# Patient Record
Sex: FEMALE | Race: WHITE | NOT HISPANIC OR LATINO | Employment: FULL TIME | URBAN - METROPOLITAN AREA
[De-identification: names, ages, dates, MRNs, and addresses within clinical notes are randomized per-mention and may not be internally consistent; named-entity substitution may affect disease eponyms.]

---

## 2017-01-09 ENCOUNTER — OFFICE VISIT (OUTPATIENT)
Dept: OPHTHALMOLOGY | Facility: CLINIC | Age: 52
End: 2017-01-09
Payer: COMMERCIAL

## 2017-01-09 DIAGNOSIS — Z13.5 GLAUCOMA SCREENING: ICD-10-CM

## 2017-01-09 DIAGNOSIS — Z01.00 VISIT FOR EYE AND VISION EXAM: Primary | ICD-10-CM

## 2017-01-09 DIAGNOSIS — H52.7 REFRACTIVE ERROR: ICD-10-CM

## 2017-01-09 PROCEDURE — 99999 PR PBB SHADOW E&M-NEW PATIENT-LVL II: CPT | Mod: PBBFAC,,, | Performed by: OPTOMETRIST

## 2017-01-09 PROCEDURE — 92015 DETERMINE REFRACTIVE STATE: CPT | Mod: S$GLB,,, | Performed by: OPTOMETRIST

## 2017-01-09 PROCEDURE — 92004 COMPRE OPH EXAM NEW PT 1/>: CPT | Mod: S$GLB,,, | Performed by: OPTOMETRIST

## 2017-01-09 NOTE — MR AVS SNAPSHOT
[facility] - Ophthalmology  [address] [facility] [place] [address]  Phone: [phone]  Fax: [phone]                  Linette Schmidt   2017 1:00 PM   Office Visit    Description:  Female : 1965   Provider:  SHAKILA Tong, OD   Department:  [facility] - Ophthalmology           Reason for Visit     Eye Exam           Diagnoses this Visit        Comments    Visit for eye and vision exam    -  Primary     Glaucoma screening         Refractive error                To Do List           Future Appointments        Provider Department Dept Phone    2018 1:00 PM SHAKILA Tong, OD [facility] Ophthalmology [phone]      Goals (5 Years of Data)     None      Follow-Up and Disposition     Return in about 1 year (around 2018).      [facility]     [facility] -  Assistance  Registered nurses in the [facility] provide clinical advisement, health education, appointment booking, and other advisory services.  Call for this free service at [phone].             Medications           Message regarding Medications     Verify the changes and/or additions to your medication regime listed below are the same as discussed with your clinician today.  If any of these changes or additions are incorrect, please notify your healthcare provider.             Verify that the below list of medications is an accurate representation of the medications you are currently taking.  If none reported, the list may be blank. If incorrect, please contact your healthcare provider. Carry this list with you in case of emergency.                Clinical Reference Information           Allergies as of 2017     No Known Allergies      Immunizations Administered on Date of Encounter - 2017     None      [facility] Sign-Up     Activating your [facility] account is as easy as 1-2-3!     1) Visit [email or web address], select Sign Up Now, enter this activation code and your date of birth, then  select Next.  [email or web address]  Expires: 2/23/2017  2:13 PM      2) Create a username and password to use when you visit [facility] in the future and select a security question in case you lose your password and select Next.    3) Enter your e-mail address and click Sign Up!    Additional Information  If you have questions, please e-mail [email or web address] or call [phone] to talk to our [facility] staff. Remember, [facility] is NOT to be used for urgent needs. For medical emergencies, dial 911.

## 2017-01-09 NOTE — PROGRESS NOTES
HPI     Last [facility] exam 04/21/2008         Last edited by Stoney Weller MA on 1/9/2017  1:17 PM.         Assessment /Plan     For exam results, see Encounter Report.    Visit for eye and vision exam    Glaucoma screening    Refractive error      OH OK OU.  Spec Rx given.  RTC one year.

## 2018-01-16 ENCOUNTER — OFFICE VISIT (OUTPATIENT)
Dept: OPHTHALMOLOGY | Facility: CLINIC | Age: 53
End: 2018-01-16
Payer: COMMERCIAL

## 2018-01-16 DIAGNOSIS — Z01.00 VISIT FOR EYE AND VISION EXAM: Primary | ICD-10-CM

## 2018-01-16 DIAGNOSIS — Z13.5 GLAUCOMA SCREENING: ICD-10-CM

## 2018-01-16 DIAGNOSIS — H52.7 REFRACTIVE ERROR: ICD-10-CM

## 2018-01-16 PROCEDURE — 92015 DETERMINE REFRACTIVE STATE: CPT | Mod: S$GLB,,, | Performed by: OPTOMETRIST

## 2018-01-16 PROCEDURE — 92014 COMPRE OPH EXAM EST PT 1/>: CPT | Mod: S$GLB,,, | Performed by: OPTOMETRIST

## 2018-01-16 PROCEDURE — 99999 PR PBB SHADOW E&M-EST. PATIENT-LVL I: CPT | Mod: PBBFAC,,, | Performed by: OPTOMETRIST

## 2018-01-16 RX ORDER — ALBUTEROL SULFATE 90 UG/1
AEROSOL, METERED RESPIRATORY (INHALATION)
COMMUNITY
Start: 2018-01-08 | End: 2023-10-12 | Stop reason: SDUPTHER

## 2018-01-16 RX ORDER — AZITHROMYCIN 250 MG/1
TABLET, FILM COATED ORAL
COMMUNITY
Start: 2018-01-08 | End: 2018-08-24

## 2018-01-16 NOTE — PROGRESS NOTES
HPI     Last [facility] exam 01/09/2017  Screening for glaucoma  RE      Last edited by Stoney Weller MA on 1/16/2018 12:50 PM. (History)            Assessment /Plan     For exam results, see Encounter Report.    Visit for eye and vision exam    Glaucoma screening    Refractive error      OH OK OU.  Spec Rx given.  RTC one year.

## 2018-07-11 DIAGNOSIS — Z00.00 ROUTINE GENERAL MEDICAL EXAMINATION AT A HEALTH CARE FACILITY: Primary | ICD-10-CM

## 2018-08-24 ENCOUNTER — CLINICAL SUPPORT (OUTPATIENT)
Dept: CARDIOLOGY | Facility: CLINIC | Age: 53
End: 2018-08-24
Payer: COMMERCIAL

## 2018-08-24 ENCOUNTER — OFFICE VISIT (OUTPATIENT)
Dept: INTERNAL MEDICINE | Facility: CLINIC | Age: 53
End: 2018-08-24
Payer: COMMERCIAL

## 2018-08-24 ENCOUNTER — HOSPITAL ENCOUNTER (OUTPATIENT)
Dept: RADIOLOGY | Facility: HOSPITAL | Age: 53
Discharge: HOME | End: 2018-08-24
Attending: FAMILY MEDICINE
Payer: COMMERCIAL

## 2018-08-24 ENCOUNTER — CLINICAL SUPPORT (OUTPATIENT)
Dept: INTERNAL MEDICINE | Facility: CLINIC | Age: 53
End: 2018-08-24
Payer: COMMERCIAL

## 2018-08-24 ENCOUNTER — CLINICAL SUPPORT (OUTPATIENT)
Dept: INTERNAL MEDICINE | Facility: CLINIC | Age: 53
End: 2018-08-24

## 2018-08-24 ENCOUNTER — CLINICAL SUPPORT (OUTPATIENT)
Dept: REHABILITATION | Facility: HOSPITAL | Age: 53
End: 2018-08-24
Attending: FAMILY MEDICINE
Payer: COMMERCIAL

## 2018-08-24 VITALS
WEIGHT: 206 LBS | HEART RATE: 80 BPM | BODY MASS INDEX: 32.33 KG/M2 | HEIGHT: 67 IN | DIASTOLIC BLOOD PRESSURE: 82 MMHG | TEMPERATURE: 96 F | RESPIRATION RATE: 14 BRPM | SYSTOLIC BLOOD PRESSURE: 128 MMHG

## 2018-08-24 VITALS
SYSTOLIC BLOOD PRESSURE: 128 MMHG | HEIGHT: 67 IN | DIASTOLIC BLOOD PRESSURE: 82 MMHG | HEART RATE: 80 BPM | WEIGHT: 206.38 LBS | BODY MASS INDEX: 32.39 KG/M2 | RESPIRATION RATE: 14 BRPM

## 2018-08-24 DIAGNOSIS — Z00.00 ROUTINE GENERAL MEDICAL EXAMINATION AT A HEALTH CARE FACILITY: ICD-10-CM

## 2018-08-24 DIAGNOSIS — J45.20 MILD INTERMITTENT ASTHMA WITHOUT COMPLICATION: ICD-10-CM

## 2018-08-24 DIAGNOSIS — Z23 NEED FOR DIPHTHERIA-TETANUS-PERTUSSIS (TDAP) VACCINE: ICD-10-CM

## 2018-08-24 DIAGNOSIS — Z00.00 ROUTINE GENERAL MEDICAL EXAMINATION AT A HEALTH CARE FACILITY: Primary | ICD-10-CM

## 2018-08-24 DIAGNOSIS — Z12.11 COLON CANCER SCREENING: ICD-10-CM

## 2018-08-24 DIAGNOSIS — Z12.39 BREAST SCREENING: ICD-10-CM

## 2018-08-24 DIAGNOSIS — E66.9 OBESITY (BMI 30.0-34.9): ICD-10-CM

## 2018-08-24 PROBLEM — E66.811 OBESITY (BMI 30.0-34.9): Status: ACTIVE | Noted: 2018-08-24

## 2018-08-24 LAB
ALBUMIN SERPL BCP-MCNC: 4.2 G/DL
ALP SERPL-CCNC: 85 U/L
ALT SERPL W/O P-5'-P-CCNC: 36 U/L
ANION GAP SERPL CALC-SCNC: 12 MMOL/L
AST SERPL-CCNC: 32 U/L
BILIRUB SERPL-MCNC: 1.2 MG/DL
BUN SERPL-MCNC: 22 MG/DL
CALCIUM SERPL-MCNC: 9.3 MG/DL
CHLORIDE SERPL-SCNC: 107 MMOL/L
CHOLEST SERPL-MCNC: 134 MG/DL
CHOLEST/HDLC SERPL: 3.4 {RATIO}
CO2 SERPL-SCNC: 25 MMOL/L
CREAT SERPL-MCNC: 0.8 MG/DL
DIASTOLIC DYSFUNCTION: NO
ERYTHROCYTE [DISTWIDTH] IN BLOOD BY AUTOMATED COUNT: 13.2 %
EST. GFR  (AFRICAN AMERICAN): >60 ML/MIN/1.73 M^2
EST. GFR  (NON AFRICAN AMERICAN): >60 ML/MIN/1.73 M^2
ESTIMATED AVG GLUCOSE: 91 MG/DL
GLUCOSE SERPL-MCNC: 92 MG/DL
HBA1C MFR BLD HPLC: 4.8 %
HCT VFR BLD AUTO: 40.5 %
HDLC SERPL-MCNC: 40 MG/DL
HDLC SERPL: 29.9 %
HGB BLD-MCNC: 13.8 G/DL
HIV 1+2 AB+HIV1 P24 AG SERPL QL IA: NEGATIVE
LDLC SERPL CALC-MCNC: 76.8 MG/DL
MCH RBC QN AUTO: 28.9 PG
MCHC RBC AUTO-ENTMCNC: 34.1 G/DL
MCV RBC AUTO: 85 FL
NONHDLC SERPL-MCNC: 94 MG/DL
PLATELET # BLD AUTO: 187 K/UL
PMV BLD AUTO: 12.8 FL
POTASSIUM SERPL-SCNC: 3.9 MMOL/L
PROT SERPL-MCNC: 6.7 G/DL
RBC # BLD AUTO: 4.77 M/UL
SODIUM SERPL-SCNC: 144 MMOL/L
TRIGL SERPL-MCNC: 86 MG/DL
TSH SERPL DL<=0.005 MIU/L-ACNC: 1.93 UIU/ML
WBC # BLD AUTO: 4.78 K/UL

## 2018-08-24 PROCEDURE — 97802 MEDICAL NUTRITION INDIV IN: CPT | Mod: S$GLB,,, | Performed by: INTERNAL MEDICINE

## 2018-08-24 PROCEDURE — 99386 PREV VISIT NEW AGE 40-64: CPT | Mod: 25,S$GLB,, | Performed by: FAMILY MEDICINE

## 2018-08-24 PROCEDURE — 80053 COMPREHEN METABOLIC PANEL: CPT | Mod: PO

## 2018-08-24 PROCEDURE — 83036 HEMOGLOBIN GLYCOSYLATED A1C: CPT

## 2018-08-24 PROCEDURE — 84443 ASSAY THYROID STIM HORMONE: CPT

## 2018-08-24 PROCEDURE — 71046 X-RAY EXAM CHEST 2 VIEWS: CPT | Mod: TC,FY,PO

## 2018-08-24 PROCEDURE — 85027 COMPLETE CBC AUTOMATED: CPT | Mod: PO

## 2018-08-24 PROCEDURE — 71046 X-RAY EXAM CHEST 2 VIEWS: CPT | Mod: 26,,, | Performed by: RADIOLOGY

## 2018-08-24 PROCEDURE — 90471 IMMUNIZATION ADMIN: CPT | Mod: S$GLB,,, | Performed by: FAMILY MEDICINE

## 2018-08-24 PROCEDURE — 93015 CV STRESS TEST SUPVJ I&R: CPT | Mod: S$GLB,,, | Performed by: INTERNAL MEDICINE

## 2018-08-24 PROCEDURE — 97750 PHYSICAL PERFORMANCE TEST: CPT | Mod: PO | Performed by: PHYSICAL THERAPIST

## 2018-08-24 PROCEDURE — 90715 TDAP VACCINE 7 YRS/> IM: CPT | Mod: S$GLB,,, | Performed by: FAMILY MEDICINE

## 2018-08-24 PROCEDURE — 99999 PR PBB SHADOW E&M-EST. PATIENT-LVL III: CPT | Mod: PBBFAC,,, | Performed by: FAMILY MEDICINE

## 2018-08-24 PROCEDURE — 93000 ELECTROCARDIOGRAM COMPLETE: CPT | Mod: 59,S$GLB,, | Performed by: INTERNAL MEDICINE

## 2018-08-24 PROCEDURE — 80061 LIPID PANEL: CPT | Mod: PO

## 2018-08-24 PROCEDURE — 86703 HIV-1/HIV-2 1 RESULT ANTBDY: CPT

## 2018-08-24 RX ORDER — SODIUM, POTASSIUM,MAG SULFATES 17.5-3.13G
SOLUTION, RECONSTITUTED, ORAL ORAL
Qty: 1 BOTTLE | Refills: 0 | Status: SHIPPED | OUTPATIENT
Start: 2018-08-24 | End: 2021-07-22

## 2018-08-24 RX ORDER — METHOCARBAMOL 750 MG/1
TABLET, FILM COATED ORAL
Refills: 0 | COMMUNITY
Start: 2018-07-17 | End: 2018-08-24

## 2018-08-24 RX ORDER — DICLOFENAC SODIUM 50 MG/1
50 TABLET, DELAYED RELEASE ORAL EVERY 8 HOURS PRN
Refills: 0 | COMMUNITY
Start: 2018-07-17 | End: 2018-08-24

## 2018-08-24 NOTE — PROGRESS NOTES
"Subjective:       Patient ID: Linette Schmidt is a 53 y.o. female.    Chief Complaint: Executive Health    53-year-old female patient of Dr. King Cottrell here for EZBOB physical for [place] for the 1st time.  Patient with Patient Active Problem List:     Obesity (BMI 30.0-34.9)  Reports that she has been feeling better in denies any significant complaints today.  Takes albuterol inhaler as needed for history of asthma.  Patient reported that she has joined weight loss clinic 5 weeks ago and has lost 20 lb.  Stays physically active with walking at work but does not get any routine exercise  Denies any changes in bowel movements, appetite has been stable.       Review of Systems   Constitutional: Negative for fatigue.   Eyes: Negative for visual disturbance.   Respiratory: Negative for shortness of breath and wheezing.    Cardiovascular: Negative for chest pain and leg swelling.   Gastrointestinal: Negative for abdominal pain, nausea and vomiting.   Musculoskeletal: Negative for myalgias.   Skin: Negative for rash.   Neurological: Negative for light-headedness and headaches.   Psychiatric/Behavioral: Negative for sleep disturbance.         /82   Pulse 80   Temp 96.1 °F (35.6 °C) (Tympanic)   Resp 14   Ht 5' 7" (1.702 m)   Wt 93.4 kg (206 lb)   BMI 32.26 kg/m²   Objective:      Physical Exam   Constitutional: She is oriented to person, place, and time. She appears well-developed and well-nourished.   HENT:   Head: Normocephalic and atraumatic.   Mouth/Throat: Oropharynx is clear and moist.   Cardiovascular: Normal rate, regular rhythm and normal heart sounds.   No murmur heard.  Pulmonary/Chest: Effort normal and breath sounds normal. She has no wheezes.   Abdominal: Soft. Bowel sounds are normal. There is no tenderness.   Musculoskeletal: She exhibits no edema.   Neurological: She is alert and oriented to person, place, and time.   Skin: Skin is warm and dry. No rash noted.   Psychiatric: " She has a normal mood and affect.       Clinical Support on 08/24/2018   Component Date Value Ref Range Status    Sodium 08/24/2018 144  136 - 145 mmol/L Final    Potassium 08/24/2018 3.9  3.5 - 5.1 mmol/L Final    Chloride 08/24/2018 107  95 - 110 mmol/L Final    CO2 08/24/2018 25  23 - 29 mmol/L Final    Glucose 08/24/2018 92  70 - 110 mg/dL Final    BUN, Bld 08/24/2018 22* 6 - 20 mg/dL Final    Creatinine 08/24/2018 0.8  0.5 - 1.4 mg/dL Final    Calcium 08/24/2018 9.3  8.7 - 10.5 mg/dL Final    Total Protein 08/24/2018 6.7  6.0 - 8.4 g/dL Final    Albumin 08/24/2018 4.2  3.5 - 5.2 g/dL Final    Total Bilirubin 08/24/2018 1.2* 0.1 - 1.0 mg/dL Final    Comment: For infants and newborns, interpretation of results should be based  on gestational age, weight and in agreement with clinical  observations.  Premature Infant recommended reference ranges:  Up to 24 hours.............<8.0 mg/dL  Up to 48 hours............<12.0 mg/dL  3-5 days..................<15.0 mg/dL  6-29 days.................<15.0 mg/dL      Alkaline Phosphatase 08/24/2018 85  55 - 135 U/L Final    AST 08/24/2018 32  10 - 40 U/L Final    ALT 08/24/2018 36  10 - 44 U/L Final    Anion Gap 08/24/2018 12  8 - 16 mmol/L Final    eGFR if  08/24/2018 >60  >60 mL/min/1.73 m^2 Final    eGFR if non African American 08/24/2018 >60  >60 mL/min/1.73 m^2 Final    Comment: Calculation used to obtain the estimated glomerular filtration  rate (eGFR) is the CKD-EPI equation.       WBC 08/24/2018 4.78  3.90 - 12.70 K/uL Final    RBC 08/24/2018 4.77  4.00 - 5.40 M/uL Final    Hemoglobin 08/24/2018 13.8  12.0 - 16.0 g/dL Final    Hematocrit 08/24/2018 40.5  37.0 - 48.5 % Final    MCV 08/24/2018 85  82 - 98 fL Final    MCH 08/24/2018 28.9  27.0 - 31.0 pg Final    MCHC 08/24/2018 34.1  32.0 - 36.0 g/dL Final    RDW 08/24/2018 13.2  11.5 - 14.5 % Final    Platelets 08/24/2018 187  150 - 350 K/uL Final    MPV 08/24/2018 12.8  9.2 -  12.9 fL Final    Cholesterol 08/24/2018 134  120 - 199 mg/dL Final    Comment: The National Cholesterol Education Program (NCEP) has set the  following guidelines (reference ranges) for Cholesterol:  Optimal.....................<200 mg/dL  Borderline High.............200-239 mg/dL  High........................> or = 240 mg/dL      Triglycerides 08/24/2018 86  30 - 150 mg/dL Final    Comment: The National Cholesterol Education Program (NCEP) has set the  following guidelines (reference values) for triglycerides:  Normal......................<150 mg/dL  Borderline High.............150-199 mg/dL  High........................200-499 mg/dL      HDL 08/24/2018 40  40 - 75 mg/dL Final    Comment: The National Cholesterol Education Program (NCEP) has set the  following guidelines (reference values) for HDL Cholesterol:  Low...............<40 mg/dL  Optimal...........>60 mg/dL      LDL Cholesterol 08/24/2018 76.8  63.0 - 159.0 mg/dL Final    Comment: The National Cholesterol Education Program (NCEP) has set the  following guidelines (reference values) for LDL Cholesterol:  Optimal.......................<130 mg/dL  Borderline High...............130-159 mg/dL  High..........................160-189 mg/dL  Very High.....................>190 mg/dL      HDL/Chol Ratio 08/24/2018 29.9  20.0 - 50.0 % Final    Total Cholesterol/HDL Ratio 08/24/2018 3.4  2.0 - 5.0 Final    Non-HDL Cholesterol 08/24/2018 94  mg/dL Final    Comment: Risk category and Non-HDL cholesterol goals:  Coronary heart disease (CHD)or equivalent (10-year risk of CHD >20%):  Non-HDL cholesterol goal     <130 mg/dL  Two or more CHD risk factors and 10-year risk of CHD <= 20%:  Non-HDL cholesterol goal     <160 mg/dL  0 to 1 CHD risk factor:  Non-HDL cholesterol goal     <190 mg/dL         Assessment:       1. Routine general medical examination at a health care facility    2. Obesity (BMI 30.0-34.9)    3. Colon cancer screening    4. Breast screening    5.  Need for diphtheria-tetanus-pertussis (Tdap) vaccine    6. Mild intermittent asthma without complication        Plan:   Routine general medical examination at a health care facility  Obesity (BMI 30.0-34.9)  Vital signs stable today.  Clinical exam stable.  Encouraged to continue lifestyle modifications with diet and exercise to work on losing weight  Patient doing really well with weight loss clinic  Labs look stable including EKG showing normal sinus rhythm    Colon cancer screening  -     Case request GI: COLONOSCOPY  -     sodium,potassium,mag sulfates (SUPREP BOWEL PREP KIT) 17.5-3.13-1.6 gram SolR; Take it as directed  Dispense: 1 Bottle; Refill: 0    Patient due for colonoscopy and would like to get scheduled  Breast screening  -     Mammo Digital Screening Bilat with CAD; Future; Expected date: 08/24/2018  Patient due for mammogram  Patient status post partial hysterectomy    Need for diphtheria-tetanus-pertussis (Tdap) vaccine  -     (In Office Administered) Tdap Vaccine  Tetanus booster given today    Mild intermittent asthma without complication-stable on albuterol inhaler as needed

## 2018-08-24 NOTE — PROGRESS NOTES
Nutrition Assessment  This is a general nutrition consultation as per the contractual agreement of he client employer's insurance provider.    Client name:  Linette Schmidt  :  1965  Age:  53 y.o.  Gender:  female    FMH: Mother, Father and twin sister - HTN    Client states: she was informed today during her treadmill test that she has HTN and mentions has a strong family history of the same. One of her co-workers was on Medifast (Optavia), and since she (client) was unhappy, not feeling well and feet had been hurting, spontaneously decided that she would try the program. Currently she is in stage 1, consuming 800 - 1000 calories daily, 2000 mg sodium and all meals/snacks are OPTAVIA foods except dinner meal is prepared. 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 Medifast has improved her behaviors as she no longer drinks diet coke and previously had 3 cans daily, has since replaced with water. Now feels more well hydrated, and skin has improved. Her indulgence prior to Medifast was carbohydrates particularly large quantities of pasta as she is not a meat eater. Dinner meals prior to Medifast were frozen chicken patties or similar products pre cooked and frozen. Due to the need of homemade meal preparation she is watching less TV. Exercise is not permissible at this time, but recently purchased and wearing an Apple watch and looks forward to using it. Her long term wt. Loss goal is 160# and her plan is to transition to Wt. Watcher's program to learn how to choose and cook healthy foods. Today she is interested in healthy frozen foods that are reasonable in sodium so that less time is taken in meal prep and can enjoy more social events.    Past Medical History:   Diagnosis Date    Asthma        Social History    Marital status:    Employment:   works for [employer] , she works REG  "-[employer]  Social History     Tobacco Use    Smoking status: Never Smoker    Smokeless tobacco: Never Used   Substance Use Topics    Alcohol use: Yes     Comment: socially        Current medications:  has a current medication list which includes the following prescription(s): proair hfa and sodium,potassium,mag sulfates.  Vitamins, minerals, and/or supplements:  Calcium/Vitamin D, Vitamin C, B-complex   Food allergies or intolerances:  none     Food History  Breakfast:  Optiva mashed potatoes  Mid-morning Snack:  bar  Lunch:  Chicken noodle soup  Mid-afternoon Snack:  Optiva cookie or brownie  Snack: cereal or shake  Dinner:  5 oz. Chicken 3 1/2 c. non starchy veggies    Exercise History:  None, Restricted while in this phase of [place]     Lab Reports (unavailable at time of visit)  Total Cholesterol:  134    Triglycerides:  86  HDL:  40  LDL:  76.8   Glucose:  92  HbA1c:  pending  BP:  128/82     Weight History  Height:  5'7"     Weight:  206  BMI:  32.26  % Body Fat:  Declined fitness evaluation    Diagnosis  RMR (Method:  [address] [employer]):  1728   Activity Factor:  1.3  NICOLAS:  1915 - 500 = 1415    Obesity related to improper food choices and imbalanced meals as evidenced by BMI: 32.26.    Intervention    Goals:  1.  Continue with [place] til wt goal of 160#  2.  Transition to Wt. Watcher membership    3.  Aerobic exercise 3x/wk - 2.5+ hrs.week  4. 2000 - 2500 mg sodium daily goal    Laboratory values unavailable at time of visit. Complimented client on success of wt loss. Discussed guidelines for low and high sodium foods per serving and how to read label.Explained meal prep best done with fresh foods as nature provides, or frozen vegetables without salt or frozen meals such as Healthy choice simply bowls. Provided Healthy shopping list and which items suggested for fast meals. Explained the advantages of bean pasta and gave brand name and client very interested in a trial. Reviewed the resources of Fast " Food and Lite Restaurant dining guides and Eat Fit mobile dining [place].Client very appreciated of this info and stated it would be very useful. Understanding appears good and appears in action phase of change.     Handouts provided:  Meal Planning Guide  Restaurant Guide  Eat Fit Shopping List  Eat Fit Genesis  Fast Food Guide  Vitamin/Mineral Guide    Monitoring/Evaluation    Monitor the following:  Weight  BMI  % Body Fat  Caloric intake  Labs:  CMP/Lipids    Follow Up Plan:  Follow up with client in 1-2 years

## 2018-08-24 NOTE — PROGRESS NOTES
:  [birth year]    DX: v70.0  Orders:  Fitness Evaluation    An Executive Health Fitness Component evaluation was completed.  Results are as follows:    Height (in):    67                            Weight (lbs):    206                                    BMI:   32.3    Resting Energy Expenditure:         1590       kcal/24 hrs.              Estimated:    1587     REE measured post treadmill:         No   REE measured fasting:       Yes           Body Composition:          36.45  % body fat             Rating: Fair    Waist to Hip Ratio:     0.84                    Risk:  Moderate   Hip taken over clothing:      Yes          Muscular Strength and Endurance Assessment:               Strength (lbs):     Right: 61             Rating:  Average      Left:  59           Rating: Average   Push-ups:   14                 Rating:  Very good   Curl-ups :   48                Rating:  Well above average    Flexibility Testing:   Sit and Reach (cm):    25                       Rating:  Fair    The patient tolerated the testing procedures well.

## 2018-08-31 ENCOUNTER — DOCUMENTATION ONLY (OUTPATIENT)
Dept: ENDOSCOPY | Facility: HOSPITAL | Age: 53
End: 2018-08-31

## 2018-09-04 ENCOUNTER — HOSPITAL ENCOUNTER (OUTPATIENT)
Dept: RADIOLOGY | Facility: HOSPITAL | Age: 53
Discharge: HOME | End: 2018-09-04
Attending: FAMILY MEDICINE
Payer: COMMERCIAL

## 2018-09-04 VITALS — BODY MASS INDEX: 32.33 KG/M2 | WEIGHT: 206 LBS | HEIGHT: 67 IN

## 2018-09-04 DIAGNOSIS — Z12.39 BREAST SCREENING: ICD-10-CM

## 2018-09-04 PROCEDURE — 77067 SCR MAMMO BI INCL CAD: CPT | Mod: 26,,, | Performed by: RADIOLOGY

## 2018-09-04 PROCEDURE — 77067 SCR MAMMO BI INCL CAD: CPT | Mod: TC,PO

## 2018-09-04 PROCEDURE — 77063 BREAST TOMOSYNTHESIS BI: CPT | Mod: 26,,, | Performed by: RADIOLOGY

## 2019-05-31 DIAGNOSIS — Z00.00 ROUTINE GENERAL MEDICAL EXAMINATION AT A HEALTH CARE FACILITY: Primary | ICD-10-CM

## 2019-05-31 DIAGNOSIS — Z91.89 AT RISK FOR CORONARY ARTERY DISEASE: ICD-10-CM

## 2019-06-17 ENCOUNTER — APPOINTMENT (OUTPATIENT)
Dept: RADIOLOGY | Facility: HOSPITAL | Age: 54
End: 2019-06-17
Payer: COMMERCIAL

## 2019-06-17 ENCOUNTER — HOSPITAL ENCOUNTER (OUTPATIENT)
Dept: RADIOLOGY | Facility: HOSPITAL | Age: 54
Discharge: HOME | End: 2019-06-17
Attending: FAMILY MEDICINE
Payer: COMMERCIAL

## 2019-06-17 ENCOUNTER — CLINICAL SUPPORT (OUTPATIENT)
Dept: INTERNAL MEDICINE | Facility: CLINIC | Age: 54
End: 2019-06-17
Payer: COMMERCIAL

## 2019-06-17 ENCOUNTER — OFFICE VISIT (OUTPATIENT)
Dept: INTERNAL MEDICINE | Facility: CLINIC | Age: 54
End: 2019-06-17
Payer: COMMERCIAL

## 2019-06-17 ENCOUNTER — CLINICAL SUPPORT (OUTPATIENT)
Dept: REHABILITATION | Facility: HOSPITAL | Age: 54
End: 2019-06-17
Payer: COMMERCIAL

## 2019-06-17 VITALS
HEIGHT: 66 IN | BODY MASS INDEX: 23.74 KG/M2 | RESPIRATION RATE: 16 BRPM | DIASTOLIC BLOOD PRESSURE: 82 MMHG | HEART RATE: 67 BPM | WEIGHT: 147.69 LBS | SYSTOLIC BLOOD PRESSURE: 150 MMHG

## 2019-06-17 VITALS
HEIGHT: 66 IN | BODY MASS INDEX: 23.74 KG/M2 | HEART RATE: 67 BPM | OXYGEN SATURATION: 99 % | DIASTOLIC BLOOD PRESSURE: 82 MMHG | TEMPERATURE: 97 F | SYSTOLIC BLOOD PRESSURE: 150 MMHG | RESPIRATION RATE: 16 BRPM | WEIGHT: 147.69 LBS

## 2019-06-17 DIAGNOSIS — E78.2 MIXED HYPERLIPIDEMIA: ICD-10-CM

## 2019-06-17 DIAGNOSIS — Z00.00 ROUTINE GENERAL MEDICAL EXAMINATION AT A HEALTH CARE FACILITY: Primary | ICD-10-CM

## 2019-06-17 DIAGNOSIS — J45.20 MILD INTERMITTENT ASTHMA WITHOUT COMPLICATION: ICD-10-CM

## 2019-06-17 DIAGNOSIS — Z00.00 ROUTINE GENERAL MEDICAL EXAMINATION AT A HEALTH CARE FACILITY: ICD-10-CM

## 2019-06-17 DIAGNOSIS — Z71.3 DIETARY COUNSELING: ICD-10-CM

## 2019-06-17 DIAGNOSIS — Z91.89 AT RISK FOR CORONARY ARTERY DISEASE: ICD-10-CM

## 2019-06-17 DIAGNOSIS — M81.0 OSTEOPOROSIS, UNSPECIFIED OSTEOPOROSIS TYPE, UNSPECIFIED PATHOLOGICAL FRACTURE PRESENCE: ICD-10-CM

## 2019-06-17 DIAGNOSIS — I10 ESSENTIAL HYPERTENSION: ICD-10-CM

## 2019-06-17 PROBLEM — E66.9 OBESITY (BMI 30.0-34.9): Status: RESOLVED | Noted: 2018-08-24 | Resolved: 2019-06-17

## 2019-06-17 PROBLEM — E66.811 OBESITY (BMI 30.0-34.9): Status: RESOLVED | Noted: 2018-08-24 | Resolved: 2019-06-17

## 2019-06-17 LAB
ALBUMIN SERPL BCP-MCNC: 4 G/DL (ref 3.5–5.2)
ALP SERPL-CCNC: 83 U/L (ref 55–135)
ALT SERPL W/O P-5'-P-CCNC: 24 U/L (ref 10–44)
ANION GAP SERPL CALC-SCNC: 11 MMOL/L (ref 8–16)
AST SERPL-CCNC: 24 U/L (ref 10–40)
BILIRUB SERPL-MCNC: 1 MG/DL (ref 0.1–1)
BUN SERPL-MCNC: 28 MG/DL (ref 6–20)
CALCIUM SERPL-MCNC: 9.5 MG/DL (ref 8.7–10.5)
CHLORIDE SERPL-SCNC: 106 MMOL/L (ref 95–110)
CHOLEST SERPL-MCNC: 133 MG/DL (ref 120–199)
CHOLEST/HDLC SERPL: 2.3 {RATIO} (ref 2–5)
CO2 SERPL-SCNC: 27 MMOL/L (ref 23–29)
CREAT SERPL-MCNC: 0.8 MG/DL (ref 0.5–1.4)
ERYTHROCYTE [DISTWIDTH] IN BLOOD BY AUTOMATED COUNT: 12.1 % (ref 11.5–14.5)
EST. GFR  (AFRICAN AMERICAN): >60 ML/MIN/1.73 M^2
EST. GFR  (NON AFRICAN AMERICAN): >60 ML/MIN/1.73 M^2
ESTIMATED AVG GLUCOSE: 91 MG/DL (ref 68–131)
GLUCOSE SERPL-MCNC: 87 MG/DL (ref 70–110)
HBA1C MFR BLD HPLC: 4.8 % (ref 4–5.6)
HCT VFR BLD AUTO: 38.2 % (ref 37–48.5)
HDLC SERPL-MCNC: 58 MG/DL (ref 40–75)
HDLC SERPL: 43.6 % (ref 20–50)
HGB BLD-MCNC: 13.4 G/DL (ref 12–16)
LDLC SERPL CALC-MCNC: 61.6 MG/DL (ref 63–159)
MCH RBC QN AUTO: 30.7 PG (ref 27–31)
MCHC RBC AUTO-ENTMCNC: 35.1 G/DL (ref 32–36)
MCV RBC AUTO: 88 FL (ref 82–98)
NONHDLC SERPL-MCNC: 75 MG/DL
PLATELET # BLD AUTO: 171 K/UL (ref 150–350)
PMV BLD AUTO: 12 FL (ref 9.2–12.9)
POTASSIUM SERPL-SCNC: 4.6 MMOL/L (ref 3.5–5.1)
PROT SERPL-MCNC: 6.8 G/DL (ref 6–8.4)
RBC # BLD AUTO: 4.36 M/UL (ref 4–5.4)
SODIUM SERPL-SCNC: 144 MMOL/L (ref 136–145)
TRIGL SERPL-MCNC: 67 MG/DL (ref 30–150)
TSH SERPL DL<=0.005 MIU/L-ACNC: 2.25 UIU/ML (ref 0.4–4)
WBC # BLD AUTO: 5.69 K/UL (ref 3.9–12.7)

## 2019-06-17 PROCEDURE — 75571 CT HRT W/O DYE W/CA TEST: CPT | Mod: 26,,, | Performed by: RADIOLOGY

## 2019-06-17 PROCEDURE — 3077F SYST BP >= 140 MM HG: CPT | Mod: CPTII,S$GLB,, | Performed by: FAMILY MEDICINE

## 2019-06-17 PROCEDURE — 75571 CT HRT W/O DYE W/CA TEST: CPT | Mod: TC

## 2019-06-17 PROCEDURE — 99999 PR PBB SHADOW E&M-EST. PATIENT-LVL III: CPT | Mod: PBBFAC,,, | Performed by: FAMILY MEDICINE

## 2019-06-17 PROCEDURE — 97750 PHYSICAL PERFORMANCE TEST: CPT | Performed by: PHYSICAL THERAPIST

## 2019-06-17 PROCEDURE — 97802 MEDICAL NUTRITION INDIV IN: CPT | Mod: S$GLB,,, | Performed by: INTERNAL MEDICINE

## 2019-06-17 PROCEDURE — 3079F PR MOST RECENT DIASTOLIC BLOOD PRESSURE 80-89 MM HG: ICD-10-PCS | Mod: CPTII,S$GLB,, | Performed by: FAMILY MEDICINE

## 2019-06-17 PROCEDURE — 83036 HEMOGLOBIN GLYCOSYLATED A1C: CPT

## 2019-06-17 PROCEDURE — 77080 DEXA BONE DENSITY SPINE HIP: ICD-10-PCS | Mod: 26,,, | Performed by: RADIOLOGY

## 2019-06-17 PROCEDURE — 99999 PR PBB SHADOW E&M-EST. PATIENT-LVL III: ICD-10-PCS | Mod: PBBFAC,,, | Performed by: FAMILY MEDICINE

## 2019-06-17 PROCEDURE — 3079F DIAST BP 80-89 MM HG: CPT | Mod: CPTII,S$GLB,, | Performed by: FAMILY MEDICINE

## 2019-06-17 PROCEDURE — 84443 ASSAY THYROID STIM HORMONE: CPT

## 2019-06-17 PROCEDURE — 3077F PR MOST RECENT SYSTOLIC BLOOD PRESSURE >= 140 MM HG: ICD-10-PCS | Mod: CPTII,S$GLB,, | Performed by: FAMILY MEDICINE

## 2019-06-17 PROCEDURE — 75571 CT CALCIUM SCORING CARDIAC: ICD-10-PCS | Mod: 26,,, | Performed by: RADIOLOGY

## 2019-06-17 PROCEDURE — 97802 PR MED NUTR THER, 1ST, INDIV, EA 15 MIN: ICD-10-PCS | Mod: S$GLB,,, | Performed by: INTERNAL MEDICINE

## 2019-06-17 PROCEDURE — 77080 DXA BONE DENSITY AXIAL: CPT | Mod: TC

## 2019-06-17 PROCEDURE — 77080 DXA BONE DENSITY AXIAL: CPT | Mod: 26,,, | Performed by: RADIOLOGY

## 2019-06-17 PROCEDURE — 80061 LIPID PANEL: CPT

## 2019-06-17 PROCEDURE — 80053 COMPREHEN METABOLIC PANEL: CPT

## 2019-06-17 PROCEDURE — 85027 COMPLETE CBC AUTOMATED: CPT

## 2019-06-17 PROCEDURE — 99386 PR PREVENTIVE VISIT,NEW,40-64: ICD-10-PCS | Mod: S$GLB,,, | Performed by: FAMILY MEDICINE

## 2019-06-17 PROCEDURE — 99386 PREV VISIT NEW AGE 40-64: CPT | Mod: S$GLB,,, | Performed by: FAMILY MEDICINE

## 2019-06-17 RX ORDER — PRAVASTATIN SODIUM 40 MG/1
TABLET ORAL
Refills: 0 | COMMUNITY
Start: 2019-05-23 | End: 2020-06-22

## 2019-06-17 RX ORDER — RAMIPRIL 10 MG/1
CAPSULE ORAL
Refills: 0 | COMMUNITY
Start: 2019-05-23

## 2019-06-17 RX ORDER — ASPIRIN 81 MG/1
81 TABLET ORAL DAILY
COMMUNITY

## 2019-06-17 NOTE — PROGRESS NOTES
"Nutrition Assessment  Client name:  Linette Schmidt  :  1965  Age:  54 y.o.  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 Patient-reported history of hypertension and dyslipidemia, both for which she takes medications. She reports a 85 lb weight loss in 7 months on the Optavia program. She has been maintaining the loss for approximately 4 months and is interested in transitioning away from the Optavia products for less expensive and whole food options. Current exercise includes walking and taking the stairs when possible.     Anthropometrics  Height:  5'6"     Weight:  147 lb  BMI:  23.8  % Body Fat:  27.13%    Clinical Signs/Symptoms  N/V/D:  None  Appetite (Good, Fair, or Poor):  Good      Past Medical History:   Diagnosis Date    Asthma        Past Surgical History:   Procedure Laterality Date    FINGER FRACTURE SURGERY Left     5th finger    PARTIAL HYSTERECTOMY         Medications    has a current medication list which includes the following prescription(s): aspirin, pravastatin, proair hfa, ramipril, and sodium,potassium,mag sulfates.    Vitamins, Minerals, and/or Supplements:  MVI, Vit C, B-complex, calcium plus vit D     Food/Medication Interactions:  Reviewed     Food Allergies or Intolerances:  NKFA    Social History    Marital status:    Employment:      Social History     Tobacco Use    Smoking status: Never Smoker    Smokeless tobacco: Never Used   Substance Use Topics    Alcohol use: Yes     Comment: socially        Lab Reports   Total Cholesterol:  133  Triglycerides:  67  HDL:  58  LDL:  61.6   Glucose:  87  HbA1c:  pending  BP:  150/82     Food History  Breakfast:  Egg white and Optavia bar  Mid-morning Snack:  Fruit or [place] granola bar  Lunch:  Caulifower, low calorie dressing, Optavia soup  Mid-afternoon Snack:  Fruit or granola bar  Dinner:  Chicken with vegetables or salad  H.S. Snack:  " Optavia bar  *Fluid intake:  water    Exercise History:  Walking and taking the stairs when possible    Cultural/Spiritual/Personal Preferences:  None Identified    Support System:  Spouse    State of Change:  Preparation    Barriers to Change:  Time    Diagnosis    Food and nutrition-related knowledge defecit related to limited nutrition education as evidenced by patient's inquiries regarding energy balance, meal/snack planning and sodium.    Intervention    RMR (Method:  [employer]):  1298 kcal  Activity Factor:  1.3  NICOLAS:  1687    Goals:  1.  Maintain weight loss  2.  Transition from Optavia snacks to less expensive protein bars and/or whole food options  3.  Limit sodium by reading food labels and limiting dining out.    Nutrition Education  Lab results were not available at time of consult, therefore were not discussed. Complimented patient on her weight loss and her ability to maintain the loss thus far. Discussed energy balance in weight management and strategies to transition away from optavia products to less expensive, whole-food options to add variety and quality to the diet. Discussed portion control and meal planning using The Plate Method template as well as building a balance snack to improve satiety. Reviewed and provided Fast Food and Restaurant guides to assist with making healthier options when dining out. Discussed reading food labels and assessing sodium content in packaged foods as well as general diet guidelines on limiting sodium intake. Reviewed benefits of exercise and Physical Activity guidelines encouraging a slow increase to 150 minutes of exercise weekly. Encouraged small, incremental habit change to improve adherence and sustainability over time.      Patient verbalized understanding of nutrition education and recommendations received.    Handouts Provided  Meal Planning Guide  Restaurant Guide  Eat Fit Shopping List  Eat Fit Genesis  Fast Food Guide  The Plate Method  Satisfying  Salads    Monitoring/Evaluation    Monitor the following:  Weight  BMI  % Body Fat  Caloric intake  Labs:  CMP, Lipid panel, Hgb A1c    Follow Up Plan:  Communication with referring healthcare provider is unnecessary at this time as patient presented as part of annual wellness exam.  However, will follow up with patient in 1-2 years.

## 2019-06-17 NOTE — PROGRESS NOTES
"Subjective:       Patient ID: Linette Schmidt is a 54 y.o. female.    Chief Complaint: Executive Health    54-year-old female patient with Patient Active Problem List:     Mild intermittent asthma without complication     Mixed hyperlipidemia     Osteoporosis     Essential hypertension  Here for executive health physical for [place].  Patient reports that she has been taking her medications regularly, and has been placed on ramipril 10 mg for her heart.   Patient denies any headache or vision disturbances, chest pain shortness of breath abdominal discomfort nausea vomiting.  Never had DEXA scan in the past.  Asthma has been stable.  Has lost 85 lb in the past 1 year and reports that she has been having hair loss.   Will start exercise soon, has lost weight with weight loss program    Review of Systems   Constitutional: Negative for fatigue.   Eyes: Negative for visual disturbance.   Respiratory: Negative for shortness of breath.    Cardiovascular: Negative for chest pain and leg swelling.   Gastrointestinal: Negative for abdominal pain, nausea and vomiting.   Musculoskeletal: Negative for myalgias.   Skin: Negative for rash.   Neurological: Negative for light-headedness and headaches.   Psychiatric/Behavioral: Negative for sleep disturbance.         BP (!) 150/82 (BP Location: Left arm, Patient Position: Sitting, BP Method: Medium (Manual))   Pulse 67   Temp 97 °F (36.1 °C) (Tympanic)   Resp 16   Ht 5' 6" (1.676 m)   Wt 67 kg (147 lb 11.3 oz)   SpO2 99%   BMI 23.84 kg/m²   Objective:      Physical Exam   Constitutional: She is oriented to person, place, and time. She appears well-developed and well-nourished.   HENT:   Head: Normocephalic and atraumatic.   Mouth/Throat: Oropharynx is clear and moist.   Cardiovascular: Normal rate, regular rhythm and normal heart sounds.   No murmur heard.  Pulmonary/Chest: Effort normal and breath sounds normal. She has no wheezes.   Abdominal: Soft. Bowel sounds are " normal. There is no tenderness.   Musculoskeletal: She exhibits no edema.   Neurological: She is alert and oriented to person, place, and time.   Skin: Skin is warm and dry. No rash noted.   Psychiatric: She has a normal mood and affect.         Assessment/Plan:   1. Routine general medical examination at a health care facility  Vital signs stable today.  Clinical exam stable.  Up-to-date with screenings and vaccinations  Continue lifestyle changes recommended with diet and exercise    2. Mild intermittent asthma without complication  Stable on albuterol inhaler as needed    3. Mixed hyperlipidemia  Stable on pravastatin 40 mg daily    4. Osteoporosis, unspecified osteoporosis type, unspecified pathological fracture presence  Patient was advised to start taking calcium with vitamin-D supplements over-the-counter and establish care with Rheumatology to discuss further treatment options are discuss further with PCP for DEXA scan showing osteoporosis    5. Essential hypertension  Blood pressure mildly elevated currently taking ramipril 10 mg daily, advised to discuss further with PCP  Encouraged to monitor blood pressure trends  Patient not interested in hypertension digital program at this time

## 2019-06-17 NOTE — PROGRESS NOTES
:  69    DX: Z00.0  Orders:  Fitness Evaluation    Patient's [facility] Fitness Component evaluation was completed.  Results are as follows:    Height (in):    66                            Weight (lbs):    147                                    BMI:   23.8    Resting Energy Expenditure:         1440       kcal/24 hrs.              Estimated:    1298     REE measured post treadmill:         No   REE measured fasting:       Yes          Body Composition:          27.13  % body fat             Rating: very good    Waist to Hip Ratio:     0.80                    Risk:  low   Hip taken over clothing:      Yes          Muscular Strength and Endurance Assessment:               Strength (lbs):     Right: 64             Rating:  average      Left:  55.7           Rating: average   Push-ups:   13                 Rating:  Very good   Curl-ups :   48                Rating:  Well above average    Flexibility Testing:   Sit and Reach (cm):    30                       Rating:  Very good    Patient has lost ~ 60# since last visit with primarily dietary changes. Feeling much better.  Has also started exercising.    The patient completed the testing procedures without complications.

## 2020-05-21 DIAGNOSIS — Z00.00 ROUTINE GENERAL MEDICAL EXAMINATION AT A HEALTH CARE FACILITY: Primary | ICD-10-CM

## 2020-06-22 ENCOUNTER — CLINICAL SUPPORT (OUTPATIENT)
Dept: INTERNAL MEDICINE | Facility: CLINIC | Age: 55
End: 2020-06-22
Payer: COMMERCIAL

## 2020-06-22 ENCOUNTER — CLINICAL SUPPORT (OUTPATIENT)
Dept: REHABILITATION | Facility: HOSPITAL | Age: 55
End: 2020-06-22
Attending: FAMILY MEDICINE
Payer: COMMERCIAL

## 2020-06-22 ENCOUNTER — OFFICE VISIT (OUTPATIENT)
Dept: INTERNAL MEDICINE | Facility: CLINIC | Age: 55
End: 2020-06-22
Payer: COMMERCIAL

## 2020-06-22 ENCOUNTER — HOSPITAL ENCOUNTER (OUTPATIENT)
Dept: RADIOLOGY | Facility: HOSPITAL | Age: 55
Discharge: HOME | End: 2020-06-22
Attending: FAMILY MEDICINE
Payer: COMMERCIAL

## 2020-06-22 ENCOUNTER — HOSPITAL ENCOUNTER (OUTPATIENT)
Dept: CARDIOLOGY | Facility: HOSPITAL | Age: 55
Discharge: HOME | End: 2020-06-22
Payer: COMMERCIAL

## 2020-06-22 VITALS
DIASTOLIC BLOOD PRESSURE: 74 MMHG | RESPIRATION RATE: 16 BRPM | BODY MASS INDEX: 23.38 KG/M2 | HEART RATE: 70 BPM | SYSTOLIC BLOOD PRESSURE: 104 MMHG | HEIGHT: 66 IN | WEIGHT: 145.5 LBS | TEMPERATURE: 98 F

## 2020-06-22 DIAGNOSIS — Z00.00 ROUTINE GENERAL MEDICAL EXAMINATION AT A HEALTH CARE FACILITY: Primary | ICD-10-CM

## 2020-06-22 DIAGNOSIS — I10 ESSENTIAL HYPERTENSION: ICD-10-CM

## 2020-06-22 DIAGNOSIS — J45.20 MILD INTERMITTENT ASTHMA WITHOUT COMPLICATION: ICD-10-CM

## 2020-06-22 DIAGNOSIS — Z00.00 ROUTINE GENERAL MEDICAL EXAMINATION AT A HEALTH CARE FACILITY: ICD-10-CM

## 2020-06-22 DIAGNOSIS — Z23 NEED FOR PROPHYLACTIC VACCINATION WITH STREPTOCOCCUS PNEUMONIAE (PNEUMOCOCCUS) AND INFLUENZA VACCINES: ICD-10-CM

## 2020-06-22 DIAGNOSIS — M81.0 AGE-RELATED OSTEOPOROSIS WITHOUT CURRENT PATHOLOGICAL FRACTURE: ICD-10-CM

## 2020-06-22 DIAGNOSIS — E78.2 MIXED HYPERLIPIDEMIA: ICD-10-CM

## 2020-06-22 DIAGNOSIS — R91.8 LUNG NODULE, MULTIPLE: ICD-10-CM

## 2020-06-22 LAB
ALBUMIN SERPL BCP-MCNC: 4.3 G/DL (ref 3.5–5.2)
ALP SERPL-CCNC: 51 U/L (ref 55–135)
ALT SERPL W/O P-5'-P-CCNC: 17 U/L (ref 10–44)
ANION GAP SERPL CALC-SCNC: 12 MMOL/L (ref 8–16)
AST SERPL-CCNC: 21 U/L (ref 10–40)
BILIRUB SERPL-MCNC: 0.9 MG/DL (ref 0.1–1)
BUN SERPL-MCNC: 20 MG/DL (ref 6–20)
CALCIUM SERPL-MCNC: 9.2 MG/DL (ref 8.7–10.5)
CHLORIDE SERPL-SCNC: 108 MMOL/L (ref 95–110)
CHOLEST SERPL-MCNC: 120 MG/DL (ref 120–199)
CHOLEST/HDLC SERPL: 2.4 {RATIO} (ref 2–5)
CO2 SERPL-SCNC: 26 MMOL/L (ref 23–29)
CREAT SERPL-MCNC: 0.7 MG/DL (ref 0.5–1.4)
ERYTHROCYTE [DISTWIDTH] IN BLOOD BY AUTOMATED COUNT: 12.7 % (ref 11.5–14.5)
EST. GFR  (AFRICAN AMERICAN): >60 ML/MIN/1.73 M^2
EST. GFR  (NON AFRICAN AMERICAN): >60 ML/MIN/1.73 M^2
GLUCOSE SERPL-MCNC: 80 MG/DL (ref 70–110)
HCT VFR BLD AUTO: 38.7 % (ref 37–48.5)
HDLC SERPL-MCNC: 50 MG/DL (ref 40–75)
HDLC SERPL: 41.7 % (ref 20–50)
HGB BLD-MCNC: 13.4 G/DL (ref 12–16)
LDLC SERPL CALC-MCNC: 58 MG/DL (ref 63–159)
MCH RBC QN AUTO: 29.9 PG (ref 27–31)
MCHC RBC AUTO-ENTMCNC: 34.6 G/DL (ref 32–36)
MCV RBC AUTO: 86 FL (ref 82–98)
NONHDLC SERPL-MCNC: 70 MG/DL
PLATELET # BLD AUTO: 152 K/UL (ref 150–350)
PMV BLD AUTO: 12.3 FL (ref 9.2–12.9)
POTASSIUM SERPL-SCNC: 4.8 MMOL/L (ref 3.5–5.1)
PROT SERPL-MCNC: 6.7 G/DL (ref 6–8.4)
RBC # BLD AUTO: 4.48 M/UL (ref 4–5.4)
SODIUM SERPL-SCNC: 146 MMOL/L (ref 136–145)
TRIGL SERPL-MCNC: 60 MG/DL (ref 30–150)
TSH SERPL DL<=0.005 MIU/L-ACNC: 1.66 UIU/ML (ref 0.4–4)
WBC # BLD AUTO: 4.24 K/UL (ref 3.9–12.7)

## 2020-06-22 PROCEDURE — 77067 SCR MAMMO BI INCL CAD: CPT | Mod: 26,,, | Performed by: RADIOLOGY

## 2020-06-22 PROCEDURE — 77067 MAMMO DIGITAL SCREENING BILAT WITH TOMOSYNTHESIS_CAD: ICD-10-PCS | Mod: 26,,, | Performed by: RADIOLOGY

## 2020-06-22 PROCEDURE — 3074F SYST BP LT 130 MM HG: CPT | Mod: CPTII,S$GLB,, | Performed by: FAMILY MEDICINE

## 2020-06-22 PROCEDURE — 93010 ELECTROCARDIOGRAM REPORT: CPT | Mod: ,,, | Performed by: INTERNAL MEDICINE

## 2020-06-22 PROCEDURE — 90732 PNEUMOCOCCAL POLYSACCHARIDE VACCINE 23-VALENT =>2YO SQ IM: ICD-10-PCS | Mod: S$GLB,,, | Performed by: FAMILY MEDICINE

## 2020-06-22 PROCEDURE — 93010 EKG 12-LEAD: ICD-10-PCS | Mod: ,,, | Performed by: INTERNAL MEDICINE

## 2020-06-22 PROCEDURE — 97802 PR MED NUTR THER, 1ST, INDIV, EA 15 MIN: ICD-10-PCS | Mod: S$GLB,,, | Performed by: INTERNAL MEDICINE

## 2020-06-22 PROCEDURE — 84443 ASSAY THYROID STIM HORMONE: CPT

## 2020-06-22 PROCEDURE — 97802 MEDICAL NUTRITION INDIV IN: CPT | Mod: S$GLB,,, | Performed by: INTERNAL MEDICINE

## 2020-06-22 PROCEDURE — 3078F DIAST BP <80 MM HG: CPT | Mod: CPTII,S$GLB,, | Performed by: FAMILY MEDICINE

## 2020-06-22 PROCEDURE — 99999 PR PBB SHADOW E&M-EST. PATIENT-LVL IV: ICD-10-PCS | Mod: PBBFAC,,, | Performed by: FAMILY MEDICINE

## 2020-06-22 PROCEDURE — 80061 LIPID PANEL: CPT

## 2020-06-22 PROCEDURE — 80053 COMPREHEN METABOLIC PANEL: CPT

## 2020-06-22 PROCEDURE — 77067 SCR MAMMO BI INCL CAD: CPT | Mod: TC

## 2020-06-22 PROCEDURE — 85027 COMPLETE CBC AUTOMATED: CPT

## 2020-06-22 PROCEDURE — 90471 IMMUNIZATION ADMIN: CPT | Mod: S$GLB,,, | Performed by: FAMILY MEDICINE

## 2020-06-22 PROCEDURE — 77063 BREAST TOMOSYNTHESIS BI: CPT | Mod: 26,,, | Performed by: RADIOLOGY

## 2020-06-22 PROCEDURE — 93005 ELECTROCARDIOGRAM TRACING: CPT

## 2020-06-22 PROCEDURE — 3078F PR MOST RECENT DIASTOLIC BLOOD PRESSURE < 80 MM HG: ICD-10-PCS | Mod: CPTII,S$GLB,, | Performed by: FAMILY MEDICINE

## 2020-06-22 PROCEDURE — 99386 PREV VISIT NEW AGE 40-64: CPT | Mod: 25,S$GLB,, | Performed by: FAMILY MEDICINE

## 2020-06-22 PROCEDURE — 77063 MAMMO DIGITAL SCREENING BILAT WITH TOMOSYNTHESIS_CAD: ICD-10-PCS | Mod: 26,,, | Performed by: RADIOLOGY

## 2020-06-22 PROCEDURE — 99999 PR PBB SHADOW E&M-EST. PATIENT-LVL IV: CPT | Mod: PBBFAC,,, | Performed by: FAMILY MEDICINE

## 2020-06-22 PROCEDURE — 83036 HEMOGLOBIN GLYCOSYLATED A1C: CPT

## 2020-06-22 PROCEDURE — 90732 PPSV23 VACC 2 YRS+ SUBQ/IM: CPT | Mod: S$GLB,,, | Performed by: FAMILY MEDICINE

## 2020-06-22 PROCEDURE — 99386 PR PREVENTIVE VISIT,NEW,40-64: ICD-10-PCS | Mod: 25,S$GLB,, | Performed by: FAMILY MEDICINE

## 2020-06-22 PROCEDURE — 90471 PNEUMOCOCCAL POLYSACCHARIDE VACCINE 23-VALENT =>2YO SQ IM: ICD-10-PCS | Mod: S$GLB,,, | Performed by: FAMILY MEDICINE

## 2020-06-22 PROCEDURE — 97750 PHYSICAL PERFORMANCE TEST: CPT | Performed by: PHYSICAL THERAPIST

## 2020-06-22 PROCEDURE — 3074F PR MOST RECENT SYSTOLIC BLOOD PRESSURE < 130 MM HG: ICD-10-PCS | Mod: CPTII,S$GLB,, | Performed by: FAMILY MEDICINE

## 2020-06-22 RX ORDER — PRAVASTATIN SODIUM 80 MG/1
TABLET ORAL
COMMUNITY
Start: 2020-06-20

## 2020-06-22 RX ORDER — ALENDRONATE SODIUM 70 MG/1
TABLET ORAL
COMMUNITY
Start: 2020-05-31 | End: 2022-04-20

## 2020-06-22 NOTE — PROGRESS NOTES
"Nutrition Assessment  Client name:  Linette Schmidt  :  1965  Age:  55 y.o.  Gender:  female    Client states:  Very pleasant spouse of a [employer] employee here for her annual physical with [employer] Food and exercise history provided below. Has maintained weight loss prior to last year's physical. Satisfied with progress. Would like to lose a few more pounds, and has taken action by deciding to workout with a  in the near future. States she is avoiding starches/carbohydrates as a means to not gain weight.    Anthropometrics  Height:  5' 6"     Weight:  145 lbs  BMI:  23.5  % Body Fat:  28.07%    Clinical Signs/Symptoms  N/V/D:  none  Appetite (Good, Fair, or Poor):  good      Past Medical History:   Diagnosis Date    Asthma        Past Surgical History:   Procedure Laterality Date    FINGER FRACTURE SURGERY Left     5th finger    HYSTERECTOMY      PARTIAL HYSTERECTOMY         Medications    has a current medication list which includes the following prescription(s): aspirin, pravastatin, proair hfa, ramipril, and sodium,potassium,mag sulfates.    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 Snack:  Sugar-free " wesly  *Fluid intake:  Water 64oz minimum (sometimes with added crystal light), diet coke (not often)    Exercise History:  Walking, inconsistently    Cultural/Spiritual/Personal Preferences:  None noted    Support System:  spouse    State of Change:  contemplation    Barriers to Change:  none    Diagnosis    Food and nutrition-related knowledge deficit related to avoidance of specific food groups as evidence by patient stating she avoids starches/carbohydrates to prevent weight gain.    Intervention    RMR (Method:  Per fitness exam, calculated and estimated using previous year's results):  1284 kcal  Activity Factor:  1.3  NICOLAS:  1669 calories    Goals:  1.  Lose 5 lbs over the next 3 months.  2.  Consistently exercise 3 times for 30 minutes each week.  3.  Include a starch/carbohydrate source at meals.      Nutrition Education  Lipid panel and glucose were available at time of nutrition consultation; reviewed with patient. Complimented patient on seeking out a  to begin including exercise into her weekly routine and promote further weight loss/maintenance. Briefly reviewed all handouts provided in nutrition folder. Discussed importance of each food group in a well-balanced diet. Provided examples of foods contained in each food group and how they can assist with weight loss/maintenance. Encouraged patient to call/email with any questions once leaving today's appointment.    Patient verbalized understanding of nutrition education and recommendations received.    Handouts Provided  Meal Planning Guide  Restaurant Guide  Eat Fit Shopping List  Eat Fit Genesis  Fast Food Guide  Heart-Healthy Nutrition Therapy  Satisfying Salads  My Plate Method  Healthy Snack List    Monitoring/Evaluation    Monitor the following:  Weight  BMI  % Body Fat  Caloric intake  Labs:  Lipid Panel, Glucose, HgbA1c    Follow Up Plan:  Communication with referring healthcare provider is unnecessary at this time as patient  presented as part of annual wellness exam.  However, will follow up with patient in 1-2 years.

## 2020-06-22 NOTE — PROGRESS NOTES
:  69    DX: Z00.0  Orders:  Fitness Evaluation    Patient's 3rd [facility] Fitness Component evaluation was completed.  Results are as follows:    Height (in):    66                            Weight (lbs):    145                                    BMI:   23.5    Resting Energy Expenditure:         1362       kcal/24 hrs.              Estimated:    1284     REE calculated using last year's result and estimated.  Not testing with calorimeter at this time.     Body Composition:          28.07  % body fat             Rating: Very Good    Waist to Hip Ratio:     0.78                    Risk:  Low   Hip taken over clothing:      Yes          Muscular Strength and Endurance Assessment:               Strength (lbs):     Right: 62.3             Rating:  average      Left:  57           Rating: average   Push-ups:   12                 Rating:  Very good   Curl-ups :   48                Rating:  Well above average    Flexibility Testing:   Sit and Reach (cm):    28                       Rating:  Good    Pt has done well keeping weight off.  Plans to start with  to regain strength and flexibility.     The patient completed the testing procedures without complications.

## 2020-06-22 NOTE — PROGRESS NOTES
"Subjective:       Patient ID: Linette Schmidt is a 55 y.o. female.    Chief Complaint: Establish Care    55-year-old female patient with Patient Active Problem List:     Mild intermittent asthma without complication     Mixed hyperlipidemia     Osteoporosis     Essential hypertension  Here for [facility] physical for Reny, has been taking her medications regularly  Patient denies any cough or night sweats, chest pain difficulty breathing, abdominal discomfort nausea vomiting.   Asthma has been stable    Review of Systems   Constitutional: Negative for fatigue.   Eyes: Negative for visual disturbance.   Respiratory: Negative for shortness of breath and wheezing.    Cardiovascular: Negative for chest pain and leg swelling.   Gastrointestinal: Negative for abdominal pain, nausea and vomiting.   Musculoskeletal: Negative for myalgias.   Skin: Negative for rash.   Neurological: Negative for light-headedness and headaches.   Psychiatric/Behavioral: Negative for sleep disturbance.         /74 (BP Location: Left arm, Patient Position: Sitting, BP Method: Medium (Automatic))   Pulse 70   Temp 97.5 °F (36.4 °C) (Tympanic)   Resp 16   Ht 5' 6" (1.676 m)   Wt 66 kg (145 lb 8.1 oz)   BMI 23.48 kg/m²   Objective:      Physical Exam  Constitutional:       Appearance: She is well-developed.   HENT:      Head: Normocephalic and atraumatic.   Cardiovascular:      Rate and Rhythm: Normal rate and regular rhythm.      Heart sounds: Normal heart sounds. No murmur.   Pulmonary:      Effort: Pulmonary effort is normal.      Breath sounds: Normal breath sounds. No wheezing.   Abdominal:      General: Bowel sounds are normal.      Palpations: Abdomen is soft.      Tenderness: There is no abdominal tenderness.   Skin:     General: Skin is warm and dry.      Findings: No rash.   Neurological:      Mental Status: She is alert and oriented to person, place, and time.         Clinical Support on 06/22/2020   Component " Date Value Ref Range Status    Sodium 06/22/2020 146* 136 - 145 mmol/L Final    Potassium 06/22/2020 4.8  3.5 - 5.1 mmol/L Final    Chloride 06/22/2020 108  95 - 110 mmol/L Final    CO2 06/22/2020 26  23 - 29 mmol/L Final    Glucose 06/22/2020 80  70 - 110 mg/dL Final    BUN, Bld 06/22/2020 20  6 - 20 mg/dL Final    Creatinine 06/22/2020 0.7  0.5 - 1.4 mg/dL Final    Calcium 06/22/2020 9.2  8.7 - 10.5 mg/dL Final    Total Protein 06/22/2020 6.7  6.0 - 8.4 g/dL Final    Albumin 06/22/2020 4.3  3.5 - 5.2 g/dL Final    Total Bilirubin 06/22/2020 0.9  0.1 - 1.0 mg/dL Final    Comment: For infants and newborns, interpretation of results should be based  on gestational age, weight and in agreement with clinical  observations.  Premature Infant recommended reference ranges:  Up to 24 hours.............<8.0 mg/dL  Up to 48 hours............<12.0 mg/dL  3-5 days..................<15.0 mg/dL  6-29 days.................<15.0 mg/dL      Alkaline Phosphatase 06/22/2020 51* 55 - 135 U/L Final    AST 06/22/2020 21  10 - 40 U/L Final    ALT 06/22/2020 17  10 - 44 U/L Final    Anion Gap 06/22/2020 12  8 - 16 mmol/L Final    eGFR if African American 06/22/2020 >60  >60 mL/min/1.73 m^2 Final    eGFR if non African American 06/22/2020 >60  >60 mL/min/1.73 m^2 Final    Comment: Calculation used to obtain the estimated glomerular filtration  rate (eGFR) is the CKD-EPI equation.       WBC 06/22/2020 4.24  3.90 - 12.70 K/uL Final    RBC 06/22/2020 4.48  4.00 - 5.40 M/uL Final    Hemoglobin 06/22/2020 13.4  12.0 - 16.0 g/dL Final    Hematocrit 06/22/2020 38.7  37.0 - 48.5 % Final    Mean Corpuscular Volume 06/22/2020 86  82 - 98 fL Final    Mean Corpuscular Hemoglobin 06/22/2020 29.9  27.0 - 31.0 pg Final    Mean Corpuscular Hemoglobin Conc 06/22/2020 34.6  32.0 - 36.0 g/dL Final    RDW 06/22/2020 12.7  11.5 - 14.5 % Final    Platelets 06/22/2020 152  150 - 350 K/uL Final    MPV 06/22/2020 12.3  9.2 - 12.9 fL  Final    Cholesterol 06/22/2020 120  120 - 199 mg/dL Final    Comment: The National Cholesterol Education Program (NCEP) has set the  following guidelines (reference ranges) for Cholesterol:  Optimal.....................<200 mg/dL  Borderline High.............200-239 mg/dL  High........................> or = 240 mg/dL      Triglycerides 06/22/2020 60  30 - 150 mg/dL Final    Comment: The National Cholesterol Education Program (NCEP) has set the  following guidelines (reference values) for triglycerides:  Normal......................<150 mg/dL  Borderline High.............150-199 mg/dL  High........................200-499 mg/dL      HDL 06/22/2020 50  40 - 75 mg/dL Final    Comment: The National Cholesterol Education Program (NCEP) has set the  following guidelines (reference values) for HDL Cholesterol:  Low...............<40 mg/dL  Optimal...........>60 mg/dL      LDL Cholesterol 06/22/2020 58.0* 63.0 - 159.0 mg/dL Final    Comment: The National Cholesterol Education Program (NCEP) has set the  following guidelines (reference values) for LDL Cholesterol:  Optimal.......................<130 mg/dL  Borderline High...............130-159 mg/dL  High..........................160-189 mg/dL  Very High.....................>190 mg/dL      Hdl/Cholesterol Ratio 06/22/2020 41.7  20.0 - 50.0 % Final    Total Cholesterol/HDL Ratio 06/22/2020 2.4  2.0 - 5.0 Final    Non-HDL Cholesterol 06/22/2020 70  mg/dL Final    Comment: Risk category and Non-HDL cholesterol goals:  Coronary heart disease (CHD)or equivalent (10-year risk of CHD >20%):  Non-HDL cholesterol goal     <130 mg/dL  Two or more CHD risk factors and 10-year risk of CHD <= 20%:  Non-HDL cholesterol goal     <160 mg/dL  0 to 1 CHD risk factor:  Non-HDL cholesterol goal     <190 mg/dL            Assessment/Plan:   1. Routine general medical examination at a health care facility  Vital signs stable today.  Clinical exam  Stable  Continue lifestyle modifications  with low-fat and low-cholesterol diet and exercise 30 min daily    2. Essential hypertension  Blood pressure is stable today currently taking ramipril 10 mg daily    3. Mixed hyperlipidemia  Currently on pravastatin 80 mg daily noted improvement in cholesterol levels    4. Age-related osteoporosis without current pathological fracture  Currently taking Fosamax weekly    5. Mild intermittent asthma without complication  Stable on albuterol inhaler as needed    6. Lung nodule, multiple  - CT Chest Without Contrast; Future  Will get CT scan of the chest follow-up on tiny lung nodules, if stable no further follow-up needed  Patient clinically asymptomatic    7. Need for prophylactic vaccination with Streptococcus pneumoniae (Pneumococcus) and Influenza vaccines  - (In Office Administered) Pneumococcal Polysaccharide Vaccine (23 Valent) (SQ/IM)   Pneumovax given today

## 2020-06-23 LAB
ESTIMATED AVG GLUCOSE: 88 MG/DL (ref 68–131)
HBA1C MFR BLD HPLC: 4.7 % (ref 4–5.6)

## 2020-06-25 ENCOUNTER — HOSPITAL ENCOUNTER (OUTPATIENT)
Dept: RADIOLOGY | Facility: HOSPITAL | Age: 55
Discharge: HOME | End: 2020-06-25
Attending: FAMILY MEDICINE
Payer: COMMERCIAL

## 2020-06-25 DIAGNOSIS — R91.8 LUNG NODULE, MULTIPLE: ICD-10-CM

## 2020-06-25 PROCEDURE — 71250 CT THORAX DX C-: CPT | Mod: TC

## 2020-06-25 PROCEDURE — 71250 CT CHEST WITHOUT CONTRAST: ICD-10-PCS | Mod: 26,,, | Performed by: RADIOLOGY

## 2020-06-25 PROCEDURE — 71250 CT THORAX DX C-: CPT | Mod: 26,,, | Performed by: RADIOLOGY

## 2020-10-13 ENCOUNTER — OFFICE VISIT (OUTPATIENT)
Dept: OPHTHALMOLOGY | Facility: CLINIC | Age: 55
End: 2020-10-13
Payer: COMMERCIAL

## 2020-10-13 DIAGNOSIS — H53.8 BLURRED VISION, BILATERAL: Primary | ICD-10-CM

## 2020-10-13 DIAGNOSIS — H52.13 MYOPIA WITH ASTIGMATISM AND PRESBYOPIA, BILATERAL: ICD-10-CM

## 2020-10-13 DIAGNOSIS — H52.203 MYOPIA WITH ASTIGMATISM AND PRESBYOPIA, BILATERAL: ICD-10-CM

## 2020-10-13 DIAGNOSIS — H52.4 MYOPIA WITH ASTIGMATISM AND PRESBYOPIA, BILATERAL: ICD-10-CM

## 2020-10-13 PROCEDURE — 99999 PR PBB SHADOW E&M-EST. PATIENT-LVL II: CPT | Mod: PBBFAC,,, | Performed by: OPTOMETRIST

## 2020-10-13 PROCEDURE — 92014 COMPRE OPH EXAM EST PT 1/>: CPT | Mod: S$GLB,,, | Performed by: OPTOMETRIST

## 2020-10-13 PROCEDURE — 99999 PR PBB SHADOW E&M-EST. PATIENT-LVL II: ICD-10-PCS | Mod: PBBFAC,,, | Performed by: OPTOMETRIST

## 2020-10-13 PROCEDURE — 92015 PR REFRACTION: ICD-10-PCS | Mod: S$GLB,,, | Performed by: OPTOMETRIST

## 2020-10-13 PROCEDURE — 92015 DETERMINE REFRACTIVE STATE: CPT | Mod: S$GLB,,, | Performed by: OPTOMETRIST

## 2020-10-13 PROCEDURE — 92014 PR EYE EXAM, EST PATIENT,COMPREHESV: ICD-10-PCS | Mod: S$GLB,,, | Performed by: OPTOMETRIST

## 2020-10-13 NOTE — PROGRESS NOTES
HPI     Eye Exam     Comments: Yearly              Comments     NP to DNL  Last seen by [facility] on 1/16/18 foryearly eye exam  Patient here today for yearly eye exam  HPI    Any vision changes since last exam: Cant read near   Eye pain: none  Other ocular symptoms: none    Do you wear currently wear glasses or contacts? glasses    Interested in contacts today? no    Do you plan on getting new glasses today? If needed              Last edited by Batool Castaneda, PCT on 10/13/2020  1:06 PM.   (History)            Assessment /Plan     For exam results, see Encounter Report.    Blurred vision, bilateral    Myopia with astigmatism and presbyopia, bilateral      Eyeglass Final Rx     Eyeglass Final Rx       Sphere Cylinder Axis Add    Right -3.00 +3.25 090 +2.25    Left -2.00 +2.25 105 +2.25    Expiration Date: 10/14/2021                RTC 1 yr for undilated eye exam or PRN if any problems.   Discussed above and answered questions.

## 2021-03-22 LAB — CRC RECOMMENDATION EXT: NORMAL

## 2021-04-28 ENCOUNTER — PATIENT MESSAGE (OUTPATIENT)
Dept: RESEARCH | Facility: HOSPITAL | Age: 56
End: 2021-04-28

## 2021-06-11 ENCOUNTER — TELEPHONE (OUTPATIENT)
Dept: OBSTETRICS AND GYNECOLOGY | Facility: CLINIC | Age: 56
End: 2021-06-11

## 2021-07-22 ENCOUNTER — CLINICAL SUPPORT (OUTPATIENT)
Dept: INTERNAL MEDICINE | Facility: CLINIC | Age: 56
End: 2021-07-22
Payer: COMMERCIAL

## 2021-07-22 ENCOUNTER — OFFICE VISIT (OUTPATIENT)
Dept: INTERNAL MEDICINE | Facility: CLINIC | Age: 56
End: 2021-07-22
Payer: COMMERCIAL

## 2021-07-22 ENCOUNTER — CLINICAL SUPPORT (OUTPATIENT)
Dept: REHABILITATION | Facility: HOSPITAL | Age: 56
End: 2021-07-22
Payer: COMMERCIAL

## 2021-07-22 VITALS
HEART RATE: 73 BPM | WEIGHT: 149.94 LBS | BODY MASS INDEX: 24.1 KG/M2 | HEIGHT: 66 IN | SYSTOLIC BLOOD PRESSURE: 101 MMHG | RESPIRATION RATE: 16 BRPM | TEMPERATURE: 98 F | DIASTOLIC BLOOD PRESSURE: 68 MMHG

## 2021-07-22 DIAGNOSIS — I10 ESSENTIAL HYPERTENSION: ICD-10-CM

## 2021-07-22 DIAGNOSIS — Z00.00 ROUTINE GENERAL MEDICAL EXAMINATION AT A HEALTH CARE FACILITY: ICD-10-CM

## 2021-07-22 DIAGNOSIS — Z00.00 ROUTINE GENERAL MEDICAL EXAMINATION AT A HEALTH CARE FACILITY: Primary | ICD-10-CM

## 2021-07-22 DIAGNOSIS — J45.20 MILD INTERMITTENT ASTHMA WITHOUT COMPLICATION: ICD-10-CM

## 2021-07-22 DIAGNOSIS — E78.2 MIXED HYPERLIPIDEMIA: ICD-10-CM

## 2021-07-22 DIAGNOSIS — M81.0 AGE-RELATED OSTEOPOROSIS WITHOUT CURRENT PATHOLOGICAL FRACTURE: ICD-10-CM

## 2021-07-22 LAB
ALBUMIN SERPL BCP-MCNC: 4.6 G/DL (ref 3.5–5.2)
ALP SERPL-CCNC: 49 U/L (ref 55–135)
ALT SERPL W/O P-5'-P-CCNC: 19 U/L (ref 10–44)
ANION GAP SERPL CALC-SCNC: 11 MMOL/L (ref 8–16)
AST SERPL-CCNC: 22 U/L (ref 10–40)
BILIRUB SERPL-MCNC: 1.4 MG/DL (ref 0.1–1)
BUN SERPL-MCNC: 24 MG/DL (ref 6–20)
CALCIUM SERPL-MCNC: 9.8 MG/DL (ref 8.7–10.5)
CHLORIDE SERPL-SCNC: 106 MMOL/L (ref 95–110)
CHOLEST SERPL-MCNC: 141 MG/DL (ref 120–199)
CHOLEST/HDLC SERPL: 2.4 {RATIO} (ref 2–5)
CO2 SERPL-SCNC: 28 MMOL/L (ref 23–29)
CREAT SERPL-MCNC: 0.8 MG/DL (ref 0.5–1.4)
ERYTHROCYTE [DISTWIDTH] IN BLOOD BY AUTOMATED COUNT: 12.3 % (ref 11.5–14.5)
EST. GFR  (AFRICAN AMERICAN): >60 ML/MIN/1.73 M^2
EST. GFR  (NON AFRICAN AMERICAN): >60 ML/MIN/1.73 M^2
ESTIMATED AVG GLUCOSE: 85 MG/DL (ref 68–131)
GLUCOSE SERPL-MCNC: 80 MG/DL (ref 70–110)
HBA1C MFR BLD: 4.6 % (ref 4–5.6)
HCT VFR BLD AUTO: 39.9 % (ref 37–48.5)
HDLC SERPL-MCNC: 58 MG/DL (ref 40–75)
HDLC SERPL: 41.1 % (ref 20–50)
HGB BLD-MCNC: 14 G/DL (ref 12–16)
LDLC SERPL CALC-MCNC: 68.6 MG/DL (ref 63–159)
MCH RBC QN AUTO: 29.7 PG (ref 27–31)
MCHC RBC AUTO-ENTMCNC: 35.1 G/DL (ref 32–36)
MCV RBC AUTO: 85 FL (ref 82–98)
NONHDLC SERPL-MCNC: 83 MG/DL
PLATELET # BLD AUTO: 174 K/UL (ref 150–450)
PMV BLD AUTO: 12.1 FL (ref 9.2–12.9)
POTASSIUM SERPL-SCNC: 4.3 MMOL/L (ref 3.5–5.1)
PROT SERPL-MCNC: 7.3 G/DL (ref 6–8.4)
RBC # BLD AUTO: 4.72 M/UL (ref 4–5.4)
SODIUM SERPL-SCNC: 145 MMOL/L (ref 136–145)
TRIGL SERPL-MCNC: 72 MG/DL (ref 30–150)
TSH SERPL DL<=0.005 MIU/L-ACNC: 1.73 UIU/ML (ref 0.4–4)
WBC # BLD AUTO: 4.68 K/UL (ref 3.9–12.7)

## 2021-07-22 PROCEDURE — 80061 LIPID PANEL: CPT | Performed by: FAMILY MEDICINE

## 2021-07-22 PROCEDURE — 99396 PREV VISIT EST AGE 40-64: CPT | Mod: S$GLB,,, | Performed by: FAMILY MEDICINE

## 2021-07-22 PROCEDURE — 3074F SYST BP LT 130 MM HG: CPT | Mod: CPTII,S$GLB,, | Performed by: FAMILY MEDICINE

## 2021-07-22 PROCEDURE — 3078F DIAST BP <80 MM HG: CPT | Mod: CPTII,S$GLB,, | Performed by: FAMILY MEDICINE

## 2021-07-22 PROCEDURE — 83036 HEMOGLOBIN GLYCOSYLATED A1C: CPT | Performed by: FAMILY MEDICINE

## 2021-07-22 PROCEDURE — 99999 PR PBB SHADOW E&M-EST. PATIENT-LVL III: ICD-10-PCS | Mod: PBBFAC,,, | Performed by: FAMILY MEDICINE

## 2021-07-22 PROCEDURE — 1126F PR PAIN SEVERITY QUANTIFIED, NO PAIN PRESENT: ICD-10-PCS | Mod: CPTII,S$GLB,, | Performed by: FAMILY MEDICINE

## 2021-07-22 PROCEDURE — 80053 COMPREHEN METABOLIC PANEL: CPT | Performed by: FAMILY MEDICINE

## 2021-07-22 PROCEDURE — 97802 MEDICAL NUTRITION INDIV IN: CPT | Mod: S$GLB,,, | Performed by: INTERNAL MEDICINE

## 2021-07-22 PROCEDURE — 3074F PR MOST RECENT SYSTOLIC BLOOD PRESSURE < 130 MM HG: ICD-10-PCS | Mod: CPTII,S$GLB,, | Performed by: FAMILY MEDICINE

## 2021-07-22 PROCEDURE — 3008F BODY MASS INDEX DOCD: CPT | Mod: CPTII,S$GLB,, | Performed by: FAMILY MEDICINE

## 2021-07-22 PROCEDURE — 3078F PR MOST RECENT DIASTOLIC BLOOD PRESSURE < 80 MM HG: ICD-10-PCS | Mod: CPTII,S$GLB,, | Performed by: FAMILY MEDICINE

## 2021-07-22 PROCEDURE — 99999 PR PBB SHADOW E&M-EST. PATIENT-LVL III: CPT | Mod: PBBFAC,,, | Performed by: FAMILY MEDICINE

## 2021-07-22 PROCEDURE — 3008F PR BODY MASS INDEX (BMI) DOCUMENTED: ICD-10-PCS | Mod: CPTII,S$GLB,, | Performed by: FAMILY MEDICINE

## 2021-07-22 PROCEDURE — 84443 ASSAY THYROID STIM HORMONE: CPT | Performed by: FAMILY MEDICINE

## 2021-07-22 PROCEDURE — 99396 PR PREVENTIVE VISIT,EST,40-64: ICD-10-PCS | Mod: S$GLB,,, | Performed by: FAMILY MEDICINE

## 2021-07-22 PROCEDURE — 85027 COMPLETE CBC AUTOMATED: CPT | Performed by: FAMILY MEDICINE

## 2021-07-22 PROCEDURE — 1126F AMNT PAIN NOTED NONE PRSNT: CPT | Mod: CPTII,S$GLB,, | Performed by: FAMILY MEDICINE

## 2021-07-22 PROCEDURE — 97802 PR MED NUTR THER, 1ST, INDIV, EA 15 MIN: ICD-10-PCS | Mod: S$GLB,,, | Performed by: INTERNAL MEDICINE

## 2021-07-22 PROCEDURE — 97750 PHYSICAL PERFORMANCE TEST: CPT | Performed by: PHYSICAL THERAPIST

## 2022-04-05 ENCOUNTER — OFFICE VISIT (OUTPATIENT)
Dept: OBSTETRICS AND GYNECOLOGY | Facility: CLINIC | Age: 57
End: 2022-04-05
Payer: COMMERCIAL

## 2022-04-05 VITALS
HEIGHT: 66 IN | WEIGHT: 168.88 LBS | BODY MASS INDEX: 27.14 KG/M2 | DIASTOLIC BLOOD PRESSURE: 80 MMHG | SYSTOLIC BLOOD PRESSURE: 112 MMHG

## 2022-04-05 DIAGNOSIS — M81.0 AGE-RELATED OSTEOPOROSIS WITHOUT CURRENT PATHOLOGICAL FRACTURE: ICD-10-CM

## 2022-04-05 DIAGNOSIS — Z01.419 WELL WOMAN EXAM WITH ROUTINE GYNECOLOGICAL EXAM: Primary | ICD-10-CM

## 2022-04-05 DIAGNOSIS — L90.0 LICHEN SCLEROSUS: ICD-10-CM

## 2022-04-05 DIAGNOSIS — N90.7 VULVAR CYST: ICD-10-CM

## 2022-04-05 DIAGNOSIS — Z12.31 ENCOUNTER FOR SCREENING MAMMOGRAM FOR MALIGNANT NEOPLASM OF BREAST: ICD-10-CM

## 2022-04-05 PROCEDURE — 99386 PREV VISIT NEW AGE 40-64: CPT | Mod: S$GLB,,, | Performed by: OBSTETRICS & GYNECOLOGY

## 2022-04-05 PROCEDURE — 99386 PR PREVENTIVE VISIT,NEW,40-64: ICD-10-PCS | Mod: S$GLB,,, | Performed by: OBSTETRICS & GYNECOLOGY

## 2022-04-05 PROCEDURE — 99999 PR PBB SHADOW E&M-EST. PATIENT-LVL III: CPT | Mod: PBBFAC,,, | Performed by: OBSTETRICS & GYNECOLOGY

## 2022-04-05 PROCEDURE — 99999 PR PBB SHADOW E&M-EST. PATIENT-LVL III: ICD-10-PCS | Mod: PBBFAC,,, | Performed by: OBSTETRICS & GYNECOLOGY

## 2022-04-05 RX ORDER — CLOBETASOL PROPIONATE 0.5 MG/G
OINTMENT TOPICAL
Qty: 60 G | Refills: 1 | Status: SHIPPED | OUTPATIENT
Start: 2022-04-05 | End: 2022-08-11

## 2022-04-05 RX ORDER — SULFAMETHOXAZOLE AND TRIMETHOPRIM 800; 160 MG/1; MG/1
1 TABLET ORAL 2 TIMES DAILY
Qty: 14 TABLET | Refills: 0 | Status: SHIPPED | OUTPATIENT
Start: 2022-04-05 | End: 2022-04-12

## 2022-04-05 RX ORDER — MUPIROCIN 20 MG/G
OINTMENT TOPICAL
Qty: 15 G | Refills: 0 | Status: SHIPPED | OUTPATIENT
Start: 2022-04-05 | End: 2022-08-11

## 2022-04-05 NOTE — PROGRESS NOTES
Subjective:       Patient ID: Linette Schmidt is a 56 y.o. female.    Chief Complaint:  Annual Exam      History of Present Illness  HPI  Presents for well-woman exam.  Pt has hx of hysterectomy for benign indications.  Still has both ovaries.  She notes some discoloration and a tender bump on the vulva.  It came and resolved, but has returned.  She is MM with her .  MM2020: normal  Colon cancer screening 6 months ago was normal  Has osteoporosis, on fosamax.  Would like referral to rheumatology    GYN & OB History  No LMP recorded. Patient has had a hysterectomy.   Date of Last Pap: No result found    OB History    Para Term  AB Living   2 2 2         SAB IAB Ectopic Multiple Live Births                  # Outcome Date GA Lbr Dylan/2nd Weight Sex Delivery Anes PTL Lv   2 Term            1 Term                Review of Systems  Review of Systems   Constitutional: Negative for fatigue, fever and unexpected weight change.   Gastrointestinal: Negative for abdominal pain, bloating, blood in stool, constipation, diarrhea, nausea and vomiting.   Endocrine: Negative for hot flashes.   Genitourinary: Positive for genital sores. Negative for decreased libido, dyspareunia, dysuria, flank pain, frequency, hematuria, pelvic pain, urgency, vaginal bleeding, vaginal discharge, vaginal pain, urinary incontinence, postcoital bleeding, postmenopausal bleeding and vaginal odor.   Integumentary:  Negative for rash, hair changes, breast mass, nipple discharge and breast skin changes.   Psychiatric/Behavioral: Negative for depression. The patient is not nervous/anxious.    Breast: Negative for mass, mastodynia, nipple discharge and skin changes          Objective:    Physical Exam:   Constitutional: She is oriented to person, place, and time. She appears well-developed and well-nourished. No distress.      Neck: No thyromegaly present.     Pulmonary/Chest: Right breast exhibits no inverted nipple, no mass,  no nipple discharge, no skin change, no tenderness, no bleeding and no swelling. Left breast exhibits no inverted nipple, no mass, no nipple discharge, no skin change, no tenderness, no bleeding and no swelling. Breasts are symmetrical.        Abdominal: Soft. She exhibits no distension and no mass. There is no abdominal tenderness. There is no rebound and no guarding. Hernia confirmed negative in the right inguinal area and confirmed negative in the left inguinal area.     Genitourinary:    Vagina normal.            Pelvic exam was performed with patient supine.   There is no rash, tenderness, lesion or injury on the right labia. There is no rash, tenderness, lesion or injury on the left labia. Right adnexum displays no mass, no tenderness and no fullness. Left adnexum displays no mass, no tenderness and no fullness. No erythema,  no vaginal discharge, tenderness, bleeding, rectocele, cystocele or unspecified prolapse of vaginal walls in the vagina.    No foreign body in the vagina.      No signs of injury in the vagina.   Cervix is absent.Uterus is absent.               Neurological: She is alert and oriented to person, place, and time.     Psychiatric: She has a normal mood and affect.          Assessment:        1. Well woman exam with routine gynecological exam    2. Encounter for screening mammogram for malignant neoplasm of breast    3. Age-related osteoporosis without current pathological fracture                Plan:      Linette was seen today for annual exam.    Diagnoses and all orders for this visit:    Well woman exam with routine gynecological exam    Encounter for screening mammogram for malignant neoplasm of breast  -     Mammo Digital Screening Bilat w/ Ric; Future    Age-related osteoporosis without current pathological fracture  -     Ambulatory referral/consult to Rheumatology; Future    Vulvar cyst  -     sulfamethoxazole-trimethoprim 800-160mg (BACTRIM DS) 800-160 mg Tab; Take 1 tablet by  mouth 2 (two) times daily. for 7 days  -     mupirocin (BACTROBAN) 2 % ointment; Apply to vulvar bump twice per day for 5 days    Lichen sclerosus  -     clobetasol 0.05% (TEMOVATE) 0.05 % Oint; Apply a thin film to the vulvar skin twice per day    Apply warm moist compresses to the vulvar cyst, as this appears to be an inflammed sebaceous cyst.  Topical bactroban and oral bactrim x 7 days.  Reviewed finding of lichen sclerosus.  Apply topical clobetasol bid until she sees me in 4-6 weeks  RTC 4-6 weeks to f/u vulvar findings and do MMG.

## 2022-04-20 ENCOUNTER — OFFICE VISIT (OUTPATIENT)
Dept: OBSTETRICS AND GYNECOLOGY | Facility: CLINIC | Age: 57
End: 2022-04-20
Payer: COMMERCIAL

## 2022-04-20 VITALS
DIASTOLIC BLOOD PRESSURE: 60 MMHG | WEIGHT: 168.19 LBS | BODY MASS INDEX: 27.03 KG/M2 | HEIGHT: 66 IN | SYSTOLIC BLOOD PRESSURE: 130 MMHG

## 2022-04-20 DIAGNOSIS — N90.7 VULVAR CYST: Primary | ICD-10-CM

## 2022-04-20 PROCEDURE — 10060 INCISION AND DRAINAGE: ICD-10-PCS | Mod: S$GLB,,, | Performed by: NURSE PRACTITIONER

## 2022-04-20 PROCEDURE — 99212 PR OFFICE/OUTPT VISIT, EST, LEVL II, 10-19 MIN: ICD-10-PCS | Mod: 25,S$GLB,, | Performed by: NURSE PRACTITIONER

## 2022-04-20 PROCEDURE — 10060 I&D ABSCESS SIMPLE/SINGLE: CPT | Mod: S$GLB,,, | Performed by: NURSE PRACTITIONER

## 2022-04-20 PROCEDURE — 99212 OFFICE O/P EST SF 10 MIN: CPT | Mod: 25,S$GLB,, | Performed by: NURSE PRACTITIONER

## 2022-04-20 PROCEDURE — 87070 CULTURE OTHR SPECIMN AEROBIC: CPT | Performed by: NURSE PRACTITIONER

## 2022-04-20 PROCEDURE — 99999 PR PBB SHADOW E&M-EST. PATIENT-LVL III: ICD-10-PCS | Mod: PBBFAC,,, | Performed by: NURSE PRACTITIONER

## 2022-04-20 PROCEDURE — 99999 PR PBB SHADOW E&M-EST. PATIENT-LVL III: CPT | Mod: PBBFAC,,, | Performed by: NURSE PRACTITIONER

## 2022-04-20 RX ORDER — ALENDRONATE SODIUM 70 MG/1
1 TABLET ORAL
COMMUNITY
End: 2022-07-06 | Stop reason: SDUPTHER

## 2022-04-20 RX ORDER — HYDROCHLOROTHIAZIDE 12.5 MG/1
12.5 CAPSULE ORAL DAILY
COMMUNITY
End: 2023-03-21

## 2022-04-20 NOTE — PROGRESS NOTES
"    Chief Complaint   Patient presents with    Bartholin's Cyst        Linette Schmidt is a 57 y.o. female    Complains of Bartholin cyst that has gotten bigger since it was seen by Dr. Bar a couple weeks ago, states that she was given antibiotics but instead of the cyst getting smaller, it increased in size and is much more tender.     Past Medical History:   Diagnosis Date    Asthma     Osteoporosis      Past Surgical History:   Procedure Laterality Date    FINGER FRACTURE SURGERY Left     5th finger    HYSTERECTOMY      PARTIAL HYSTERECTOMY       Social History     Tobacco Use    Smoking status: Never Smoker    Smokeless tobacco: Never Used   Substance Use Topics    Alcohol use: Yes     Comment: socially    Drug use: No     Family History   Problem Relation Age of Onset    Hypertension Mother     Hypertension Father     Hypertension Sister     Breast cancer Neg Hx     Colon cancer Neg Hx     Ovarian cancer Neg Hx      OB History    Para Term  AB Living   2 2 2         SAB IAB Ectopic Multiple Live Births                  # Outcome Date GA Lbr Dylan/2nd Weight Sex Delivery Anes PTL Lv   2 Term            1 Term                Medication List with Changes/Refills   Current Medications    ALENDRONATE (FOSAMAX) 70 MG TABLET    Take 1 tablet by mouth every 7 days.    ASPIRIN (ECOTRIN) 81 MG EC TABLET    Take 81 mg by mouth once daily.    CLOBETASOL 0.05% (TEMOVATE) 0.05 % OINT    Apply a thin film to the vulvar skin twice per day    HYDROCHLOROTHIAZIDE (MICROZIDE) 12.5 MG CAPSULE    Take 12.5 mg by mouth once daily.    MUPIROCIN (BACTROBAN) 2 % OINTMENT    Apply to vulvar bump twice per day for 5 days    PRAVASTATIN (PRAVACHOL) 80 MG TABLET        PROAIR HFA 90 MCG/ACTUATION INHALER        RAMIPRIL (ALTACE) 10 MG CAPSULE    TK ONE C PO  ONCE D   Discontinued Medications    ALENDRONATE (FOSAMAX) 70 MG TABLET          /60   Ht 5' 6" (1.676 m)   Wt 76.3 kg (168 lb 3.4 " oz)   BMI 27.15 kg/m²     ROS:  Review of Systems      PHYSICAL EXAM:  Physical Exam  Genitourinary:                   ASSESSMENT and PLAN:    ICD-10-CM ICD-9-CM    1. Vulvar cyst  N90.7 624.8 Aerobic culture       Claribel Ferrer, NP

## 2022-04-23 LAB — BACTERIA SPEC AEROBE CULT: NORMAL

## 2022-05-17 ENCOUNTER — OFFICE VISIT (OUTPATIENT)
Dept: OBSTETRICS AND GYNECOLOGY | Facility: CLINIC | Age: 57
End: 2022-05-17
Payer: COMMERCIAL

## 2022-05-17 VITALS
SYSTOLIC BLOOD PRESSURE: 112 MMHG | BODY MASS INDEX: 26.44 KG/M2 | WEIGHT: 163.81 LBS | DIASTOLIC BLOOD PRESSURE: 80 MMHG

## 2022-05-17 DIAGNOSIS — L90.0 LICHEN SCLEROSUS: Primary | ICD-10-CM

## 2022-05-17 DIAGNOSIS — N90.7 VULVAR CYST: ICD-10-CM

## 2022-05-17 DIAGNOSIS — N94.10 DYSPAREUNIA, FEMALE: ICD-10-CM

## 2022-05-17 PROCEDURE — 99999 PR PBB SHADOW E&M-EST. PATIENT-LVL III: CPT | Mod: PBBFAC,,, | Performed by: OBSTETRICS & GYNECOLOGY

## 2022-05-17 PROCEDURE — 99213 PR OFFICE/OUTPT VISIT, EST, LEVL III, 20-29 MIN: ICD-10-PCS | Mod: S$GLB,,, | Performed by: OBSTETRICS & GYNECOLOGY

## 2022-05-17 PROCEDURE — 99213 OFFICE O/P EST LOW 20 MIN: CPT | Mod: S$GLB,,, | Performed by: OBSTETRICS & GYNECOLOGY

## 2022-05-17 PROCEDURE — 99999 PR PBB SHADOW E&M-EST. PATIENT-LVL III: ICD-10-PCS | Mod: PBBFAC,,, | Performed by: OBSTETRICS & GYNECOLOGY

## 2022-05-17 NOTE — PATIENT INSTRUCTIONS
Use clobetasol ointment every other day x 1 week  Then twice weekly x 1 week  Then as needed for vulvar itching      Gently exfoliate the skin over the vulvar cyst daily with a warm washrag.    Apply Aquaphor ointment to the vulvar skin 1-2 times per day

## 2022-05-17 NOTE — PROGRESS NOTES
Subjective:       Patient ID: Linette Schmidt is a 57 y.o. female.    Chief Complaint:  Follow-up vulvar cyst    History of Present Illness  HPI  Presents to f/u lichen sclerosus and her vulvar cyst.  Pt has been using clobetasol ointment daily.  Notices decreased irritation and vulvar erythema.  Skin is still pale.  [place] is still painful during insertion.  Interested in trying vaginal estrogen.  Also, pt had an inflammed sebaceous cyst on the right labium we tried treating with bactrim, but cyst got larger.  Required I&D in the office on 22.  Culture was negative.  Cyst is much smaller, no longer draining.    GYN & OB History  No LMP recorded. Patient has had a hysterectomy.   Date of Last Pap: No result found    OB History    Para Term  AB Living   2 2 2         SAB IAB Ectopic Multiple Live Births                  # Outcome Date GA Lbr Dylan/2nd Weight Sex Delivery Anes PTL Lv   2 Term            1 Term                Review of Systems  Review of Systems   Constitutional: Negative for fatigue, fever and unexpected weight change.   Gastrointestinal: Negative for abdominal pain, constipation, diarrhea, nausea and vomiting.   Genitourinary: Positive for dyspareunia and vaginal pain. Negative for dysuria, frequency, urgency, vaginal bleeding, vaginal discharge, postcoital bleeding and vaginal odor.           Objective:    Physical Exam:   Constitutional: She is oriented to person, place, and time. She appears well-developed and well-nourished. No distress.             Abdominal: Soft. She exhibits no distension and no mass. There is no abdominal tenderness. There is no rebound and no guarding. Hernia confirmed negative in the right inguinal area and confirmed negative in the left inguinal area.     Genitourinary:    Vagina normal.            Pelvic exam was performed with patient supine.   There is no rash, tenderness, lesion or injury on the right labia. There is no rash, tenderness,  lesion or injury on the left labia. Right adnexum displays no mass, no tenderness and no fullness. Left adnexum displays no mass, no tenderness and no fullness. No erythema,  no vaginal discharge, tenderness, bleeding, rectocele, cystocele or unspecified prolapse of vaginal walls in the vagina.    No foreign body in the vagina.      No signs of injury in the vagina.   Vaginal atrophy noted. Cervix is absent.Uterus is absent.               Neurological: She is alert and oriented to person, place, and time.     Psychiatric: She has a normal mood and affect.          Assessment:        1. Lichen sclerosus    2. Dyspareunia, female    3. Vulvar cyst                Plan:      Diagnoses and all orders for this visit:    Lichen sclerosus    Dyspareunia, female  -     conjugated estrogens (PREMARIN) vaginal cream; Place 1 g vaginally twice a week.    Vulvar cyst    Can wean clobetasol ointment to prn use.  Apply Aquaphor barrier ointment daily to the vulva  Gently exfoliate skin over vulvar cyst daily with warm washrag.  RTC 1 year or sooner prn.

## 2022-05-18 ENCOUNTER — OFFICE VISIT (OUTPATIENT)
Dept: RHEUMATOLOGY | Facility: CLINIC | Age: 57
End: 2022-05-18
Payer: COMMERCIAL

## 2022-05-18 ENCOUNTER — HOSPITAL ENCOUNTER (OUTPATIENT)
Dept: RADIOLOGY | Facility: HOSPITAL | Age: 57
Discharge: HOME | End: 2022-05-18
Attending: OBSTETRICS & GYNECOLOGY
Payer: COMMERCIAL

## 2022-05-18 VITALS
BODY MASS INDEX: 26.43 KG/M2 | DIASTOLIC BLOOD PRESSURE: 80 MMHG | HEIGHT: 66 IN | SYSTOLIC BLOOD PRESSURE: 118 MMHG | WEIGHT: 164.44 LBS | HEART RATE: 72 BPM

## 2022-05-18 DIAGNOSIS — Z71.9 COUNSELING, UNSPECIFIED: ICD-10-CM

## 2022-05-18 DIAGNOSIS — M81.0 AGE-RELATED OSTEOPOROSIS WITHOUT CURRENT PATHOLOGICAL FRACTURE: Primary | ICD-10-CM

## 2022-05-18 DIAGNOSIS — Z51.81 MEDICATION MONITORING ENCOUNTER: ICD-10-CM

## 2022-05-18 DIAGNOSIS — Z12.31 ENCOUNTER FOR SCREENING MAMMOGRAM FOR MALIGNANT NEOPLASM OF BREAST: ICD-10-CM

## 2022-05-18 PROCEDURE — 99999 PR PBB SHADOW E&M-EST. PATIENT-LVL V: ICD-10-PCS | Mod: PBBFAC,,,

## 2022-05-18 PROCEDURE — 99205 PR OFFICE/OUTPT VISIT, NEW, LEVL V, 60-74 MIN: ICD-10-PCS | Mod: S$GLB,,,

## 2022-05-18 PROCEDURE — 77063 BREAST TOMOSYNTHESIS BI: CPT | Mod: 26,,, | Performed by: RADIOLOGY

## 2022-05-18 PROCEDURE — 77063 BREAST TOMOSYNTHESIS BI: CPT | Mod: TC

## 2022-05-18 PROCEDURE — 77063 MAMMO DIGITAL SCREENING BILAT WITH TOMO: ICD-10-PCS | Mod: 26,,, | Performed by: RADIOLOGY

## 2022-05-18 PROCEDURE — 77067 MAMMO DIGITAL SCREENING BILAT WITH TOMO: ICD-10-PCS | Mod: 26,,, | Performed by: RADIOLOGY

## 2022-05-18 PROCEDURE — 99999 PR PBB SHADOW E&M-EST. PATIENT-LVL V: CPT | Mod: PBBFAC,,,

## 2022-05-18 PROCEDURE — 77067 SCR MAMMO BI INCL CAD: CPT | Mod: 26,,, | Performed by: RADIOLOGY

## 2022-05-18 PROCEDURE — 99205 OFFICE O/P NEW HI 60 MIN: CPT | Mod: S$GLB,,,

## 2022-05-18 RX ORDER — ASCORBIC ACID 500 MG
500 TABLET ORAL DAILY
COMMUNITY
End: 2023-08-09

## 2022-05-18 NOTE — Clinical Note
Thank you for the consult!   We will update her bone density to evaluate Fosamax's efficacy and determine future treatment from there.   Zaida Rangel PA-C

## 2022-05-18 NOTE — PROGRESS NOTES
RHEUMATOLOGY OUTPATIENT CLINIC NOTE    05/18/2022    Subjective:       Patient ID: Linette Schmidt is a 57 y.o. female.    Chief Complaint: Osteoporosis    HPI     Linette Schmidt is a 57 y.o. pleasant female here for rheumatology initial evaluation for osteoporosis. Referred by Dr. Bar.     Patient states she attended a bone health assessment for her husbands work in 2019. Dexa results showed osteoporosis. Her PCP repeated the dexa which revealed the same results and she was started on fosamax 70 mg weekly shortly after (June 2019). She has been taking fosamax appropriately once a week first thing in am with full glass of water prior to any other medications or food. Also taking calcium and vitamin D supplements although she is unsure of dosage.     Osteoporosis questionnaire  Current meds for osteopenia/ osteoporosis: Fosamax 6/2019-present  Prior meds for osteopenia/ osteoporosis: None prior to fosamax  Prev dexa scan:  06/2019 osteoporosis at spine with significant decline in BMD at spine and hip  Vit D supplement:  Yes. Unsure current dosage  Menopause:  2/2 hysterectomy  Hystrectomy: Early 40s   HRT: None  Smoker/tobacco: None  Etoh: One drink a week or less  Falls: None.   Activity level: Walk  Kidney problems : No   Prev fracture :  Broken ribs from coughing with pneumonia. Left pinky finger got caught in swing chain. Broken toe from trauma.   Steroids : No  Family history :  Father - on medication for years and had femur fracture while on medication.   PPI/gerd:  No  Other risk factors : None  Dental issues: No  Anticipated dental work :  No  H/o cancer/tx: No      One recent fall falling off of golf cart at work. No resulting fracture. Injured her left hand which bruised but has since improved.   Rheumatologic review of systems negative otherwise.   Past Medical History:   Diagnosis Date    Asthma     Osteoporosis      Past Surgical History:   Procedure Laterality Date    FINGER  "FRACTURE SURGERY Left     5th finger    HYSTERECTOMY      PARTIAL HYSTERECTOMY       Family History   Problem Relation Age of Onset    Hypertension Mother     Hypertension Father     Hypertension Sister     Breast cancer Neg Hx     Colon cancer Neg Hx     Ovarian cancer Neg Hx      Social History     Socioeconomic History    Marital status:     Number of children: 2   Tobacco Use    Smoking status: Never Smoker    Smokeless tobacco: Never Used   Substance and Sexual Activity    Alcohol use: Yes     Comment: socially    Drug use: No    Sexual activity: Yes     Partners: Male     Birth control/protection: See Surgical Hx     Review of patient's allergies indicates:  No Known Allergies        Objective:   /80   Pulse 72   Ht 5' 6" (1.676 m)   Wt 74.6 kg (164 lb 7.4 oz)   BMI 26.55 kg/m²   Immunization History   Administered Date(s) Administered    COVID-19, MRNA, LN-S, PF (MODERNA FULL 0.5 ML DOSE) 11/19/2021    Influenza 10/07/2020    Influenza - Quadrivalent - MDCK - PF 10/07/2019    Pneumococcal Polysaccharide - 23 Valent 06/22/2020    Tdap 01/29/2007, 08/24/2018    Zoster Recombinant 10/07/2019, 01/11/2020              Physical Exam   Constitutional: She appears well-developed. No distress.   HENT:   Head: Normocephalic and atraumatic.   Pulmonary/Chest: Effort normal. No respiratory distress.   Musculoskeletal:         General: No swelling, tenderness or deformity. Normal range of motion.      Comments: Able to rise from a chair independently.  Walks without assistance.  Steady gait.  No kyphosis.     Neurological: She is alert.   Skin: Skin is warm. Capillary refill takes less than 2 seconds. No rash noted. She is not diaphoretic. No erythema.   Psychiatric: Her behavior is normal. Judgment and thought content normal.   Vitals reviewed.          Recent Results (from the past 672 hour(s))   Aerobic culture    Collection Time: 04/20/22  4:01 PM    Specimen: Abscess   Result " Value Ref Range    Aerobic Bacterial Culture Skin giovany,  no predominant organism    Comprehensive Metabolic Panel    Collection Time: 05/18/22  1:34 PM   Result Value Ref Range    Sodium 142 136 - 145 mmol/L    Potassium 4.3 3.5 - 5.1 mmol/L    Chloride 104 95 - 110 mmol/L    CO2 27 23 - 29 mmol/L    Glucose 89 70 - 110 mg/dL    BUN 23 (H) 6 - 20 mg/dL    Creatinine 0.7 0.5 - 1.4 mg/dL    Calcium 9.6 8.7 - 10.5 mg/dL    Total Protein 6.5 6.0 - 8.4 g/dL    Albumin 4.2 3.5 - 5.2 g/dL    Total Bilirubin 0.8 0.1 - 1.0 mg/dL    Alkaline Phosphatase 48 (L) 55 - 135 U/L    AST 18 10 - 40 U/L    ALT 15 10 - 44 U/L    Anion Gap 11 8 - 16 mmol/L    eGFR if African American >60 >60 mL/min/1.73 m^2    eGFR if non African American >60 >60 mL/min/1.73 m^2   Phosphorus    Collection Time: 05/18/22  1:34 PM   Result Value Ref Range    Phosphorus 4.3 2.7 - 4.5 mg/dL        No results found for: TBGOLDPLUS   No results found for: HAV, HEPAIGM, HEPBIGM, HEPBCAB, HBEAG, HEPCAB       DEXA 06/17/2019  L1-L4-2.5, L4-2.9, FN-2.2, TH-2.2,-16.5% significant decline in spine,-16.0% significant decline in hip.  Osteoporosis at spine with significant decline at spine and hip.  Assessment:       1. Counseling, unspecified    2. Age-related osteoporosis without current pathological fracture    3. Medication monitoring encounter          Impression:     Osteoporosis bone density scores 06/2019.  Started on Fosamax 70 mg weekly which she has been taking appropriately.  Also on vitamin-D and calcium supplements.  Unsure current dosage.  Risk factors include prior rib fractures, family history (father).  Currently tolerating Fosamax.  One recent fall with no resulting fractures.  Patient would like to evaluate efficacy of Fosamax.    Tolerating Fosamax well    Other specified counseling  Over 10 minutes spent regarding below topics:  - Nutrition and exercise counseling.  - Medication counseling provided.   Plan:            Discussed osteoporosis/low  bone density disease state in detail with patient.  Reviewed treatment options along with potential side effects of these treatments.  Dexa scan was reviewed with patient.     Continue Fosamax 70 mg once weekly.    Continue vitamin-D and calcium supplementation.  Will evaluate vitamin-D level and determine appropriate supplementation dosage.    Thank you for the consult!    Labs today:  CMP, phosphorus, vitamin-D, PTH  DEXA at earliest availability either [facility] or [facility]  Return to clinic after DEXA with me at [facility]      Zaida Rangel PA-C  [facility]  Rheumatology       50 minutes of total time spent on the encounter, which includes face to face time and non-face to face time preparing to see the patient (eg, review of tests), Obtaining and/or reviewing separately obtained history, Documenting clinical information in the electronic or other health record, Independently interpreting results (not separately reported) and communicating results to the patient/family/caregiver, or Care coordination (not separately reported).

## 2022-07-06 ENCOUNTER — OFFICE VISIT (OUTPATIENT)
Dept: RHEUMATOLOGY | Facility: CLINIC | Age: 57
End: 2022-07-06
Payer: COMMERCIAL

## 2022-07-06 ENCOUNTER — APPOINTMENT (OUTPATIENT)
Dept: RADIOLOGY | Facility: HOSPITAL | Age: 57
End: 2022-07-06
Payer: COMMERCIAL

## 2022-07-06 VITALS
HEART RATE: 74 BPM | WEIGHT: 168.63 LBS | SYSTOLIC BLOOD PRESSURE: 106 MMHG | HEIGHT: 66 IN | DIASTOLIC BLOOD PRESSURE: 73 MMHG | BODY MASS INDEX: 27.1 KG/M2

## 2022-07-06 DIAGNOSIS — M81.0 AGE-RELATED OSTEOPOROSIS WITHOUT CURRENT PATHOLOGICAL FRACTURE: Primary | ICD-10-CM

## 2022-07-06 DIAGNOSIS — Z51.81 MEDICATION MONITORING ENCOUNTER: ICD-10-CM

## 2022-07-06 DIAGNOSIS — M81.0 AGE-RELATED OSTEOPOROSIS WITHOUT CURRENT PATHOLOGICAL FRACTURE: ICD-10-CM

## 2022-07-06 DIAGNOSIS — Z71.9 COUNSELING, UNSPECIFIED: ICD-10-CM

## 2022-07-06 PROCEDURE — 99999 PR PBB SHADOW E&M-EST. PATIENT-LVL IV: ICD-10-PCS | Mod: PBBFAC,,,

## 2022-07-06 PROCEDURE — 77080 DXA BONE DENSITY AXIAL: CPT | Mod: TC

## 2022-07-06 PROCEDURE — 77080 DEXA BONE DENSITY SPINE HIP: ICD-10-PCS | Mod: 26,,, | Performed by: RADIOLOGY

## 2022-07-06 PROCEDURE — 77080 DXA BONE DENSITY AXIAL: CPT | Mod: 26,,, | Performed by: RADIOLOGY

## 2022-07-06 PROCEDURE — 99214 OFFICE O/P EST MOD 30 MIN: CPT | Mod: S$GLB,,,

## 2022-07-06 PROCEDURE — 99214 PR OFFICE/OUTPT VISIT, EST, LEVL IV, 30-39 MIN: ICD-10-PCS | Mod: S$GLB,,,

## 2022-07-06 PROCEDURE — 99999 PR PBB SHADOW E&M-EST. PATIENT-LVL IV: CPT | Mod: PBBFAC,,,

## 2022-07-06 RX ORDER — ALENDRONATE SODIUM 70 MG/1
70 TABLET ORAL
Qty: 12 TABLET | Refills: 3 | Status: SHIPPED | OUTPATIENT
Start: 2022-07-06 | End: 2023-03-21

## 2022-07-06 NOTE — PROGRESS NOTES
RHEUMATOLOGY OUTPATIENT CLINIC NOTE    07/06/2022    Subjective:       Patient ID: Linette Schmidt is a 57 y.o. female.    Chief Complaint: Osteoporosis    HPI     Linette Schmidt is a 57 y.o. pleasant female here for rheumatology follow up for osteoporosis.     One fall since last appointment but no resulting fracture.  She is here to discuss her bone density report and also for Fosamax refill.  She takes a combine calcium and vitamin-D supplement for a total of 45 mcg of vitamin-D daily (1800 IU).  Denies any planned upcoming invasive dental work.  Rheumatologic review of systems negative otherwise      Osteoporosis questionnaire  Current meds for osteopenia/ osteoporosis: Fosamax 6/2019-present  Prior meds for osteopenia/ osteoporosis: None prior to fosamax  Prev dexa scan:  06/2019 osteoporosis at spine with significant decline in BMD at spine and hip  Vit D supplement:  Yes. Unsure current dosage  Menopause:  2/2 hysterectomy  Hystrectomy: Early 40s   HRT: None  Smoker/tobacco: None  Etoh: One drink a week or less  Falls: None.   Activity level: Walk  Kidney problems : No   Prev fracture :  Broken ribs from coughing with pneumonia. Left pinky finger got caught in swing chain. Broken toe from trauma.   Steroids : No  Family history :  Father - on medication for years and had femur fracture while on medication.   PPI/gerd:  No  Other risk factors : None  Dental issues: No  Anticipated dental work :  No  H/o cancer/tx: No      One recent fall falling off of golf cart at work. No resulting fracture. Injured her left hand which bruised but has since improved.   Rheumatologic review of systems negative otherwise.         Past Medical History:   Diagnosis Date    Asthma     Osteoporosis      Past Surgical History:   Procedure Laterality Date    FINGER FRACTURE SURGERY Left     5th finger    HYSTERECTOMY      PARTIAL HYSTERECTOMY       Family History   Problem Relation Age of Onset     "Hypertension Mother     Hypertension Father     Hypertension Sister     Breast cancer Neg Hx     Colon cancer Neg Hx     Ovarian cancer Neg Hx      Social History     Socioeconomic History    Marital status:     Number of children: 2   Tobacco Use    Smoking status: Never Smoker    Smokeless tobacco: Never Used   Substance and Sexual Activity    Alcohol use: Yes     Comment: socially    Drug use: No    Sexual activity: Yes     Partners: Male     Birth control/protection: See Surgical Hx     Review of patient's allergies indicates:  No Known Allergies        Objective:   /73   Pulse 74   Ht 5' 6" (1.676 m)   Wt 76.5 kg (168 lb 10.4 oz)   BMI 27.22 kg/m²   Immunization History   Administered Date(s) Administered    COVID-19, MRNA, LN-S, PF (MODERNA FULL 0.5 ML DOSE) 04/16/2021, 05/17/2021, 11/19/2021    Influenza 10/07/2020    Influenza - Quadrivalent - MDCK - PF 10/07/2019    Pneumococcal Polysaccharide - 23 Valent 06/22/2020    Tdap 01/29/2007, 08/24/2018    Zoster Recombinant 10/07/2019, 01/11/2020              Physical Exam   Constitutional: She appears well-developed. No distress.   HENT:   Head: Normocephalic and atraumatic.   Pulmonary/Chest: Effort normal. No respiratory distress.   Musculoskeletal:         General: No swelling, tenderness or deformity. Normal range of motion.      Comments: Able to rise from a chair independently.  Walks without assistance.  Steady gait.  No kyphosis.     Neurological: She is alert.   Skin: Skin is warm. Capillary refill takes less than 2 seconds. No rash noted. She is not diaphoretic. No erythema.   Psychiatric: Her behavior is normal. Judgment and thought content normal.   Vitals reviewed.          No results found for this or any previous visit (from the past 672 hour(s)).     No results found for: TBGOLDPLUS   No results found for: HAV, HEPAIGM, HEPBIGM, HEPBCAB, HBEAG, HEPCAB       DEXA 06/17/2019  L1-L4-2.5, L4-2.9, FN-2.2, " TH-2.2,-16.5% significant decline in spine,-16.0% significant decline in hip.  Osteoporosis at spine with significant decline at spine and hip.    DEXA 07/06/2022  L1-L4 -1.9, L4 -2.4, FN-1.8, TH -1.9, major FRAX 24.7%, hip FRAX 1.6%    Assessment:       1. Age-related osteoporosis without current pathological fracture    2. Counseling, unspecified    3. Medication monitoring encounter          Impression:     Osteoporosis  Osteoporosis bone density scores 06/2019.  Started on Fosamax 70 mg weekly 2019 which she has been taking appropriately.  Also on vitamin-D and calcium supplements.  1800 IU of vitamin-D daily.  Vitamin-D  63 on most recent labs 05/2022.  Risk factors include prior rib fractures, family history (father).  Currently tolerating Fosamax.  One recent fall with no resulting fractures.  DEXA 07/06/2022 reviewed and discussed with patient.  Improvement in both the spine and the hips to osteopenic scores, high major FRAX.    Tolerating Fosamax well.    Other specified counseling  Over 10 minutes spent regarding below topics:  - Nutrition and exercise counseling.  - Medication counseling provided.   - encouraged importance of staying moving, weight-bearing activity as tolerated  Plan:            Reviewed treatment options along with potential side effects of these treatments.  Dexa scan was reviewed with patient.  Options include 2 more years of Fosamax therapy and then drug holiday versus drug holiday now.  Given improvement in scores, multiple risk factors, high FRAX, however recommend continuing of Fosamax therapy at this time she has no side effects.    Continue Fosamax 70 mg once weekly.  Prescription sent to pharmacy  At this time anticipate Fosamax for a total of 5 years (June 2019 -  June 2024) as long as no complications in the interim.    Continue vitamin-D and calcium supplementation.     Weight-bearing activity as tolerated, caution to avoid falls.  Weight-bearing activity and exercises  printed for patient.      Return to clinic in 1 year with CMP    Zaida Rangel PA-C  [facility] Rheumatology       30 minutes of total time spent on the encounter, which includes face to face time and non-face to face time preparing to see the patient (eg, review of tests), Obtaining and/or reviewing separately obtained history, Documenting clinical information in the electronic or other health record, Independently interpreting results (not separately reported) and communicating results to the patient/family/caregiver, or Care coordination (not separately reported).

## 2022-08-05 ENCOUNTER — OFFICE VISIT (OUTPATIENT)
Dept: OPHTHALMOLOGY | Facility: CLINIC | Age: 57
End: 2022-08-05
Payer: COMMERCIAL

## 2022-08-05 DIAGNOSIS — H52.4 MYOPIA WITH ASTIGMATISM AND PRESBYOPIA, BILATERAL: ICD-10-CM

## 2022-08-05 DIAGNOSIS — H52.13 MYOPIA WITH ASTIGMATISM AND PRESBYOPIA, BILATERAL: ICD-10-CM

## 2022-08-05 DIAGNOSIS — H52.203 MYOPIA WITH ASTIGMATISM AND PRESBYOPIA, BILATERAL: ICD-10-CM

## 2022-08-05 DIAGNOSIS — H53.8 BLURRED VISION, BILATERAL: Primary | ICD-10-CM

## 2022-08-05 PROCEDURE — 92015 PR REFRACTION: ICD-10-PCS | Mod: S$GLB,,, | Performed by: OPTOMETRIST

## 2022-08-05 PROCEDURE — 99999 PR PBB SHADOW E&M-EST. PATIENT-LVL III: ICD-10-PCS | Mod: PBBFAC,,, | Performed by: OPTOMETRIST

## 2022-08-05 PROCEDURE — 92015 DETERMINE REFRACTIVE STATE: CPT | Mod: S$GLB,,, | Performed by: OPTOMETRIST

## 2022-08-05 PROCEDURE — 92014 COMPRE OPH EXAM EST PT 1/>: CPT | Mod: S$GLB,,, | Performed by: OPTOMETRIST

## 2022-08-05 PROCEDURE — 92014 PR EYE EXAM, EST PATIENT,COMPREHESV: ICD-10-PCS | Mod: S$GLB,,, | Performed by: OPTOMETRIST

## 2022-08-05 PROCEDURE — 99999 PR PBB SHADOW E&M-EST. PATIENT-LVL III: CPT | Mod: PBBFAC,,, | Performed by: OPTOMETRIST

## 2022-08-05 NOTE — PROGRESS NOTES
HPI     Annual Exam     Comments: Pt reports for updated glasses rx. Denies any pain or   irritation. Va blurry.           Last edited by Prem Trinidad on 8/5/2022  8:02 AM. (History)            Assessment /Plan     For exam results, see Encounter Report.    Blurred vision, bilateral    Myopia with astigmatism and presbyopia, bilateral      Eyeglass Final Rx     Eyeglass Final Rx       Sphere Cylinder Axis Add    Right -2.75 +3.50 092 +2.50    Left -2.00 +2.50 095 +2.50    Expiration Date: 8/5/2023                RTC 1 yr for dilated eye exam or PRN if any problems.   Discussed above and answered questions.

## 2022-08-11 ENCOUNTER — OFFICE VISIT (OUTPATIENT)
Dept: OBSTETRICS AND GYNECOLOGY | Facility: CLINIC | Age: 57
End: 2022-08-11
Payer: COMMERCIAL

## 2022-08-11 VITALS
BODY MASS INDEX: 26.72 KG/M2 | SYSTOLIC BLOOD PRESSURE: 112 MMHG | HEIGHT: 66 IN | WEIGHT: 166.25 LBS | DIASTOLIC BLOOD PRESSURE: 72 MMHG

## 2022-08-11 DIAGNOSIS — L02.92 BOIL: Primary | ICD-10-CM

## 2022-08-11 PROCEDURE — 99999 PR PBB SHADOW E&M-EST. PATIENT-LVL III: CPT | Mod: PBBFAC,,, | Performed by: NURSE PRACTITIONER

## 2022-08-11 PROCEDURE — 99213 OFFICE O/P EST LOW 20 MIN: CPT | Mod: 25,S$GLB,, | Performed by: NURSE PRACTITIONER

## 2022-08-11 PROCEDURE — 99999 PR PBB SHADOW E&M-EST. PATIENT-LVL III: ICD-10-PCS | Mod: PBBFAC,,, | Performed by: NURSE PRACTITIONER

## 2022-08-11 PROCEDURE — 56405 INCISION AND DRAINAGE: ICD-10-PCS | Mod: S$GLB,,, | Performed by: NURSE PRACTITIONER

## 2022-08-11 PROCEDURE — 56405 I&D VULVA/PERINEAL ABSCESS: CPT | Mod: S$GLB,,, | Performed by: NURSE PRACTITIONER

## 2022-08-11 PROCEDURE — 99213 PR OFFICE/OUTPT VISIT, EST, LEVL III, 20-29 MIN: ICD-10-PCS | Mod: 25,S$GLB,, | Performed by: NURSE PRACTITIONER

## 2022-08-11 PROCEDURE — 87070 CULTURE OTHR SPECIMN AEROBIC: CPT | Performed by: NURSE PRACTITIONER

## 2022-08-11 RX ORDER — SULFAMETHOXAZOLE AND TRIMETHOPRIM 400; 80 MG/1; MG/1
1 TABLET ORAL 2 TIMES DAILY
Qty: 20 TABLET | Refills: 0 | Status: SHIPPED | OUTPATIENT
Start: 2022-08-11 | End: 2022-08-21

## 2022-08-11 RX ORDER — ELECTROLYTES/DEXTROSE
SOLUTION, ORAL ORAL
COMMUNITY
Start: 2020-09-01

## 2022-08-11 NOTE — PROGRESS NOTES
Subjective:       Patient ID: Linette Schmidt is a 57 y.o. female.    Chief Complaint:  vulvar cyst     No LMP recorded. Patient has had a hysterectomy.  History of Present Illness  Presents today for bump on vagina that began to become very tender and painful yesterday.     OB History    Para Term  AB Living   2 2 2         SAB IAB Ectopic Multiple Live Births                  # Outcome Date GA Lbr Dylan/2nd Weight Sex Delivery Anes PTL Lv   2 Term            1 Term                Review of Systems  Review of Systems   Genitourinary: Positive for genital sores.           Objective:    Physical Exam  Genitourinary:     Exam position: Supine.      Labia:         Right: Tenderness present.                Assessment:     1. Boil              Plan:   Linette was seen today for vulvar cyst .    Diagnoses and all orders for this visit:    Boil  -     sulfamethoxazole-trimethoprim 400-80mg (BACTRIM) 400-80 mg per tablet; Take 1 tablet by mouth 2 (two) times daily. for 10 days  -     Aerobic culture      Return to clinic in one week for f/u

## 2022-08-12 NOTE — PROCEDURES
INCISION AND DRAINAGE    Date/Time: 8/11/2022 3:30 PM  Performed by: Claribel Ferrer NP  Authorized by: Claribel Ferrer NP     Consent Done?:  Yes (Verbal)    Type:  Abscess  Body area:  Anogenital  Location details:  Vulva  Local anesthetic: lidocaine 2% with epinephrine  Scalpel size:  11  Incision type:  Single straight  Incision depth: subcutaneous    Complexity:  Simple  Drainage:  Pus and serosanguinous  Drainage amount:  Moderate  Wound treatment:  Wound left open  Packing material:  None  Patient tolerance:  Patient tolerated the procedure well with no immediate complications

## 2022-08-15 DIAGNOSIS — Z00.00 ROUTINE GENERAL MEDICAL EXAMINATION AT A HEALTH CARE FACILITY: Primary | ICD-10-CM

## 2022-08-15 LAB — BACTERIA SPEC AEROBE CULT: NORMAL

## 2022-09-29 ENCOUNTER — CLINICAL SUPPORT (OUTPATIENT)
Dept: REHABILITATION | Facility: HOSPITAL | Age: 57
End: 2022-09-29

## 2022-09-29 ENCOUNTER — CLINICAL SUPPORT (OUTPATIENT)
Dept: INTERNAL MEDICINE | Facility: CLINIC | Age: 57
End: 2022-09-29
Payer: COMMERCIAL

## 2022-09-29 ENCOUNTER — HOSPITAL ENCOUNTER (OUTPATIENT)
Dept: CARDIOLOGY | Facility: HOSPITAL | Age: 57
Discharge: HOME | End: 2022-09-29

## 2022-09-29 ENCOUNTER — OFFICE VISIT (OUTPATIENT)
Dept: INTERNAL MEDICINE | Facility: CLINIC | Age: 57
End: 2022-09-29
Payer: COMMERCIAL

## 2022-09-29 VITALS
TEMPERATURE: 98 F | WEIGHT: 163 LBS | BODY MASS INDEX: 26.2 KG/M2 | SYSTOLIC BLOOD PRESSURE: 108 MMHG | DIASTOLIC BLOOD PRESSURE: 70 MMHG | RESPIRATION RATE: 16 BRPM | HEART RATE: 68 BPM | HEIGHT: 66 IN

## 2022-09-29 DIAGNOSIS — J45.20 MILD INTERMITTENT ASTHMA WITHOUT COMPLICATION: ICD-10-CM

## 2022-09-29 DIAGNOSIS — I10 ESSENTIAL HYPERTENSION: ICD-10-CM

## 2022-09-29 DIAGNOSIS — Z00.00 ROUTINE GENERAL MEDICAL EXAMINATION AT A HEALTH CARE FACILITY: Primary | ICD-10-CM

## 2022-09-29 DIAGNOSIS — Z00.00 ROUTINE GENERAL MEDICAL EXAMINATION AT A HEALTH CARE FACILITY: ICD-10-CM

## 2022-09-29 DIAGNOSIS — M85.80 OSTEOPENIA, UNSPECIFIED LOCATION: ICD-10-CM

## 2022-09-29 DIAGNOSIS — E78.2 MIXED HYPERLIPIDEMIA: ICD-10-CM

## 2022-09-29 LAB
ALBUMIN SERPL BCP-MCNC: 4.2 G/DL (ref 3.5–5.2)
ALP SERPL-CCNC: 53 U/L (ref 55–135)
ALT SERPL W/O P-5'-P-CCNC: 15 U/L (ref 10–44)
ANION GAP SERPL CALC-SCNC: 11 MMOL/L (ref 8–16)
AST SERPL-CCNC: 18 U/L (ref 10–40)
BILIRUB SERPL-MCNC: 1.1 MG/DL (ref 0.1–1)
BUN SERPL-MCNC: 22 MG/DL (ref 6–20)
CALCIUM SERPL-MCNC: 9.6 MG/DL (ref 8.7–10.5)
CHLORIDE SERPL-SCNC: 106 MMOL/L (ref 95–110)
CHOLEST SERPL-MCNC: 176 MG/DL (ref 120–199)
CHOLEST/HDLC SERPL: 2.4 {RATIO} (ref 2–5)
CO2 SERPL-SCNC: 28 MMOL/L (ref 23–29)
CREAT SERPL-MCNC: 0.7 MG/DL (ref 0.5–1.4)
ERYTHROCYTE [DISTWIDTH] IN BLOOD BY AUTOMATED COUNT: 12 % (ref 11.5–14.5)
EST. GFR  (NO RACE VARIABLE): >60 ML/MIN/1.73 M^2
ESTIMATED AVG GLUCOSE: 94 MG/DL (ref 68–131)
GLUCOSE SERPL-MCNC: 88 MG/DL (ref 70–110)
HBA1C MFR BLD: 4.9 % (ref 4–5.6)
HCT VFR BLD AUTO: 39.5 % (ref 37–48.5)
HDLC SERPL-MCNC: 72 MG/DL (ref 40–75)
HDLC SERPL: 40.9 % (ref 20–50)
HGB BLD-MCNC: 13.2 G/DL (ref 12–16)
LDLC SERPL CALC-MCNC: 90 MG/DL (ref 63–159)
MCH RBC QN AUTO: 28.9 PG (ref 27–31)
MCHC RBC AUTO-ENTMCNC: 33.4 G/DL (ref 32–36)
MCV RBC AUTO: 86 FL (ref 82–98)
NONHDLC SERPL-MCNC: 104 MG/DL
PLATELET # BLD AUTO: 201 K/UL (ref 150–450)
PMV BLD AUTO: 12.1 FL (ref 9.2–12.9)
POTASSIUM SERPL-SCNC: 5.3 MMOL/L (ref 3.5–5.1)
PROT SERPL-MCNC: 6.6 G/DL (ref 6–8.4)
RBC # BLD AUTO: 4.57 M/UL (ref 4–5.4)
SODIUM SERPL-SCNC: 145 MMOL/L (ref 136–145)
TRIGL SERPL-MCNC: 70 MG/DL (ref 30–150)
TSH SERPL DL<=0.005 MIU/L-ACNC: 1.84 UIU/ML (ref 0.4–4)
WBC # BLD AUTO: 5.58 K/UL (ref 3.9–12.7)

## 2022-09-29 PROCEDURE — 97750 PHYSICAL PERFORMANCE TEST: CPT | Performed by: PHYSICAL THERAPIST

## 2022-09-29 PROCEDURE — 99396 PR PREVENTIVE VISIT,EST,40-64: ICD-10-PCS | Mod: 25,,, | Performed by: FAMILY MEDICINE

## 2022-09-29 PROCEDURE — 99211 OFF/OP EST MAY X REQ PHY/QHP: CPT | Mod: S$GLB,,, | Performed by: INTERNAL MEDICINE

## 2022-09-29 PROCEDURE — 90471 IMMUNIZATION ADMIN: CPT | Mod: ,,, | Performed by: FAMILY MEDICINE

## 2022-09-29 PROCEDURE — 93005 ELECTROCARDIOGRAM TRACING: CPT

## 2022-09-29 PROCEDURE — 90686 IIV4 VACC NO PRSV 0.5 ML IM: CPT | Mod: ,,, | Performed by: FAMILY MEDICINE

## 2022-09-29 PROCEDURE — 90471 FLU VACCINE (QUAD) GREATER THAN OR EQUAL TO 3YO PRESERVATIVE FREE IM: ICD-10-PCS | Mod: ,,, | Performed by: FAMILY MEDICINE

## 2022-09-29 PROCEDURE — 90686 FLU VACCINE (QUAD) GREATER THAN OR EQUAL TO 3YO PRESERVATIVE FREE IM: ICD-10-PCS | Mod: ,,, | Performed by: FAMILY MEDICINE

## 2022-09-29 PROCEDURE — 93010 EKG 12-LEAD: ICD-10-PCS | Mod: ,,, | Performed by: INTERNAL MEDICINE

## 2022-09-29 PROCEDURE — 83036 HEMOGLOBIN GLYCOSYLATED A1C: CPT | Performed by: FAMILY MEDICINE

## 2022-09-29 PROCEDURE — 99999 PR PBB SHADOW E&M-EST. PATIENT-LVL IV: CPT | Mod: PBBFAC,,, | Performed by: FAMILY MEDICINE

## 2022-09-29 PROCEDURE — 85027 COMPLETE CBC AUTOMATED: CPT | Performed by: FAMILY MEDICINE

## 2022-09-29 PROCEDURE — 97802 PR MED NUTR THER, 1ST, INDIV, EA 15 MIN: ICD-10-PCS | Mod: S$GLB,,, | Performed by: INTERNAL MEDICINE

## 2022-09-29 PROCEDURE — 99211 PR NURSE VISIT, 15 MINS, EXEC HLTH ONLY: ICD-10-PCS | Mod: S$GLB,,, | Performed by: INTERNAL MEDICINE

## 2022-09-29 PROCEDURE — 84443 ASSAY THYROID STIM HORMONE: CPT | Performed by: FAMILY MEDICINE

## 2022-09-29 PROCEDURE — 99999 PR PBB SHADOW E&M-EST. PATIENT-LVL IV: ICD-10-PCS | Mod: PBBFAC,,, | Performed by: FAMILY MEDICINE

## 2022-09-29 PROCEDURE — 80053 COMPREHEN METABOLIC PANEL: CPT | Performed by: FAMILY MEDICINE

## 2022-09-29 PROCEDURE — 99396 PREV VISIT EST AGE 40-64: CPT | Mod: 25,,, | Performed by: FAMILY MEDICINE

## 2022-09-29 PROCEDURE — 93010 ELECTROCARDIOGRAM REPORT: CPT | Mod: ,,, | Performed by: INTERNAL MEDICINE

## 2022-09-29 PROCEDURE — 80061 LIPID PANEL: CPT | Performed by: FAMILY MEDICINE

## 2022-09-29 PROCEDURE — 97802 MEDICAL NUTRITION INDIV IN: CPT | Mod: S$GLB,,, | Performed by: INTERNAL MEDICINE

## 2022-09-29 NOTE — PROGRESS NOTES
"Nutrition Assessment  Session Time:  36 minutes      Client name:  Linette Schmidt  :  1965  Age:  57 y.o.  Gender:  female    Client states:  present for annual [facility] physical. Last physical and nutrition consultation was in 2021.    Continues to seek guidance on weight loss. Was doing well for a while, but gotten off track and gained weight.  Following Optivia diet again which consists of 800 calories daily, but finds self consuming up to 1400 calories on some of the days.  Wants to find a new way to approach weight loss.    Recently retired so should have more time for exercise now. Now is working part time + volunteering.       Eats Optivia 100 calorie products every 3 hours.   Evening time boredom results in a few extra servings of egg whites and cheese being consumed.         2021: Client states:  Shell spouse present for annual physical with [facility]. Last seen 2020.   Seeking weight loss to reach 135 lbs. Was doing meal replacement previously, Optivia (800 calories per day).   Now 9849-8805 calories.   No exercise. Work 6:30am-7pm on week days. Stays active on weekends with hiking and other activities.  Reports being unable to lose weight unless she is very strict with her calories.          Anthropometrics  Height:  66"     Weight:  165.2 lbs   2021: 150.5 lbs (self reported)   2020: 145 lbs  BMI:  26.7   2021: 24.3  % Body Fat:  not available   2021: 29.12%    Clinical Signs/Symptoms  N/V/D:  none reports; reports having a bowel movement once every 3-4 days which has always been her normal routine  Appetite:  good       Past Medical History:   Diagnosis Date    Asthma     Osteoporosis        Past Surgical History:   Procedure Laterality Date    FINGER FRACTURE SURGERY Left     5th finger    HYSTERECTOMY      PARTIAL HYSTERECTOMY         Medications    has a current medication list which includes the following prescription(s): alendronate, " ascorbic acid (vitamin c), aspirin, ca-d3-mag ox-zinc--rodney-bor, estradiol, hydrochlorothiazide, mv-mn/iron/folic acid/herb 190, pravastatin, proair hfa, and ramipril.    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    Social History     Tobacco Use    Smoking status: Never    Smokeless tobacco: Never   Substance Use Topics    Alcohol use: Yes     Comment: socially        Lab Reports   Sodium   Date Value Ref Range Status   05/18/2022 142 136 - 145 mmol/L Final     Potassium   Date Value Ref Range Status   05/18/2022 4.3 3.5 - 5.1 mmol/L Final     Chloride   Date Value Ref Range Status   05/18/2022 104 95 - 110 mmol/L Final     CO2   Date Value Ref Range Status   05/18/2022 27 23 - 29 mmol/L Final     Glucose   Date Value Ref Range Status   05/18/2022 89 70 - 110 mg/dL Final     BUN   Date Value Ref Range Status   05/18/2022 23 (H) 6 - 20 mg/dL Final     Creatinine   Date Value Ref Range Status   05/18/2022 0.7 0.5 - 1.4 mg/dL Final     Calcium   Date Value Ref Range Status   05/18/2022 9.6 8.7 - 10.5 mg/dL Final     Total Protein   Date Value Ref Range Status   05/18/2022 6.5 6.0 - 8.4 g/dL Final     Albumin   Date Value Ref Range Status   05/18/2022 4.2 3.5 - 5.2 g/dL Final     Total Bilirubin   Date Value Ref Range Status   05/18/2022 0.8 0.1 - 1.0 mg/dL Final     Comment:     For infants and newborns, interpretation of results should be based  on gestational age, weight and in agreement with clinical  observations.    Premature Infant recommended reference ranges:  Up to 24 hours.............<8.0 mg/dL  Up to 48 hours............<12.0 mg/dL  3-5 days..................<15.0 mg/dL  6-29 days.................<15.0 mg/dL       Alkaline Phosphatase   Date Value Ref Range Status   05/18/2022 48 (L) 55 - 135 U/L Final      AST   Date Value Ref Range Status   05/18/2022 18 10 - 40 U/L Final     ALT   Date Value Ref Range Status   05/18/2022 15 10 - 44 U/L Final     Anion Gap   Date Value Ref Range Status   05/18/2022 11 8 - 16 mmol/L Final     eGFR if    Date Value Ref Range Status   05/18/2022 >60 >60 mL/min/1.73 m^2 Final     eGFR if non    Date Value Ref Range Status   05/18/2022 >60 >60 mL/min/1.73 m^2 Final     Comment:     Calculation used to obtain the estimated glomerular filtration  rate (eGFR) is the CKD-EPI equation.         Lab Results   Component Value Date    WBC 4.68 07/22/2021    HGB 14.0 07/22/2021    HCT 39.9 07/22/2021    MCV 85 07/22/2021     07/22/2021        Lab Results   Component Value Date    CHOL 141 07/22/2021     Lab Results   Component Value Date    HDL 58 07/22/2021     Lab Results   Component Value Date    LDLCALC 68.6 07/22/2021     Lab Results   Component Value Date    TRIG 72 07/22/2021     Lab Results   Component Value Date    CHOLHDL 41.1 07/22/2021     Lab Results   Component Value Date    HGBA1C 4.6 07/22/2021     BP Readings from Last 1 Encounters:   08/11/22 112/72       Food History  No recall provided    Exercise History:  very little// no consistent routine// enjoys hiking    Cultural/Spiritual/Personal Preferences:  None identified    Support System:  spouse    State of Change:  Contemplation    Barriers to Change:  none    Diagnosis    Food and nutrition related knowledge deficit related to seeking guidance on weight loss as evidence by patient's statement.    Intervention    RMR (Method:  [place] Jeor):  1366 kcal  Activity Factor:  1.3    NICOLAS:  1775 - 500 = 1275 kcal    Goals:  1.  9896-3337 calories daily  2.  Breakfast and Snack: complex carbohydrate + protein  3.  Lunch and Dinner: non-starchy vegetables + protein + complex carbohydrates  4. Aim for 80 grams protein daily    Nutrition Education  The following education was provided to the  patient:  Discussed meal planning/USDA's My Plate design.  Discussed weight management.  Suggested dietary modifications based on current dietary behaviors and individual food preferences.  *Lab results were pending at time of consult and so, not discussed with patient.    Patient verbalized understanding of nutrition education and recommendations received.    Handouts Provided  Meal Planning Guide  Restaurant Guide  Eat Fit Shopping List  Fueling Well On-The-Go  Plate Method  Healthy Snack List  Protein content of foods    Monitoring/Evaluation    Monitor the following:  Weight  BMI  Caloric intake  Labs:  lipid panel, glucose, A1c    Follow Up Plan:  Communication with referring healthcare provider is unnecessary at this time as patient presented as part of annual wellness exam.  However, will follow up with patient in 1-2 years.

## 2022-09-29 NOTE — PROGRESS NOTES
:  69    DX: Z00.0  Orders:  Fitness Evaluation    Patient's 5th [facility] Fitness Component evaluation was completed.  Results are as follows:    Height (in):    66                            Weight (lbs):    163                                    BMI:   26.4    Resting Energy Expenditure:         1356       kcal/24 hrs.                           Body Composition:          33.28  % body fat             Rating: Good    Waist to Hip Ratio:     0.77                    Risk:  Low   Hip taken over clothing:      Yes          Muscular Strength and Endurance Assessment:               Strength (lbs):     Right: 64             Rating:  average      Left:  60.3           Rating: slightly above average   Push-ups:   13                 Rating:  very good   Curl-ups :   48                Rating:  well above average    Flexibility Testing:   Sit and Reach (cm):    32.5                       Rating:  very good    Pt now only working 2 days/week so work is much less stressful! Weight has increased since last year and she is working to take that off.  States received good information from nutrition today.  Does not exercise on a regular basis. Occasional hiking.  Wants to try Yoga and encouraged her to do so.  She plans to look for facility that offers beginners classes, has tried video.      Date 2022   Height (in): 66 66 66 66 67   Weight: 163 149 145 147 206   BMI: 26.36 24.10 23.45 23.78 32.33   Body Fat %: 33.28 29.12 28.07 27.13 36.45   Waist (cm): 82.6 77.9 77 78.4 98.3   Hip (cm): 106.9 101.6 98.9 98.3 116.6   WHR: 0.77 0.77 0.78 0.80 0.84   RBP: 108/70 101/68 122/78 150/82 128/82   RHR: 68 73 60 67 80    Rt (lbs): 64 63 62 64 61    Lt (lbs): 60 61 57 56 59   Push-up  13 11 12 13 14   Curl-up  48 48 48 48 48   Flexibility  33 29 28 30 25   REE  (Kcals) 1356 1330 1362 1440 1590       The patient completed the testing procedures without complications.

## 2022-09-29 NOTE — PROGRESS NOTES
"Subjective:       Patient ID: Linette Schmidt is a 57 y.o. female.    Chief Complaint: [facility]    57-year-old female patient with Patient Active Problem List:     Mild intermittent asthma without complication     Mixed hyperlipidemia     Osteopenia     Essential hypertension     Lichen sclerosus     Dyspareunia, female     Vulvar cyst  Here for [facility] physical.  Patient has been taking her blood pressure medications regularly and denies any chest pain or difficulty breathing with palpitations, reports that she had colonoscopy done last year at [facility] which was normal.  Patient occasionally has been having discomfort with swallowing liquids.       Review of Systems   Constitutional:  Negative for fatigue.   HENT:  Positive for trouble swallowing.    Eyes:  Negative for visual disturbance.   Respiratory:  Negative for shortness of breath.    Cardiovascular:  Negative for chest pain and leg swelling.   Gastrointestinal:  Negative for abdominal pain, nausea and vomiting.   Musculoskeletal:  Negative for myalgias.   Skin:  Negative for rash.   Neurological:  Negative for light-headedness and headaches.   Psychiatric/Behavioral:  Negative for sleep disturbance.        /70 (BP Location: Left arm, Patient Position: Sitting, BP Method: Large (Automatic))   Pulse 68   Temp 97.8 °F (36.6 °C) (Tympanic)   Resp 16   Ht 5' 6" (1.676 m)   Wt 73.9 kg (163 lb)   BMI 26.31 kg/m²   Objective:      Physical Exam  Constitutional:       Appearance: She is well-developed.   HENT:      Head: Normocephalic and atraumatic.   Cardiovascular:      Rate and Rhythm: Normal rate and regular rhythm.      Heart sounds: Normal heart sounds. No murmur heard.  Pulmonary:      Effort: Pulmonary effort is normal.      Breath sounds: Normal breath sounds. No wheezing.   Abdominal:      General: Bowel sounds are normal.      Palpations: Abdomen is soft.      Tenderness: There is no abdominal tenderness.   Skin:   "   General: Skin is warm and dry.      Findings: No rash.   Neurological:      Mental Status: She is alert and oriented to person, place, and time.   Psychiatric:         Mood and Affect: Mood normal.         Assessment/Plan:   1. Routine general medical examination at a health care facility  Vital signs stable today.  Clinical exam stable  Continue lifestyle modifications with low-fat and low-cholesterol diet and exercise 30 minutes daily   Flu shot given today    2. Essential hypertension  Blood pressure is stable currently on hydrochlorothiazide 12.5 mg and ramipril 10 mg daily  Advised to discuss further with her cardiologist Dr. Armendariz to see if she can get off on hydrochlorothiazide    3. Mixed hyperlipidemia  Stable on pravastatin 80 mg  Encouraged to work on lifestyle modifications with diet and exercise    4. Mild intermittent asthma without complication  Clinically doing well    5. Osteopenia, unspecified location   Noted improvement with DEXA scan from osteoporosis to osteopenia, currently taking the Fosamax weekly and calcium with vitamin-D supplements

## 2023-03-21 ENCOUNTER — OFFICE VISIT (OUTPATIENT)
Dept: INTERNAL MEDICINE | Facility: CLINIC | Age: 58
End: 2023-03-21
Payer: COMMERCIAL

## 2023-03-21 VITALS
SYSTOLIC BLOOD PRESSURE: 118 MMHG | OXYGEN SATURATION: 100 % | WEIGHT: 165 LBS | DIASTOLIC BLOOD PRESSURE: 74 MMHG | HEART RATE: 86 BPM | HEIGHT: 66 IN | BODY MASS INDEX: 26.52 KG/M2 | TEMPERATURE: 98 F

## 2023-03-21 DIAGNOSIS — J45.20 MILD INTERMITTENT ASTHMA WITHOUT COMPLICATION: Chronic | ICD-10-CM

## 2023-03-21 DIAGNOSIS — I10 ESSENTIAL HYPERTENSION: Chronic | ICD-10-CM

## 2023-03-21 DIAGNOSIS — M54.50 ACUTE MIDLINE LOW BACK PAIN WITHOUT SCIATICA: Primary | ICD-10-CM

## 2023-03-21 DIAGNOSIS — M81.0 AGE-RELATED OSTEOPOROSIS WITHOUT CURRENT PATHOLOGICAL FRACTURE: ICD-10-CM

## 2023-03-21 DIAGNOSIS — Z12.31 BREAST CANCER SCREENING BY MAMMOGRAM: ICD-10-CM

## 2023-03-21 DIAGNOSIS — E78.2 MIXED HYPERLIPIDEMIA: Chronic | ICD-10-CM

## 2023-03-21 PROBLEM — M85.80 OSTEOPENIA: Chronic | Status: ACTIVE | Noted: 2019-06-17

## 2023-03-21 PROBLEM — M85.80 OSTEOPENIA: Chronic | Status: RESOLVED | Noted: 2019-06-17 | Resolved: 2023-03-21

## 2023-03-21 PROCEDURE — 99999 PR PBB SHADOW E&M-EST. PATIENT-LVL V: CPT | Mod: PBBFAC,,, | Performed by: FAMILY MEDICINE

## 2023-03-21 PROCEDURE — 99214 PR OFFICE/OUTPT VISIT, EST, LEVL IV, 30-39 MIN: ICD-10-PCS | Mod: S$GLB,,, | Performed by: FAMILY MEDICINE

## 2023-03-21 PROCEDURE — 99999 PR PBB SHADOW E&M-EST. PATIENT-LVL V: ICD-10-PCS | Mod: PBBFAC,,, | Performed by: FAMILY MEDICINE

## 2023-03-21 PROCEDURE — 99214 OFFICE O/P EST MOD 30 MIN: CPT | Mod: S$GLB,,, | Performed by: FAMILY MEDICINE

## 2023-03-21 RX ORDER — ALENDRONATE SODIUM 70 MG/1
70 TABLET ORAL
Qty: 12 TABLET | Refills: 0 | Status: SHIPPED | OUTPATIENT
Start: 2023-03-21 | End: 2023-07-03 | Stop reason: SDUPTHER

## 2023-03-21 NOTE — PROGRESS NOTES
OFFICE VISIT 3/21/23  3:30 PM CDT    Subjective   CHIEF COMPLAINT: Low-back Pain and Establish Care    This is my first time treating Linette.  Linette requested that I assume the role of their primary care provider.    She reports that one week ago she developed midline lumbar back pain. She says that the pain waxed and waned. She says that on a day that she worked for prolonged periods on her feet, the back pain worsened. She said one day she rested in her recliner for a while, and the back pain got better. She says the back pain was worst on Sunday, and then she woke up Monday morning without any back pain. However, the back pain recurred, she scheduled this appointment, and she says that two hours later the back pain resolved and has not recurred since. She reports no recent relevant injury. We discussed differential diagnosis and risks and benefits of treatment options. It was agreed to proceed with conservative therapy with self-directed rehab (core-muscle strengthening) at home. She is to let me know if her symptoms recur/worsen.    Back Pain  This is a new problem. The current episode started in the past 7 days. The problem occurs rarely. The problem has been rapidly worsening since onset. The pain is present in the lumbar spine and sacro-iliac. The quality of the pain is described as aching and stabbing. The pain does not radiate. The pain is at a severity of 6/10. The pain is moderate. The pain is The same all the time. The symptoms are aggravated by lying down and standing. Stiffness is present All day. Pertinent negatives include no abdominal pain, bladder incontinence, bowel incontinence, chest pain, dysuria, fever, headaches, leg pain, numbness, paresis, paresthesias, perianal numbness, tingling, weakness or weight loss. Risk factors include history of osteoporosis, lack of exercise and menopause. The treatment provided no relief.   Review of Systems   Constitutional:  Negative for diaphoresis, fever  and weight loss.   Cardiovascular:  Negative for chest pain.   Gastrointestinal:  Negative for abdominal pain, bowel incontinence, change in bowel habit, fecal incontinence and change in bowel habit.   Genitourinary:  Negative for bladder incontinence, difficulty urinating, dysuria and hematuria.   Musculoskeletal:  Positive for back pain. Negative for leg pain.   Neurological:  Negative for tingling, weakness, numbness, headaches and paresthesias.        No saddle anesthesia or loss of lower extremity strength or sensation.      Assessment & Plan   1. Acute midline low back pain without sciatica    2. Breast cancer screening by mammogram  -     Mammo Digital Screening Bilat w/ Ric; Future; Expected date: 04/06/2023    3. Age-related osteoporosis, improved to osteopenia with alendronate  Assessment & Plan:  Linette reports she is doing well on her alendronate. She reports preceiving no adverse side-effects and wants to continue current treatment.     Orders:  -     alendronate (FOSAMAX) 70 MG tablet; Take 1 tablet (70 mg total) by mouth every 7 days.  Dispense: 12 tablet; Refill: 0    4. Mixed hyperlipidemia  Assessment & Plan:  This is a chronic problem, stable.  Lab Results   Component Value Date    CHOL 176 09/29/2022    CHOL 141 07/22/2021    TRIG 70 09/29/2022    TRIG 72 07/22/2021    HDL 72 09/29/2022    HDL 58 07/22/2021    LDLCALC 90.0 09/29/2022    LDLCALC 68.6 07/22/2021    NONHDLCHOL 104 09/29/2022    NONHDLCHOL 83 07/22/2021    AST 18 09/29/2022    ALT 15 09/29/2022   She appears to be tolerating pravastatin for dyslipidemia without apparent symptoms of myositis.       5. Essential hypertension  Assessment & Plan:  This is a chronic problem that appears well-controlled and stable on ramipril.   BP Readings from Last 6 Encounters:   03/21/23 118/74   09/29/22 108/70   08/11/22 112/72   07/06/22 106/73   05/18/22 118/80   05/17/22 112/80     Lab Results   Component Value Date    EGFRNORACEVR >60  "09/29/2022    CREATININE 0.7 09/29/2022    BUN 22 (H) 09/29/2022    K 5.3 (H) 09/29/2022     09/29/2022     09/29/2022     Results for orders placed or performed during the hospital encounter of 09/29/22   EKG 12-lead    Collection Time: 09/29/22 10:16 AM    Narrative    Test Reason : Z00.00,    Vent. Rate : 072 BPM     Atrial Rate : 072 BPM     P-R Int : 114 ms          QRS Dur : 084 ms      QT Int : 414 ms       P-R-T Axes : 070 072 060 degrees     QTc Int : 453 ms    Normal sinus rhythm  Normal ECG  When compared with ECG of 22-JUN-2020 07:52,  No significant change was found  Confirmed by TRACY GOEL MD ([id number]) on 9/30/2022 7:27:07 AM    Referred By: SEFERINO HAMMONDS           Confirmed By:TRACY GOEL MD          6. Mild intermittent asthma without complication  Assessment & Plan:  Well controlled, especially since her intentional weight loss. Rarely needs albuterol.      Unless noted herein, any chronic conditions are represented as and appear stable, and no other significant complaints or concerns were reported.    Follow up if symptoms worsen or fail to improve, for annual wellness exam.   Future Appointments   Date Time Provider Department Center   5/23/2023  8:20 AM [facility]2 [facility]   8/22/2023  8:15 AM Jania Bar MD ON O[facility]  [facility]     Objective   Vitals:    03/21/23 1553   BP: 118/74   BP Location: Right arm   Patient Position: Sitting   BP Method: Small (Manual)   Pulse: 86   Temp: 98 °F (36.7 °C)   TempSrc: Oral   SpO2: 100%   Weight: 74.8 kg (165 lb)   Height: 5' 6" (1.676 m)   Physical Exam  Vitals reviewed.   Constitutional:       General: She is not in acute distress.     Appearance: Normal appearance. She is not ill-appearing.   Cardiovascular:      Rate and Rhythm: Normal rate and regular rhythm.   Pulmonary:      Effort: Pulmonary effort is normal.   Musculoskeletal:      Cervical back: No rigidity.      Thoracic back: Normal.      Lumbar back: Spasms " "and tenderness (mild, diffuse) present. No swelling, edema, deformity or bony tenderness. Normal range of motion. Negative right straight leg raise test and negative left straight leg raise test. No scoliosis.   Neurological:      General: No focal deficit present.      Mental Status: She is alert.      Sensory: No sensory deficit.      Motor: No weakness.      Coordination: Coordination normal.      Gait: Gait normal.      Deep Tendon Reflexes: Reflexes normal.   Psychiatric:         Mood and Affect: Mood normal.         Behavior: Behavior normal.         Thought Content: Thought content normal.         Judgment: Judgment normal.     Documentation entered by me for this encounter may have been done in part using speech-recognition technology. Although I have made an effort to ensure accuracy, "sound like" errors may exist and should be interpreted in context.  "

## 2023-03-21 NOTE — ASSESSMENT & PLAN NOTE
Linette reports she is doing well on her alendronate. She reports preceiving no adverse side-effects and wants to continue current treatment.

## 2023-03-21 NOTE — ASSESSMENT & PLAN NOTE
This is a chronic problem, stable.  Lab Results   Component Value Date    CHOL 176 09/29/2022    CHOL 141 07/22/2021    TRIG 70 09/29/2022    TRIG 72 07/22/2021    HDL 72 09/29/2022    HDL 58 07/22/2021    LDLCALC 90.0 09/29/2022    LDLCALC 68.6 07/22/2021    NONHDLCHOL 104 09/29/2022    NONHDLCHOL 83 07/22/2021    AST 18 09/29/2022    ALT 15 09/29/2022   She appears to be tolerating pravastatin for dyslipidemia without apparent symptoms of myositis.

## 2023-03-21 NOTE — ASSESSMENT & PLAN NOTE
This is a chronic problem that appears well-controlled and stable on ramipril.   BP Readings from Last 6 Encounters:   03/21/23 118/74   09/29/22 108/70   08/11/22 112/72   07/06/22 106/73   05/18/22 118/80   05/17/22 112/80     Lab Results   Component Value Date    EGFRNORACEVR >60 09/29/2022    CREATININE 0.7 09/29/2022    BUN 22 (H) 09/29/2022    K 5.3 (H) 09/29/2022     09/29/2022     09/29/2022     Results for orders placed or performed during the hospital encounter of 09/29/22   EKG 12-lead    Collection Time: 09/29/22 10:16 AM    Narrative    Test Reason : Z00.00,    Vent. Rate : 072 BPM     Atrial Rate : 072 BPM     P-R Int : 114 ms          QRS Dur : 084 ms      QT Int : 414 ms       P-R-T Axes : 070 072 060 degrees     QTc Int : 453 ms    Normal sinus rhythm  Normal ECG  When compared with ECG of 22-JUN-2020 07:52,  No significant change was found  Confirmed by TRACY GOEL MD ([id number]) on 9/30/2022 7:27:07 AM    Referred By: SEFERINO HAMMONDS           Confirmed By:TRACY GOEL MD

## 2023-03-21 NOTE — Clinical Note
Hi, Shoney.  Linette has chosen me as her PCP.  Would you please reach out to her to schedule her [facility] appointment at her convenience?  Also, if not part of [facility], would you please also order Hepatitis C antibody [[id number]] by written order guidelines?  Thank you for your help caring for our patients!  -LM

## 2023-03-21 NOTE — LETTER
ATTN: CARE COORDINATION   PATIENT IDENTIFYING INFORMATION:  LINETTE LAMBERT   [address]  [place] [address] YOB: 1965  OUR MRN: [id number]   Home Phone [phone]   Work Phone Not on file.   Mobile [phone]        RECORDS REQUESTED FROM:   [facility] [place]  [address] [place] [address]     SPECIFIC RECORDS REQUESTED:  colonoscopy reports (including any related pathology reports) and any other form of colorectal cancer screening test results within the last 10 years    DATES OF SERVICE REQUESTED: 5 years prior to date signed through the present date (unless specified differently above)    AUTHORIZATION:  For the purpose of continuity of care, I, Linette Lambert, hereby authorize the hospital, physician, or entity named above to release my medical records for the dates of service specified above to: CORI Epps MD; [facility]; [address]; [place] [address]; PH: [phone]    REQUESTED METHOD OF DELIVERY: Fax ([phone]) or secure Email ([email or web address])    In authorizing the release of the confidential information identified above, I hereby waive all restrictions or privileges imposed by law and release [facility] and Its affiliates and their staff from any restriction or privilege Imposed by law in connection with the disclosure or release of any professional record, observation or communication. I do understand that the information that is being released may be subject to re-disclosure by the recipient and may no longer be protected. I understand that my treatment, payment, enrollment or eligibility for benefits may not be conditioned on signing this authorization.  This authorization may be revoked in writing at any time, except to the extent that [facility] and its affiliates have already taken action in reliance on it. Letters to revoke  this authorization should be addressed to [facility], Release of Information Department, [address] [place] [id number].     If not previously revoked in writing, this authorization will terminate or  36 months after the date signed below.                   Signature of Patient    Date Signed

## 2023-04-02 ENCOUNTER — PATIENT MESSAGE (OUTPATIENT)
Dept: ADMINISTRATIVE | Facility: HOSPITAL | Age: 58
End: 2023-04-02
Payer: COMMERCIAL

## 2023-04-19 ENCOUNTER — PATIENT MESSAGE (OUTPATIENT)
Dept: ADMINISTRATIVE | Facility: HOSPITAL | Age: 58
End: 2023-04-19
Payer: COMMERCIAL

## 2023-04-27 ENCOUNTER — PATIENT OUTREACH (OUTPATIENT)
Dept: ADMINISTRATIVE | Facility: HOSPITAL | Age: 58
End: 2023-04-27
Payer: COMMERCIAL

## 2023-05-23 ENCOUNTER — HOSPITAL ENCOUNTER (OUTPATIENT)
Dept: RADIOLOGY | Facility: HOSPITAL | Age: 58
Discharge: HOME | End: 2023-05-23
Attending: FAMILY MEDICINE
Payer: COMMERCIAL

## 2023-05-23 DIAGNOSIS — Z12.31 BREAST CANCER SCREENING BY MAMMOGRAM: ICD-10-CM

## 2023-05-23 PROCEDURE — 77067 MAMMO DIGITAL SCREENING BILAT WITH TOMO: ICD-10-PCS | Mod: 26,,, | Performed by: RADIOLOGY

## 2023-05-23 PROCEDURE — 77067 SCR MAMMO BI INCL CAD: CPT | Mod: 26,,, | Performed by: RADIOLOGY

## 2023-05-23 PROCEDURE — 77063 BREAST TOMOSYNTHESIS BI: CPT | Mod: 26,,, | Performed by: RADIOLOGY

## 2023-05-23 PROCEDURE — 77063 MAMMO DIGITAL SCREENING BILAT WITH TOMO: ICD-10-PCS | Mod: 26,,, | Performed by: RADIOLOGY

## 2023-05-23 PROCEDURE — 77067 SCR MAMMO BI INCL CAD: CPT | Mod: TC

## 2023-05-29 ENCOUNTER — OFFICE VISIT (OUTPATIENT)
Dept: OTOLARYNGOLOGY | Facility: CLINIC | Age: 58
End: 2023-05-29
Payer: COMMERCIAL

## 2023-05-29 VITALS — BODY MASS INDEX: 27.7 KG/M2 | HEIGHT: 66 IN | WEIGHT: 172.38 LBS | TEMPERATURE: 97 F

## 2023-05-29 DIAGNOSIS — H61.21 IMPACTED CERUMEN OF RIGHT EAR: Primary | ICD-10-CM

## 2023-05-29 PROCEDURE — 99499 UNLISTED E&M SERVICE: CPT | Mod: S$GLB,,, | Performed by: PHYSICIAN ASSISTANT

## 2023-05-29 PROCEDURE — 99499 NO LOS: ICD-10-PCS | Mod: S$GLB,,, | Performed by: PHYSICIAN ASSISTANT

## 2023-05-29 PROCEDURE — 99999 PR PBB SHADOW E&M-EST. PATIENT-LVL III: ICD-10-PCS | Mod: PBBFAC,,, | Performed by: PHYSICIAN ASSISTANT

## 2023-05-29 PROCEDURE — 69210 PR REMOVAL IMPACTED CERUMEN REQUIRING INSTRUMENTATION, UNILATERAL: ICD-10-PCS | Mod: S$GLB,,, | Performed by: PHYSICIAN ASSISTANT

## 2023-05-29 PROCEDURE — 99999 PR PBB SHADOW E&M-EST. PATIENT-LVL III: CPT | Mod: PBBFAC,,, | Performed by: PHYSICIAN ASSISTANT

## 2023-05-29 PROCEDURE — 69210 REMOVE IMPACTED EAR WAX UNI: CPT | Mod: S$GLB,,, | Performed by: PHYSICIAN ASSISTANT

## 2023-05-29 NOTE — PROGRESS NOTES
"Subjective:   Patient ID: Linette Schmidt is a 58 y.o. female.    Chief Complaint: Cerumen Impaction (Ear cleaning. Patient starting to get dizzy and ringing in the R ear.)    Patient is here to see me today for evaluation of a possible wax impaction in right ear.  She has complaints of hearing loss in the affected ears, but denies pain or drainage.  This has been an issue in the past.  The patient has not been using any sort of ear drop to soften the wax.     Review of patient's allergies indicates:  No Known Allergies        Review of Systems   Constitutional:  Negative for chills, fatigue, fever and unexpected weight change.   HENT:  Positive for ear discharge, mouth sores and tinnitus. Negative for congestion, dental problem, ear pain, facial swelling, hearing loss, nosebleeds, postnasal drip, rhinorrhea, sinus pressure, sneezing, sore throat, trouble swallowing and voice change.    Eyes: Negative.  Negative for redness, itching and visual disturbance.   Respiratory: Negative.  Negative for cough, choking, shortness of breath and wheezing.    Cardiovascular:  Negative for chest pain and palpitations.   Gastrointestinal: Negative.  Negative for abdominal pain.        No reflux.   Endocrine: Negative.    Genitourinary: Negative.    Musculoskeletal:  Positive for back pain. Negative for gait problem.   Skin: Negative.  Negative for rash.   Allergic/Immunologic: Negative.    Neurological:  Positive for dizziness. Negative for light-headedness and headaches.   Hematological: Negative.    Psychiatric/Behavioral: Negative.         Objective:   Temp 97.2 °F (36.2 °C) (Temporal)   Ht 5' 6" (1.676 m)   Wt 78.2 kg (172 lb 6.4 oz)   BMI 27.83 kg/m²     Physical Exam  Constitutional:       General: She is not in acute distress.     Appearance: She is well-developed.   HENT:      Head: Normocephalic and atraumatic.      Right Ear: Tympanic membrane, ear canal and external ear normal. There is impacted cerumen.      " Left Ear: Tympanic membrane, ear canal and external ear normal.      Nose: Nose normal. No nasal deformity, septal deviation, mucosal edema or rhinorrhea.      Right Sinus: No maxillary sinus tenderness or frontal sinus tenderness.      Left Sinus: No maxillary sinus tenderness or frontal sinus tenderness.      Mouth/Throat:      Mouth: Mucous membranes are not pale and not dry.      Dentition: No dental caries.      Pharynx: Uvula midline. No oropharyngeal exudate or posterior oropharyngeal erythema.   Eyes:      General: Lids are normal. No scleral icterus.     Extraocular Movements:      Right eye: Normal extraocular motion and no nystagmus.      Left eye: Normal extraocular motion and no nystagmus.      Conjunctiva/sclera: Conjunctivae normal.      Right eye: Right conjunctiva is not injected. No chemosis.     Left eye: Left conjunctiva is not injected. No chemosis.     Pupils: Pupils are equal, round, and reactive to light.   Neck:      Thyroid: No thyroid mass or thyromegaly.      Trachea: Trachea and phonation normal. No tracheal tenderness or tracheal deviation.   Pulmonary:      Effort: Pulmonary effort is normal. No respiratory distress.      Breath sounds: No stridor.   Abdominal:      General: There is no distension.   Lymphadenopathy:      Head:      Right side of head: No submental, submandibular, preauricular, posterior auricular or occipital adenopathy.      Left side of head: No submental, submandibular, preauricular, posterior auricular or occipital adenopathy.      Cervical: No cervical adenopathy.   Skin:     General: Skin is warm and dry.      Findings: No erythema or rash.   Neurological:      Mental Status: She is alert and oriented to person, place, and time.      Cranial Nerves: No cranial nerve deficit.   Psychiatric:         Behavior: Behavior normal.      Procedure Note    CHIEF COMPLAINT:  Cerumen Impaction    Description:  The patient was seated in an exam chair.  An ear speculum was  placed in the right EAC and was examined under the microscope.  Suction and/or loop curettes were used to remove a large cerumen impaction.  The tympanic membrane was visualized and was normal in appearance.  The procedure was repeated on the left side in a similar fashion.  The TM was intact and normal on this side as well.  The patient tolerated the procedure well.     Assessment:     1. Impacted cerumen of right ear        Plan:     Impacted cerumen of right ear       Cerumen impaction:  Removed today without difficulty.  I would recommend the use of a wax softening drop, either over the counter Debrox or mineral oil, on a weekly basis.  I also instructed the patient to avoid Qtips.

## 2023-05-31 NOTE — PROGRESS NOTES
Linette Macias.    I am happy to report that your recent breast imaging did NOT show evidence of cancer.    An annual mammogram is the best test to screen for breast cancer, but it is not perfect, and it can miss some cancers. So, even though your mammogram was normal, if you notice any lump or change in one of your breasts, please schedule an appointment with me for a proper evaluation.    Thanks for letting me care for you, and thanks for trusting [facility] with your healthcare needs.    Sincerely,    CORI Epps MD

## 2023-07-03 ENCOUNTER — OFFICE VISIT (OUTPATIENT)
Dept: RHEUMATOLOGY | Facility: CLINIC | Age: 58
End: 2023-07-03
Payer: COMMERCIAL

## 2023-07-03 VITALS
BODY MASS INDEX: 26.96 KG/M2 | SYSTOLIC BLOOD PRESSURE: 116 MMHG | WEIGHT: 167.75 LBS | HEART RATE: 86 BPM | HEIGHT: 66 IN | DIASTOLIC BLOOD PRESSURE: 77 MMHG | RESPIRATION RATE: 16 BRPM

## 2023-07-03 DIAGNOSIS — Z51.81 MEDICATION MONITORING ENCOUNTER: ICD-10-CM

## 2023-07-03 DIAGNOSIS — Z71.9 COUNSELING, UNSPECIFIED: ICD-10-CM

## 2023-07-03 DIAGNOSIS — M81.0 AGE-RELATED OSTEOPOROSIS WITHOUT CURRENT PATHOLOGICAL FRACTURE: Primary | ICD-10-CM

## 2023-07-03 PROCEDURE — 99214 PR OFFICE/OUTPT VISIT, EST, LEVL IV, 30-39 MIN: ICD-10-PCS | Mod: S$GLB,,,

## 2023-07-03 PROCEDURE — 99999 PR PBB SHADOW E&M-EST. PATIENT-LVL IV: ICD-10-PCS | Mod: PBBFAC,,,

## 2023-07-03 PROCEDURE — 99214 OFFICE O/P EST MOD 30 MIN: CPT | Mod: S$GLB,,,

## 2023-07-03 PROCEDURE — 99999 PR PBB SHADOW E&M-EST. PATIENT-LVL IV: CPT | Mod: PBBFAC,,,

## 2023-07-03 RX ORDER — ALENDRONATE SODIUM 70 MG/1
70 TABLET ORAL
Qty: 12 TABLET | Refills: 3 | Status: SHIPPED | OUTPATIENT
Start: 2023-07-03

## 2023-07-03 NOTE — PROGRESS NOTES
RHEUMATOLOGY OUTPATIENT CLINIC NOTE    07/03/2023    Subjective:       Patient ID: Linette Schmidt is a 58 y.o. female.    Chief Complaint: Osteoporosis    HPI     Linette Schmidt is a 58 y.o. pleasant female here for rheumatology follow up for osteoporosis.     Denies any falls or fractures since last appointment.  She stays active.  She takes a combine calcium and vitamin-D supplement for a total of 45 mcg of vitamin-D daily (1800 IU).  Denies any planned upcoming invasive dental work.  Rheumatologic review of systems negative otherwise      Osteoporosis questionnaire  Current meds for osteopenia/ osteoporosis: Fosamax 6/2019-present  Prior meds for osteopenia/ osteoporosis: None prior to fosamax  Prev dexa scan:  06/2019 osteoporosis at spine with significant decline in BMD at spine and hip  Vit D supplement:  Yes. Unsure current dosage  Menopause:  2/2 hysterectomy  Hystrectomy: Early 40s   HRT: None  Smoker/tobacco: None  Etoh: One drink a week or less  Falls: None.   Activity level: Walk  Kidney problems : No   Prev fracture :  Broken ribs from coughing with pneumonia. Left pinky finger got caught in swing chain. Broken toe from trauma.   Steroids : No  Family history :  Father - on medication for years and had femur fracture while on medication.   PPI/gerd:  No  Other risk factors : None  Dental issues: No  Anticipated dental work :  No  H/o cancer/tx: No    Rheumatologic review of systems negative otherwise.         Past Medical History:   Diagnosis Date    Asthma     Osteoporosis      Past Surgical History:   Procedure Laterality Date    FINGER FRACTURE SURGERY Left     5th finger    HYSTERECTOMY      PARTIAL HYSTERECTOMY       Family History   Problem Relation Age of Onset    Hypertension Mother     Hypertension Father     Hypertension Sister     Breast cancer Neg Hx     Colon cancer Neg Hx     Ovarian cancer Neg Hx      Social History     Socioeconomic History    Marital status:   "   Number of children: 2   Tobacco Use    Smoking status: Never    Smokeless tobacco: Never   Substance and Sexual Activity    Alcohol use: Yes     Comment: socially    Drug use: No    Sexual activity: Yes     Partners: Male     Birth control/protection: See Surgical Hx     Review of patient's allergies indicates:  No Known Allergies        Objective:   /77 (BP Location: Left arm, Patient Position: Sitting, BP Method: Medium (Automatic))   Pulse 86   Resp 16   Ht 5' 6" (1.676 m)   Wt 76.1 kg (167 lb 12.3 oz)   BMI 27.08 kg/m²   Immunization History   Administered Date(s) Administered    COVID-19, MRNA, LN-S, PF (MODERNA FULL 0.5 ML DOSE) 04/16/2021, 05/17/2021, 11/19/2021, 02/28/2022    COVID-19, mRNA, LNP-S, bivalent booster, PF (Moderna Omicron) 03/04/2023    Influenza 10/07/2020    Influenza - Quadrivalent - MDCK - PF 10/07/2019    Influenza - Quadrivalent - PF *Preferred* (6 months and older) 09/22/2021, 09/29/2022    Pneumococcal Polysaccharide - 23 Valent 06/22/2020    Tdap 01/29/2007, 08/24/2018    Zoster Recombinant 10/07/2019, 01/11/2020              Physical Exam   Constitutional: She appears well-developed. No distress.   HENT:   Head: Normocephalic and atraumatic.   Pulmonary/Chest: Effort normal. No respiratory distress.   Musculoskeletal:         General: No swelling, tenderness or deformity. Normal range of motion.      Comments: Able to rise from a chair independently.  Walks without assistance.  Steady gait.  No kyphosis.     Neurological: She is alert.   Skin: Skin is warm. Capillary refill takes less than 2 seconds. No rash noted. She is not diaphoretic. No erythema.   Psychiatric: Her behavior is normal. Judgment and thought content normal.   Vitals reviewed.        No results found for this or any previous visit (from the past 672 hour(s)).     No results found for: TBGOLDPLUS   No results found for: HAV, HEPAIGM, HEPBIGM, HEPBCAB, HBEAG, HEPCAB       DEXA 06/17/2019  L1-L4-2.5, " L4-2.9, FN-2.2, TH-2.2,-16.5% significant decline in spine,-16.0% significant decline in hip.  Osteoporosis at spine with significant decline at spine and hip.    DEXA 07/06/2022  L1-L4 -1.9, L4 -2.4, FN-1.8, TH -1.9, major FRAX 24.7%, hip FRAX 1.6%    Assessment:       1. Age-related osteoporosis without current pathological fracture    2. Counseling, unspecified    3. Medication monitoring encounter            Impression:     Osteoporosis  Osteoporosis bone density scores 06/2019.  Started on Fosamax 70 mg weekly 2019 which she has been taking appropriately.  Also on vitamin-D and calcium supplements.  1800 IU of vitamin-D daily.  Vitamin-D  63 on most recent labs 05/2022.  Risk factors include prior rib fractures, family history (father).  Currently tolerating Fosamax.  One recent fall with no resulting fractures.  DEXA 07/06/2022 reviewed and discussed with patient.  Improvement in both the spine and the hips to osteopenic scores, high major FRAX.    Tolerating Fosamax well.    Other specified counseling  Over 10 minutes spent regarding below topics:  - Nutrition and exercise counseling.  - Medication counseling provided.   - encouraged importance of staying moving, weight-bearing activity as tolerated    Plan:          Osteoporosis:   Continue Fosamax 70 mg once weekly.  At this time anticipate Fosamax for a total of 5 years (June 2019 -  June 2024) as long as no complications in the interim.    Continue vitamin-D and calcium supplementation.     Weight-bearing activity as tolerated, caution to avoid falls.  Weight-bearing activity and exercises printed for patient.    Return to clinic in 1 year with CMP    Zaida Rangel PA-C  Rheumatology Department   [facility]        30 minutes of total time spent on the encounter, which includes face to face time and non-face to face time preparing to see the patient (eg, review of tests), Obtaining and/or reviewing separately obtained  history, Documenting clinical information in the electronic or other health record, Independently interpreting results (not separately reported) and communicating results to the patient/family/caregiver, or Care coordination (not separately reported).    Disclaimer: This note was prepared using voice recognition system and is likely to have sound alike errors and is not proof read.  Please call me with any questions

## 2023-07-06 ENCOUNTER — PATIENT MESSAGE (OUTPATIENT)
Dept: ADMINISTRATIVE | Facility: OTHER | Age: 58
End: 2023-07-06
Payer: COMMERCIAL

## 2023-07-06 ENCOUNTER — CLINICAL SUPPORT (OUTPATIENT)
Dept: INTERNAL MEDICINE | Facility: CLINIC | Age: 58
End: 2023-07-06
Payer: COMMERCIAL

## 2023-07-06 ENCOUNTER — OFFICE VISIT (OUTPATIENT)
Dept: INTERNAL MEDICINE | Facility: CLINIC | Age: 58
End: 2023-07-06

## 2023-07-06 ENCOUNTER — CLINICAL SUPPORT (OUTPATIENT)
Dept: INTERNAL MEDICINE | Facility: CLINIC | Age: 58
End: 2023-07-06

## 2023-07-06 VITALS
BODY MASS INDEX: 26.52 KG/M2 | WEIGHT: 165 LBS | DIASTOLIC BLOOD PRESSURE: 71 MMHG | TEMPERATURE: 97 F | HEIGHT: 66 IN | OXYGEN SATURATION: 98 % | SYSTOLIC BLOOD PRESSURE: 108 MMHG | HEART RATE: 67 BPM

## 2023-07-06 DIAGNOSIS — Z00.00 PREVENTATIVE HEALTH CARE: Primary | ICD-10-CM

## 2023-07-06 DIAGNOSIS — R09.89 PROMINENT ABDOMINAL AORTIC PULSATION: ICD-10-CM

## 2023-07-06 DIAGNOSIS — Z00.00 ROUTINE GENERAL MEDICAL EXAMINATION AT A HEALTH CARE FACILITY: Primary | ICD-10-CM

## 2023-07-06 DIAGNOSIS — E66.3 OVERWEIGHT (BMI 25.0-29.9): ICD-10-CM

## 2023-07-06 DIAGNOSIS — R13.12 OROPHARYNGEAL DYSPHAGIA: ICD-10-CM

## 2023-07-06 DIAGNOSIS — I10 ESSENTIAL HYPERTENSION: Chronic | ICD-10-CM

## 2023-07-06 LAB
ALBUMIN SERPL BCP-MCNC: 4.2 G/DL (ref 3.5–5.2)
ALP SERPL-CCNC: 50 U/L (ref 55–135)
ALT SERPL W/O P-5'-P-CCNC: 15 U/L (ref 10–44)
ANION GAP SERPL CALC-SCNC: 10 MMOL/L (ref 8–16)
AST SERPL-CCNC: 21 U/L (ref 10–40)
BILIRUB SERPL-MCNC: 0.9 MG/DL (ref 0.1–1)
BUN SERPL-MCNC: 19 MG/DL (ref 6–20)
CALCIUM SERPL-MCNC: 9.7 MG/DL (ref 8.7–10.5)
CHLORIDE SERPL-SCNC: 106 MMOL/L (ref 95–110)
CHOLEST SERPL-MCNC: 139 MG/DL (ref 120–199)
CHOLEST/HDLC SERPL: 2.5 {RATIO} (ref 2–5)
CO2 SERPL-SCNC: 29 MMOL/L (ref 23–29)
CREAT SERPL-MCNC: 0.8 MG/DL (ref 0.5–1.4)
ERYTHROCYTE [DISTWIDTH] IN BLOOD BY AUTOMATED COUNT: 12.4 % (ref 11.5–14.5)
EST. GFR  (NO RACE VARIABLE): >60 ML/MIN/1.73 M^2
ESTIMATED AVG GLUCOSE: 94 MG/DL (ref 68–131)
GLUCOSE SERPL-MCNC: 92 MG/DL (ref 70–110)
HBA1C MFR BLD: 4.9 % (ref 4–5.6)
HCT VFR BLD AUTO: 37.6 % (ref 37–48.5)
HDLC SERPL-MCNC: 56 MG/DL (ref 40–75)
HDLC SERPL: 40.3 % (ref 20–50)
HGB BLD-MCNC: 13.1 G/DL (ref 12–16)
LDLC SERPL CALC-MCNC: 67.2 MG/DL (ref 63–159)
MCH RBC QN AUTO: 29.8 PG (ref 27–31)
MCHC RBC AUTO-ENTMCNC: 34.8 G/DL (ref 32–36)
MCV RBC AUTO: 86 FL (ref 82–98)
NONHDLC SERPL-MCNC: 83 MG/DL
PLATELET # BLD AUTO: 205 K/UL (ref 150–450)
PMV BLD AUTO: 12.3 FL (ref 9.2–12.9)
POTASSIUM SERPL-SCNC: 4.2 MMOL/L (ref 3.5–5.1)
PROT SERPL-MCNC: 6.8 G/DL (ref 6–8.4)
RBC # BLD AUTO: 4.4 M/UL (ref 4–5.4)
SODIUM SERPL-SCNC: 145 MMOL/L (ref 136–145)
TRIGL SERPL-MCNC: 79 MG/DL (ref 30–150)
TSH SERPL DL<=0.005 MIU/L-ACNC: 1.32 UIU/ML (ref 0.4–4)
WBC # BLD AUTO: 4.25 K/UL (ref 3.9–12.7)

## 2023-07-06 PROCEDURE — 99396 PREV VISIT EST AGE 40-64: CPT | Mod: S$GLB,,, | Performed by: FAMILY MEDICINE

## 2023-07-06 PROCEDURE — 99999 PR PBB SHADOW E&M-EST. PATIENT-LVL V: ICD-10-PCS | Mod: PBBFAC,,, | Performed by: FAMILY MEDICINE

## 2023-07-06 PROCEDURE — 85027 COMPLETE CBC AUTOMATED: CPT | Performed by: FAMILY MEDICINE

## 2023-07-06 PROCEDURE — 99999 PR PBB SHADOW E&M-EST. PATIENT-LVL V: CPT | Mod: PBBFAC,,, | Performed by: FAMILY MEDICINE

## 2023-07-06 PROCEDURE — 99211 OFF/OP EST MAY X REQ PHY/QHP: CPT | Mod: S$GLB,,, | Performed by: INTERNAL MEDICINE

## 2023-07-06 PROCEDURE — 99396 PR PREVENTIVE VISIT,EST,40-64: ICD-10-PCS | Mod: S$GLB,,, | Performed by: FAMILY MEDICINE

## 2023-07-06 PROCEDURE — 80053 COMPREHEN METABOLIC PANEL: CPT | Performed by: FAMILY MEDICINE

## 2023-07-06 PROCEDURE — 99211 PR NURSE VISIT, 15 MINS, EXEC HLTH ONLY: ICD-10-PCS | Mod: S$GLB,,, | Performed by: INTERNAL MEDICINE

## 2023-07-06 PROCEDURE — 84443 ASSAY THYROID STIM HORMONE: CPT | Performed by: FAMILY MEDICINE

## 2023-07-06 PROCEDURE — 83036 HEMOGLOBIN GLYCOSYLATED A1C: CPT | Performed by: FAMILY MEDICINE

## 2023-07-06 PROCEDURE — 80061 LIPID PANEL: CPT | Performed by: FAMILY MEDICINE

## 2023-07-06 RX ORDER — HYDROCHLOROTHIAZIDE 12.5 MG/1
12.5 TABLET ORAL
COMMUNITY
Start: 2023-04-10

## 2023-07-06 NOTE — PROGRESS NOTES
"EXECUTIVE HEALTH ENCOUNTER  7/6/23      REASON FOR VISIT  EXECUTIVE HEALTH    PCP (Primary Care Provider)  I am Linette's PCP.    HISTORY  She comes in for her annual executive health exam. She reports eating healthily and remaining active, but still has difficulty losing weight. She agreed to enroll in our lifestyle and wellness clinic. Her hypertension is well-controlled, with blood pressure readings below the therapeutic goal. It is currently managed by her cardiologist, Dr. Marky Armendariz. She wants to defer the management of her blood pressure to her cardiologist for now. She agreed to enroll in the hypertension digital medicine program. I encouraged her to bring a record of her blood pressure readings to her next cardiology appointment in October and discuss the possibility of discontinuing one of her blood pressure medications.    She reports a chronic, worsening problem of what sounds like aspiration. She often "gets choked" when drinking water, stating that it "goes down the wrong pipe." This problem appears to be worsening and does not happen with solid foods. We discussed differential diagnoses. She agreed to have further evaluation with a swallowing function study.    An incidental finding on the physical exam is a prominent aortic pulse without obvious aneurysm formation. It was agreed to evaluate this with an ultrasound.    She will schedule a video visit follow-up with me after completing these tests to review the results and discuss next steps.    ASSESSMENT/PLAN  1. Preventative health care    2. Overweight (BMI 25.0-29.9)  -     Ambulatory referral/consult to [facility]; Future; Expected date: 07/13/2023    3. Essential hypertension  Overview:  Cardiologist: Dr. Marky Armendariz    Assessment & Plan:  BP Readings from Last 6 Encounters:   07/06/23 108/71   07/03/23 116/77   03/21/23 118/74   09/29/22 108/70   08/11/22 112/72   07/06/22 106/73     Lab Results   Component Value Date    " "EGFRNORACEVR >60 09/29/2022    CREATININE 0.7 09/29/2022    BUN 22 (H) 09/29/2022    K 5.3 (H) 09/29/2022     09/29/2022     09/29/2022     Results for orders placed or performed during the hospital encounter of 09/29/22   EKG 12-lead    Collection Time: 09/29/22 10:16 AM    Narrative    Test Reason : Z00.00,    Vent. Rate : 072 BPM     Atrial Rate : 072 BPM     P-R Int : 114 ms          QRS Dur : 084 ms      QT Int : 414 ms       P-R-T Axes : 070 072 060 degrees     QTc Int : 453 ms    Normal sinus rhythm  Normal ECG  When compared with ECG of 22-JUN-2020 07:52,  No significant change was found  Confirmed by TRCAY GOEL MD ([id number]) on 9/30/2022 7:27:07 AM    Referred By: SEFERINO HAMMONDS           Confirmed By:TRACY GOEL MD        Orders:  -     Hypertension Digital Medicine (HDMP) Enrollment Order    4. Oropharyngeal dysphagia  -     Fl Modified Barium Swallow Speech; Future; Expected date: 07/06/2023  -     SLP video swallow; Future; Expected date: 07/06/2023    5. Prominent abdominal aortic pulsation  -     US Abdominal Aorta; Future; Expected date: 07/06/2023      PHYSICAL EXAM  Vitals:    07/06/23 0801   BP: 108/71   Pulse: 67   Temp: 97.1 °F (36.2 °C)   SpO2: 98%   Weight: 74.8 kg (165 lb)   Height: 5' 6" (1.676 m)   Physical Exam  Vitals reviewed.   Constitutional:       General: She is not in acute distress.     Appearance: Normal appearance. She is not ill-appearing, toxic-appearing or diaphoretic.   HENT:      Head: Normocephalic and atraumatic.      Right Ear: Tympanic membrane, ear canal and external ear normal.      Left Ear: Tympanic membrane, ear canal and external ear normal.      Ears:      Comments: Hearing grossly intact.  Eyes:      General: No scleral icterus.     Conjunctiva/sclera: Conjunctivae normal.   Neck:      Vascular: No carotid bruit.   Cardiovascular:      Rate and Rhythm: Normal rate and regular rhythm.      Heart sounds: Normal heart sounds.   Pulmonary:      " Effort: Pulmonary effort is normal.      Breath sounds: Normal breath sounds.   Abdominal:      General: Bowel sounds are normal. There is no distension.      Palpations: Abdomen is soft. There is no mass.      Tenderness: There is no abdominal tenderness.      Comments: Prominent aortic pulsation.   Musculoskeletal:         General: No tenderness.      Cervical back: No muscular tenderness.   Lymphadenopathy:      Cervical: No cervical adenopathy.   Skin:     General: Skin is warm and dry.      Coloration: Skin is not jaundiced.   Neurological:      General: No focal deficit present.      Mental Status: She is alert and oriented to person, place, and time.      Cranial Nerves: No cranial nerve deficit.      Gait: Gait normal.   Psychiatric:         Mood and Affect: Mood normal.         Behavior: Behavior normal.         Judgment: Judgment normal.       MEDICATIONS  Linette has a current medication list which includes the following prescription(s): alendronate, ascorbic acid (vitamin c), aspirin, ca-d3-mag ox-zinc--rodney-bor, hydrochlorothiazide, mv-mn/iron/folic acid/herb 190, pravastatin, proair hfa, and ramipril.    HEALTH MAINTENANCE AND SCREENINGS - UP TO DATE  Health Maintenance Topics with due status: Not Due       Topic Last Completion Date    TETANUS VACCINE 08/24/2018    Colorectal Cancer Screening 03/22/2021    Influenza Vaccine 09/29/2022    Mammogram 05/23/2023    Hemoglobin A1c (Diabetic Prevention Screening) 07/06/2023    Lipid Panel 07/06/2023     HEALTH MAINTENANCE AND SCREENINGS - DUE OR DUE SOON  Health Maintenance Due   Topic Date Due    Hepatitis C Screening  Never done    Pneumococcal Vaccines (Age 0-64) (2 - PCV) 06/22/2021     FOLLOW-UP  Linette is to follow up with me for any health problems or concerns, any abnormal test results, and any age-appropriate health maintenance interventions and screenings that may be due.    PROBLEM LIST  Linette has Mild intermittent asthma without  "complication; Mixed hyperlipidemia; Essential hypertension; Lichen sclerosus; Dyspareunia, female; Vulvar cyst; Age-related osteoporosis, improved to osteopenia with alendronate; Oropharyngeal dysphagia; Overweight (BMI 25.0-29.9); and Prominent abdominal aortic pulsation on their problem list.    PAST MEDICAL HISTORY  Linette has a past medical history of Asthma and Osteoporosis.    SURGICAL HISTORY  Linette has a past surgical history that includes Partial hysterectomy; Finger fracture surgery (Left); and Hysterectomy.    FAMILY HISTORY  Linette family history includes Hypertension in her father, mother, and sister.     ALLERGIES  Linette reports she has No Known Allergies.    SOCIAL HISTORY  Linette  reports that she has never smoked. She has never used smokeless tobacco. She reports current alcohol use. She reports that she does not use drugs.     Documentation entered by me for this encounter may have been done in part using speech-recognition technology. Although I have made an effort to ensure accuracy, "sound like" errors may exist and should be interpreted in context.  "

## 2023-07-06 NOTE — LETTER
Dear Linette,    I enjoyed seeing you again at your recent Executive Health exam.    This letter and the accompanying reports will summarize your medical history, physical exam findings, and test results.    Please send me a [facility] Patient Portal message if I can answer any questions.    Thanks for letting me care for you and trusting [facility] with your healthcare needs.    All the best,     CORI Epps MD     ENCLOSURES       Test Results Summary for Linette Schmidt,  1965       CBC: The complete blood count (CBC) checks for anemia and measures your blood's red blood cells, white blood cells, and platelets.  o YOUR RESULTS: Normal     CMP: The comprehensive metabolic panel (CMP) checks kidney function, liver function, sodium, potassium, glucose (sugar), and other aspects of your metabolism.  o YOUR RESULTS: Essentially normal     Lipid Panel: The lipid panel measures the levels of different fatty substances in your blood (cholesterol and triglycerides) that can contribute to plaque buildup in your arteries (atherosclerosis).  o YOUR RESULTS: Normal     TSH: The thyroid is a gland in the neck that helps regulate metabolism. The thyroid stimulating hormone (TSH) is a measure of your thyroid activity.  o YOUR RESULTS: Normal     Modified Barium Swallow Study: A modified barium swallow study is a medical test where a patient swallows a liquid containing barium, and then X-rays are taken to see how the barium moves through the digestive system.  o YOUR RESULTS: Not yet available. I will send you a Patient Portal message with your results after I have reviewed them.     Abdominal Aortic Ultrasound: An abdominal aortic ultrasound is a medical test that uses sound waves to create images of the abdominal aorta, which is the largest blood vessel in the abdomen, to check for any abnormalities or blockages.  o YOUR RESULTS: Not yet available. I will send you a Patient Portal message with your  results after I have reviewed them.

## 2023-07-18 ENCOUNTER — HOSPITAL ENCOUNTER (OUTPATIENT)
Dept: RADIOLOGY | Facility: HOSPITAL | Age: 58
Discharge: HOME | End: 2023-07-18
Attending: FAMILY MEDICINE
Payer: COMMERCIAL

## 2023-07-18 DIAGNOSIS — R09.89 PROMINENT ABDOMINAL AORTIC PULSATION: ICD-10-CM

## 2023-07-18 PROCEDURE — 76775 US ABDOMINAL AORTA: ICD-10-PCS | Mod: 26,,, | Performed by: RADIOLOGY

## 2023-07-18 PROCEDURE — 76775 US EXAM ABDO BACK WALL LIM: CPT | Mod: TC

## 2023-07-18 PROCEDURE — 76775 US EXAM ABDO BACK WALL LIM: CPT | Mod: 26,,, | Performed by: RADIOLOGY

## 2023-07-28 ENCOUNTER — CLINICAL SUPPORT (OUTPATIENT)
Dept: SPEECH THERAPY | Facility: HOSPITAL | Age: 58
End: 2023-07-28
Attending: FAMILY MEDICINE
Payer: COMMERCIAL

## 2023-07-28 ENCOUNTER — HOSPITAL ENCOUNTER (OUTPATIENT)
Dept: RADIOLOGY | Facility: HOSPITAL | Age: 58
Discharge: HOME | End: 2023-07-28
Attending: FAMILY MEDICINE
Payer: COMMERCIAL

## 2023-07-28 DIAGNOSIS — R13.12 OROPHARYNGEAL DYSPHAGIA: ICD-10-CM

## 2023-07-28 PROCEDURE — 74230 X-RAY XM SWLNG FUNCJ C+: CPT | Mod: TC

## 2023-07-28 PROCEDURE — 74230 FL MODIFIED BARIUM SWALLOW SPEECH STUDY: ICD-10-PCS | Mod: 26,,, | Performed by: RADIOLOGY

## 2023-07-28 PROCEDURE — 92610 EVALUATE SWALLOWING FUNCTION: CPT | Mod: 59

## 2023-07-28 PROCEDURE — A9698 NON-RAD CONTRAST MATERIALNOC: HCPCS | Performed by: FAMILY MEDICINE

## 2023-07-28 PROCEDURE — 74230 X-RAY XM SWLNG FUNCJ C+: CPT | Mod: 26,,, | Performed by: RADIOLOGY

## 2023-07-28 PROCEDURE — 25500020 PHARM REV CODE 255: Performed by: FAMILY MEDICINE

## 2023-07-28 PROCEDURE — 92611 MOTION FLUOROSCOPY/SWALLOW: CPT

## 2023-07-28 RX ADMIN — BARIUM SULFATE 50 ML: 0.81 POWDER, FOR SUSPENSION ORAL at 03:07

## 2023-07-31 NOTE — PLAN OF CARE
[facility]   Outpatient MODIFIED BARIUM SWALLOW STUDY    Date: 7/28/2023     Name: Linette Schmidt   MRN: [id number]    Therapy Diagnosis: WFL oral and pharyngeal phases of the swallow    Physician: COY Epps MD  Physician Orders: Fl Modified Barium Swallow Speech/SLP Video Swallow  Medical Diagnosis from Referral: R13.12 (ICD-10-CM) - Oropharyngeal dysphagia    Date of Evaluation:  7/28/2023    Procedure Min.   Swallow and Oral Function Evaluation   8   Fl Modified Barium Swallow Speech  12     Time in: 3:30 PM   Time out: 3:50 PM   Total Billable Time: 20 minutes    Precautions: Standard  Subjective   History of Current Condition: Linette Schmidt is a 58 y.o. female here today for Modified Barium Swallow Study (MBSS). Patient reported choking on water about twice a day. She reported this began about two years ago although there was no significant event that can be related to this symptom emerging. She reported her father had difficulty with swallowing.     -Current diet at home: Thin liquids/Regular consistencies   -Recommended diet from previous study: N/A  -Therapy received: N/A    The following observations were made:   -Mental status: Alert and Cooperative  -Factors affecting performance: no difficulties participating in the study  -Feeding Method: independent in self-feeding    Respiratory Status:   -Respiratory Status: room air    Past Medical History: Linette Schmidt  has a past medical history of Asthma and Osteoporosis.  Linette Schmidt  has a past surgical history that includes Partial hysterectomy; Finger fracture surgery (Left); and Hysterectomy.    Medical Hx and Allergies: Review of patient's allergies indicates:  No Known Allergies    Objective     Modified Barium Swallow Study  Purpose: to evaluate anatomy and physiology of the oropharyngeal swallow, to determine effectiveness of rehabilitation strategies, and to determine diet consistency and  "intervention recommendations. The study was performed using the "Gold Standard" of 30 fps with as low as reasonably achievable (ALARA) exposure.     The patient was seen in radiology seated in High Lester's position in a video imaging chair for lateral views of the larynx and an A/P view. The study was conducted using Varibar thin liquid (IDDSI 0), Varibar nectar liquid (IDDSI 2), Varibar pudding (IDDSI 4), Peaches mixed with Varibar thin liquid (IDDSI 6/0), and solid coated in Varibar pudding (IDDSI 7). She tolerated the procedure well.    A cranial nerve evaluation revealed:   Cranial Nerve Examination  Cranial Nerve 5: Trigeminal Nerve  Motor Jaw Posture at rest: Closed  Mandible Elevation/Depression: WFL  Mandible lateralization: WFL  Abnormal movement: absent Interpretation: Intact bilaterally    Sensory Forehead: WFL  Cheek: WFL  Jaw: WFL  Facial Pain: None noted Interpretation: Intact bilaterally      Cranial Nerve 7: Facial Nerve  Motor Facial Symmetry: WNL  Wrinkle Forehead: WFL  Close eyes tightly: WFL  Labial Protrusion: WFL  Labial Retraction: WFL  Buccal Strength with Labial Seal: WFL  Abnormal movement: absent Interpretation: Intact bilaterally    Sensory Formal testing not completed.       Cranial Nerves IX and X: Glossopharyngeal and Vagus Nerves  Motor Palatal Symmetry (Rest): WNL  Palatal Symmetry (Movement): WNL  Cough: Perceptually strong  Voice Prior to PO intake: Clear  Resonance: Normal  Abnormal movement: absent Interpretation: Intact bilaterally      Cranial Nerve XII: Hypoglossal Nerve  Motor Tongue at rest: WNL  Lingual Protrusion: WNL  Lingual Protrusion against Resistance: WNL  Lingual Lateralization: WNL  Abnormal movement: absent Interpretation: Intact bilaterally      Other information:  Volitional Swallow: Able to palpate laryngeal rise  Mucosal Quality: No abnormal findings  Secretion Management: no overt deficits noted/observed  Dentition: Good condition for speech and " mastication        Consistency  Presentation  Findings Strategy Attempted Rosenbeck's Penetration/Aspiration Scale (PAS)   Thin (IDDSI 0) Method: Self-fed    Volume: cup sip x5,  sequential sips    Projection: lateral view; AP view  Oral phase: WFL    Pharyngeal phase: Flash penetration during the swallow     Esophageal screen: Aerophagia; retention at the medial esophagus   Best: (2) Material enters the airway, remains above the vocal folds, and is ejected from the airway    Worst: (2) Material enters the airway, remains above the vocal folds, and is ejected from the airway     Puree (extremely thick/ IDDSI 4) Method: Self-fed    Volume: tsp bite x4     Projection: lateral view Oral phase: WFL    Pharyngeal phase: Trace residue at the base of tongue and valleculae that is cleared with a cued and/or spontaneous sequential swallow.     Esophageal screen: Retention at the medial esophagus    Best: (1) Material does not enter the airway    Worst: (1) Material does not enter the airway     Solid (regular/ IDDSI 7) Method: Self-fed    Volume: bite x3     Projection: lateral view, AP view Oral phase: WFL    Pharyngeal phase: Trace-minimal residue at the base of tongue and valleculae that clears with a cued and/or spontaneous sequential swallow.   Best: (1) Material does not enter the airway    Worst: (1) Material does not enter the airway     Mixed consistency (thin/ IDDSI 0 + soft and bite sized/ IDDSI 6) Method: Self-fed    Volume: bite x2     Projection: lateral view Oral phase: WFL    Pharyngeal phase: WFL  Best: (1) Material does not enter the airway    Worst: (1) Material does not enter the airway     Barium tablet  Method: Self-fed    Volume: single tablet with water bolus(es)    Projection: AP view Timely and efficient oropharyngeal clearance         Treatment   Treatment Time In: n/a  Treatment Time Out: n/a  Total Treatment Time: n/a  Patient educated regarding results and recommendations of the evaluation. See  the recommendations section below.    Education: Plan of Care and anatomy and physiology of swallow mechanism as it relates to MBSS findings and recommendations were discussed with the patient. Patient expressed understanding. All questions were answered.     Assessment     Linette Schmidt is a 58 y.o. female referred for Modified Barium Swallow Study with a medical diagnosis of Oropharyngeal dysphagia. The patient presents with oral and pharyngeal phases of the swallow within functional limits as determined by the Dysphagia Outcome and Severity Scale (KATIE). Level 7: Normal in all situations.    Modified Barium Swallow Study (MBSS) revealed oral phase characterized by adequate lingual and labial strength and range of motion for tongue control, bolus preparation and transport. Lip closure was adequate with no labial escape. Bolus prep and mastication was timely and efficient. Lingual motion was brisk for adequate bolus transport. There was no significant oral residue. The swallow was initiated when the head of the bolus passed the ramus of the mandible.    Pharyngeal phase characterized by timely initiation of swallow across consistencies. The soft palate elevated for complete of the velopharyngeal port. During pharyngeal swallow, minimallyreduced base of tongue retraction, anterior hyoid excursion, laryngeal elevation, and pharyngeal stripping wave resulted in incomplete epiglottic inversion and UES opening with  flash penetration of thin liquids only. Trace-minimal residue was observed at the base of tongue and valleculae and was cleared with a spontaneous and/or cued sequential swallow.      Robust Esophageal Screening Test (REST) was used to screen esophageal phase of swallow (Dev et al, 2021) in today's study, completed in anterior/posterior view, with intake of barium coated solid, large self-regulated sips of liquid, and barium tablet. Screening significant for dysmotility with aerophagia and retention  noted. Further esophageal imaging including barium esophagram as well as follow-up with GI is recommended.      Impressions: Patient presents with WFL oral and pharyngeal phases of the swallow. In consideration of the Dynamic Imaging Grade of Swallowing Toxicity (DIGEST) (Carlos Enrique et al, 2017), patient presents with preserved safety of swallow and mild efficiency impairment. Patient appears to be at low risk for aspiration related PNA in consideration of three pillars of aspiration pneumonia (David, 2005) including preserved oral health status, overall health/immune status, and laryngeal vestibule closure/severity of dysphagia. However, unable to assess risk related to aspiration pneumonia cause by the aspiration of gastric content. Patient would not benefit from swallowing rehabilitation at this time.     Functional Oral Intake Scale (FOIS)  The Functional Oral Intake Scale (FOIS) is an ordinal scale that is used to assess the current status and meaningful change in the oral intake. FOIS levels include:    TUBE DEPENDENT (levels 1-3) 1. No oral intake  2. Tube dependent with minimal/inconsistent oral intake  3. Tube supplements with consistent oral intake      TOTAL ORAL INTAKE (levels 4-7) 4. Total oral intake of a single consistency  5. Total oral intake of multiple consistencies requiring special preparation  6. Total oral intake with no special preparation, but must avoid specific foods or liquid items  7. Total oral intake with no restrictions     Patient is currently judged to be at FOIS level 7.      References:  RENZO Hernandez. (2005, March). Pneumonia: Factors Beyond Aspiration. Perspectives in Swallowing and Swallowing Disorders (Dysphagia), 14, 10-16.  Italo KA, Holden MP, Rebecca DA, Zach JOSEK, Kellie HY, Delio RS, Bryson CD, Gwyn SY, Bill CP, Natali J, Lazarus CL, May A, Jasper J, Conor JW, Taniya HM, Deb JS. Dynamic Imaging Grade of Swallowing Toxicity (DIGEST): Scale development and  validation. Cancer. 2017 Jan 1;123(1):62-70. doi: 10.1002/cncr.26232. Epub 2016 Aug 26. PMID: 12316374; PMCID: [id number].  CHANDRIKA Méndez, RENZO Sheth, LORETA Mccord, & RENZO Linares (2021). Diagnostic Accuracy of an Esophageal Screening Protocol Interpreted by the Speech-Language Pathologist. Dysphagia, 36(6), 4103-4394. [email or web address]    Recommendations:     Consistency Recommendations:  Thin liquids (IDDSI 0) and Regular consistencies (IDDSI 7).   Risk Management/Swallow Guidelines: use good oral hygiene , sit upright for all PO intake, increase physical mobility as tolerated, and behavioral reflux precautions  Specialist Referrals: GI  Ancillary Tests: Consider Barium Esophagram   Therapy: Dysphagia therapy is not recommended at this time.  Follow-up exam: Follow up swallow study is not indicated at this time.    Please contact [facility] at [phone] if there are questions re: the above or if we can be of additional service to this patient.    Lexi Narayanan, CCC-SLP, CBIS   Speech Language Pathologist   Certified Brain Injury Specialist   7/31/2023

## 2023-07-31 NOTE — PROGRESS NOTES
See MBSS.     Lexi Narayanan, CCC-SLP, CBIS   Speech Language Pathologist   Certified Brain Injury Specialist   7/31/2023

## 2023-08-01 ENCOUNTER — PATIENT MESSAGE (OUTPATIENT)
Dept: ADMINISTRATIVE | Facility: OTHER | Age: 58
End: 2023-08-01
Payer: COMMERCIAL

## 2023-08-09 ENCOUNTER — OFFICE VISIT (OUTPATIENT)
Dept: OBSTETRICS AND GYNECOLOGY | Facility: CLINIC | Age: 58
End: 2023-08-09
Payer: COMMERCIAL

## 2023-08-09 VITALS
HEIGHT: 66 IN | DIASTOLIC BLOOD PRESSURE: 66 MMHG | WEIGHT: 176.38 LBS | BODY MASS INDEX: 28.34 KG/M2 | SYSTOLIC BLOOD PRESSURE: 114 MMHG

## 2023-08-09 DIAGNOSIS — L90.0 LICHEN SCLEROSUS: ICD-10-CM

## 2023-08-09 DIAGNOSIS — Z01.419 GYNECOLOGIC EXAM NORMAL: Primary | ICD-10-CM

## 2023-08-09 PROBLEM — N94.10 DYSPAREUNIA, FEMALE: Status: RESOLVED | Noted: 2022-05-17 | Resolved: 2023-08-09

## 2023-08-09 PROBLEM — N90.7 VULVAR CYST: Status: RESOLVED | Noted: 2022-05-17 | Resolved: 2023-08-09

## 2023-08-09 PROBLEM — R13.12 OROPHARYNGEAL DYSPHAGIA: Status: RESOLVED | Noted: 2023-07-06 | Resolved: 2023-08-09

## 2023-08-09 PROCEDURE — 99396 PR PREVENTIVE VISIT,EST,40-64: ICD-10-PCS | Mod: S$GLB,,, | Performed by: OBSTETRICS & GYNECOLOGY

## 2023-08-09 PROCEDURE — 99396 PREV VISIT EST AGE 40-64: CPT | Mod: S$GLB,,, | Performed by: OBSTETRICS & GYNECOLOGY

## 2023-08-09 PROCEDURE — 99999 PR PBB SHADOW E&M-EST. PATIENT-LVL III: CPT | Mod: PBBFAC,,, | Performed by: OBSTETRICS & GYNECOLOGY

## 2023-08-09 PROCEDURE — 99999 PR PBB SHADOW E&M-EST. PATIENT-LVL III: ICD-10-PCS | Mod: PBBFAC,,, | Performed by: OBSTETRICS & GYNECOLOGY

## 2023-08-09 RX ORDER — PREDNISONE 20 MG/1
20 TABLET ORAL 2 TIMES DAILY
COMMUNITY
Start: 2023-08-07 | End: 2023-10-16 | Stop reason: ALTCHOICE

## 2023-08-09 NOTE — PROGRESS NOTES
"  Subjective:       Patient ID: Linette Schmidt is a 58 y.o. female.    Chief Complaint:  Well Woman      History of Present Illness  HPI  Patient presents to day for annual exam , postmenopausal.   No gyn complaints, no vaginal bleeding or pelvic pain noted.   History of lichen sclerosis .  On and off steroid cream, no symptoms at present   History of recurring sebacious cyst right vulva, not present currently     Hormonal therapy reviewed and discussed. On none   Preventive screening exam indication and testing reviewed and discussed.  Hysterectomy in the past, no pap indicated   DEXA and Mammogram up to date     Health Maintenance   Topic Date Due    Hepatitis C Screening  Never done    Mammogram  2024    Lipid Panel  2028    TETANUS VACCINE  2028     GYN & OB History  No LMP recorded. Patient has had a hysterectomy.   Date of Last Pap: No result found    OB History    Para Term  AB Living   2 2 2         SAB IAB Ectopic Multiple Live Births                  # Outcome Date GA Lbr Dylan/2nd Weight Sex Delivery Anes PTL Lv   2 Term            1 Term                Review of Systems  Review of Systems        Objective:   /66   Ht 5' 6" (1.676 m)   Wt 80 kg (176 lb 5.9 oz)   BMI 28.47 kg/m²    Physical Exam:   Constitutional: She appears well-developed and well-nourished. No distress.      Neck: No JVD present. No thyroid mass and no thyromegaly present.    Cardiovascular:  Normal rate and regular rhythm.                  Abdominal: Soft. Bowel sounds are normal. No hernia. Hernia confirmed negative in the ventral area, confirmed negative in the right inguinal area and confirmed negative in the left inguinal area.     Genitourinary:    Vagina and rectum normal.   The external female genitalia was abnormal.   No external genitalia lesions identified,Labial bartholins normal.There is no rash, tenderness, lesion or injury on the right labia. There is no rash, tenderness, " lesion or injury on the left labia. Right adnexum displays no mass, no tenderness and no fullness. Left adnexum displays no mass, no tenderness and no fullness. Vaginal cuff normal.  No erythema,  no vaginal discharge, tenderness or bleeding in the vagina.    No foreign body in the vagina.   Cervix is absent.Uterus is absent. Urethral Meatus exhibits: urethral lesion and prolapsedUrethra findings: no tendernessBladder findings: no bladder tenderness   Genitourinary Comments: Thinning of the vulva, mostly on the right with no lesions   typical for lichen sclerosis                         Assessment:        1. Gynecologic exam normal    2. Lichen sclerosus                Plan:            Linette was seen today for well woman.    Diagnoses and all orders for this visit:    Gynecologic exam normal    Lichen sclerosus

## 2023-08-21 ENCOUNTER — PATIENT MESSAGE (OUTPATIENT)
Dept: OPHTHALMOLOGY | Facility: CLINIC | Age: 58
End: 2023-08-21

## 2023-08-21 ENCOUNTER — OFFICE VISIT (OUTPATIENT)
Dept: OPHTHALMOLOGY | Facility: CLINIC | Age: 58
End: 2023-08-21
Payer: COMMERCIAL

## 2023-08-21 DIAGNOSIS — H52.4 MYOPIA WITH ASTIGMATISM AND PRESBYOPIA, BILATERAL: ICD-10-CM

## 2023-08-21 DIAGNOSIS — H53.8 BLURRED VISION, BILATERAL: Primary | ICD-10-CM

## 2023-08-21 DIAGNOSIS — H52.13 MYOPIA WITH ASTIGMATISM AND PRESBYOPIA, BILATERAL: ICD-10-CM

## 2023-08-21 DIAGNOSIS — H52.203 MYOPIA WITH ASTIGMATISM AND PRESBYOPIA, BILATERAL: ICD-10-CM

## 2023-08-21 PROCEDURE — 92014 PR EYE EXAM, EST PATIENT,COMPREHESV: ICD-10-PCS | Mod: S$GLB,,, | Performed by: OPTOMETRIST

## 2023-08-21 PROCEDURE — 92015 DETERMINE REFRACTIVE STATE: CPT | Mod: S$GLB,,, | Performed by: OPTOMETRIST

## 2023-08-21 PROCEDURE — 92015 PR REFRACTION: ICD-10-PCS | Mod: S$GLB,,, | Performed by: OPTOMETRIST

## 2023-08-21 PROCEDURE — 92014 COMPRE OPH EXAM EST PT 1/>: CPT | Mod: S$GLB,,, | Performed by: OPTOMETRIST

## 2023-08-21 PROCEDURE — 99999 PR PBB SHADOW E&M-EST. PATIENT-LVL III: ICD-10-PCS | Mod: PBBFAC,,, | Performed by: OPTOMETRIST

## 2023-08-21 PROCEDURE — 99999 PR PBB SHADOW E&M-EST. PATIENT-LVL III: CPT | Mod: PBBFAC,,, | Performed by: OPTOMETRIST

## 2023-08-21 NOTE — PROGRESS NOTES
HPI     Annual Exam            Comments:   Vision changes since last eye exam?: no  Any eye pain today: no  Other ocular symptoms: no  Interested in contact lens fitting today? No             Last edited by Marilyn Ornelas MA on 8/21/2023  3:05 PM.            Assessment /Plan     For exam results, see Encounter Report.    Blurred vision, bilateral    Myopia with astigmatism and presbyopia, bilateral      Eyeglass Final Rx       Eyeglass Final Rx         Sphere Cylinder Axis Add    Right -2.75 +3.25 082 +2.50    Left -2.00 +2.25 103 +2.50      Expiration Date: 8/21/2024                    RTC 1 yr for undilated eye exam or PRN if any problems.   Discussed above and answered questions.

## 2023-10-12 ENCOUNTER — PATIENT MESSAGE (OUTPATIENT)
Dept: PRIMARY CARE CLINIC | Facility: CLINIC | Age: 58
End: 2023-10-12

## 2023-10-12 ENCOUNTER — OFFICE VISIT (OUTPATIENT)
Dept: PRIMARY CARE CLINIC | Facility: CLINIC | Age: 58
End: 2023-10-12
Payer: COMMERCIAL

## 2023-10-12 VITALS
OXYGEN SATURATION: 98 % | HEIGHT: 66 IN | WEIGHT: 181.44 LBS | TEMPERATURE: 98 F | RESPIRATION RATE: 18 BRPM | BODY MASS INDEX: 29.16 KG/M2 | DIASTOLIC BLOOD PRESSURE: 80 MMHG | HEART RATE: 78 BPM | SYSTOLIC BLOOD PRESSURE: 114 MMHG

## 2023-10-12 DIAGNOSIS — E66.3 OVERWEIGHT (BMI 25.0-29.9): ICD-10-CM

## 2023-10-12 DIAGNOSIS — M81.0 AGE-RELATED OSTEOPOROSIS WITHOUT CURRENT PATHOLOGICAL FRACTURE: Chronic | ICD-10-CM

## 2023-10-12 DIAGNOSIS — J45.20 MILD INTERMITTENT ASTHMA WITHOUT COMPLICATION: Chronic | ICD-10-CM

## 2023-10-12 DIAGNOSIS — I10 ESSENTIAL HYPERTENSION: Chronic | ICD-10-CM

## 2023-10-12 DIAGNOSIS — R63.5 WEIGHT GAIN: Primary | ICD-10-CM

## 2023-10-12 DIAGNOSIS — Z86.39 HISTORY OF OBESITY: ICD-10-CM

## 2023-10-12 DIAGNOSIS — E78.2 MIXED HYPERLIPIDEMIA: Chronic | ICD-10-CM

## 2023-10-12 PROCEDURE — 99999 PR PBB SHADOW E&M-EST. PATIENT-LVL V: CPT | Mod: PBBFAC,,, | Performed by: FAMILY MEDICINE

## 2023-10-12 PROCEDURE — 99215 OFFICE O/P EST HI 40 MIN: CPT | Mod: S$GLB,,, | Performed by: FAMILY MEDICINE

## 2023-10-12 PROCEDURE — 99215 PR OFFICE/OUTPT VISIT, EST, LEVL V, 40-54 MIN: ICD-10-PCS | Mod: S$GLB,,, | Performed by: FAMILY MEDICINE

## 2023-10-12 PROCEDURE — 99999 PR PBB SHADOW E&M-EST. PATIENT-LVL V: ICD-10-PCS | Mod: PBBFAC,,, | Performed by: FAMILY MEDICINE

## 2023-10-12 RX ORDER — ALBUTEROL SULFATE 90 UG/1
2 AEROSOL, METERED RESPIRATORY (INHALATION) EVERY 4 HOURS PRN
Qty: 18 G | Refills: 2 | Status: SHIPPED | OUTPATIENT
Start: 2023-10-12 | End: 2023-10-13

## 2023-10-12 NOTE — PATIENT INSTRUCTIONS
"Protein goal: 90 grams/day  Goal: < 25 grams/day    Hearts of palm pasta  Spaghetti squash  Protein pasta  Add veggies  Red lentils    20 min 2x/week      Valentina Huynh    (Wegovy, Contrave)    Dietitian referral           PRODUCE  [] All fresh fruit   [] All fresh vegetables   [] All fresh herbs  [] All herb purees + pastes  [] Pre-spiralized vegetable noodles   [] Steam-In-The-Bag begetables  [] Riced cauliflower  [] Jicama sticks  [] Love Beets  all varieties  [] Wholly Guacamole  all varieties  [] Hummus  all varieties, chickpea + vegetable  [] Tofu Shirataki noodles    [] Tofu  all varieties  [] Tempeh  all varieties    PROTEIN  CHICKEN   [] Boneless, skinless breasts  [] Boneless, skinless thighs  [] Ground chicken breast, at least 93% lean  [] Chicken breast cutlet  [] Aidell's  Chicken Sausage + Chicken Meatballs    TURKEY   [] Turkey breast tenderloin   [] Ground turkey breast, at least 93% lean  [] Friona Naturals  Turkey Sausage    BEEF  [] Tenderloin  [] Sirloin  [] Top Loin  [] Flank Steak  [] Round Steak  [] Filet  [] Lean ground beef, at least 93% lean + grass-fed preferable    PORK  [] Tenderloin  [] Pork Chop  [] Center Cut  [] Friona Naturals  No-Sugar Luna    BISON  [] [place]  90 - 95% lean    SEAFOOD  [] All fresh fish + seafood; locally sourced when possible  [] Smoked salmon    HEAT + EAT ENTREES   [] Nikolai's Natural Foods  Chicken, Pork, Beef  [] Matthias  "All Natural" Grilled Chicken Breast + Strips, all varieties    SAUCES SPREADS + DIPS  [] Bitchin Sauce  Original, Chipotle, Cilantro [place]  [] Neri's Kitchen  Tzatziki Yogurt Dip, Babaganoush, Hummus  [] Wholly Guacamole  all varieties  [] Hummus  all varieties  [] Alsen Gringo Salsa  all varieties  [] Mrs. Maru's Salsa  all varieties  [] Stubb's All Natural BBQ Sauce  [] Primal Kitchen  Wilson, Ketchup, BBQ Sauce  [] Primal Kitchen Pasta Sauce  Roasted Garlic, Tomato Basil, no-dairy Vodka Sauce  [] Manuel & " Jeannie's  HeartSmart Pasta Sauce    DAIRY/DAIRY SUBSTITUTES/EGGS  EGGS   [] All eggs  cage-free, pasture-raise preferable  [] Crepini  egg wraps  [] Vital Farms  Pasture-Raised Egg Bites  [] JUST Egg [vegan]     CREAMERS   [] Califia  Better Half, original + vanilla unsweetened  [] NutPods  all varieties    MILK   [] Horizon Organic  all varieties except chocolate  [] Organic Valley  all varieties except chocolate  [] Organic Valley  ultra-filtered, reduced fat milk     PLANT_BASED MILK ALTERNATIVES  [] All unsweetened almond milks  original, vanilla + chocolate  [] Ripple  unsweetened   [] Milkadamia  original +_ vanilla, unsweetened   [] Forager  original + vanilla, unsweetened   [] Silk Organic  soy milk, unsweetened  [] Oatly  unsweetened  [] Califia  regular + protein-fortified oat milk, unsweetened     CHEESES  [] Regular or reduced fat cheeses  [] BelGioso  Fresh Mozzarella Snack Packs, Parmesan Power-full Snack   [] Goat cheese  [] Fresh mozzarella  [] String cheese  all varieties  [] Hillary Cottage Cheese  [] Anjelica's Cultured Cottage Cheese  [] Haritha Life 'Just Like Mozzarella'  plant-based shreds and other varieties  [] Parmela Creamery  plant-based shredded cheese    YOGURT  [] Fage  2% low-fat, plain  [] Siggi's  plain, vanilla  [] Chobani Greek  nonfat + whole milk yogurt, plain   [] Chobani Less Sugar  all flavored varieties   [] Oikos Greek  nonfat, plain  [] Two Good  all varieties   [] [facility] Provisions  plain  [] [place] Organic  low-fat + nonfat, plain  [] RedEssentia Health Farm  goat milk yogurt, plain  [] Kefit  unsweetened, plain  [] Forager  Greek style unsweetened, plain [dairy-free]  [] [place]  unsweetened Greek style, plain [dairy-free]  [] [facility]  almond milk yogurt, vanilla or plain, unsweetened [dairy-free]    FREEZER SECTION  FROZEN VEGGIES  [] All plain frozen veggies + greens [e.g. broccoli, brussels, carrots, okra, mushrooms, zucchini, yellow squash,  butternut squash, kale, spinach, yulia greens]  [] Riced veggies [e.g. cauliflower, broccoli, butternut squash]  [] Edamame  all varieties  [] Green Giant  [] Veggie Spirals  [] Marinated Veggies [e.g. eggplant, peppers, zucchini]  [] Simply Steam [place] Sprouts  [] Birds Eye  [] Power Blend Italian Style  lentils, broccoli, kale, zucchini  []  [place] Sprouts & Carrots  [] Oven Roasters Broccoli & Cauliflower  [] [place] Blend  [] Tattooed   [] Green Bean Blend  [] Farmer's Market Ratatouille  [] Butter Balsamic Glazed Vegetables  [] Riced Cauliflower & Quinoa Mediterranean Style  [] Bia's Good Life  Southern Style Greens [sauteed kale + onion]    FROZEN FRUITS  [] All unsweetened frozen fruits  all varieties  [] Dole Fruits & Veggie Blends  Berries 'n Kale  [] Dole Mix-ins  Triple Berry     FROZEN ENTREES  [] The Good Kitchen meals  all varieties [ e.g. Chili Lime Chicken Over Riced Cauliflower]  [] Premium Paleo  Not Syd Momma's Meatloaf  [] Primal Kitchen  Chicken Pesto + Steak Fajitas w/ Peppers & Onions  [] Eating Well Frozen Entrees  Butter Chicken Masala, Steak Carne Asada, Creamy Pesto Chicken, Chicken + Wild Rice Stroganoff, Yellow Falk Chicken, Sun-dried Tomato Chicken, Chicken Lo Mein  [] Realgood Entree Bowls  Ethiopian Inspired Beef Bowl over Riced Cauliflower, Chicken Burrito Bowl   [] Great Karma Coconut Falk  [] Christin's  Tamale Mihir with Black Beans, Vegetable Lasagna  [] Kashi Mayan [place] Bake  [] Healthy Choice  Simply Steamers Chicken Fried Rice  [] Basil Pesto Chicken & Christian Style Pork Power Bowls  [] Tattooed   Enchilada Bowl  [] Jeet Farms  Spicy Black Bean Burgers    FROZEN PIZZAS  [] Cauli'flour Foods  Cauliflower Pizza Crusts  [] Outer Aisle  Cauliflower Crust  [] Christin's  Veggie Crust Cheese Pizza  [] Quest Pizza     VEGETARIAN PRODUCTS  [] Beyond Meat  ground 'meat' + grilled 'chicken' strips  [] Tofurkey  Original Italian Sausage  + Original Tempeh  [] Gardein  Beefless Ground + Meatless Meatballs  [] Jeet Farms Grillers  Original Burger, Crumbles, Meatballs    ICE CREAMS + FROZEN DESSERTS  [] Halo Top  regular + keto series, pops  [] Rebel  ice cream + dessert sandwiches  [] Enlightened  ice cream + bars  [] Nightfood  ice cream  [] Realgood  ice cream  [] Arctic Zero Fit  frozen pint  [] The Frozen Farmer  sorbets  [] Wholly Rollies  Protein Balls, all varieties  [] Dream Pops  Coconut Latte    FROZEN BREAKFASTS  [] Realgood  Breakfast Sandwiches on Cauliflower Cheesy Bread  [] Rebel  ice cream + dessert sandwiches  [] Enlightened  ice cream + bars  [] Nightfood  ice cream  [] Realgood  ice cream  [] Arctic Zero Fit  frozen pint  [] The Frozen Farmer  sorbets  [] Wholly Rollies  Protein Balls, all varieties  [] Dream Pops  Coconut Latte    BREADS/BUNS/WRAPS  [] Eliseo Bread: All Types - In Freezer Section   [] Flat Out Light Wraps - All Varieties   [] Flat Out Protein Up Carb Down Flat Bread   [] Kontos Whole Wheat Pocket Emily   [] Santana CORTNEY 100% Whole Wheat Tortillas   [] LaTortAgrivi Factory Tortillas - Smart & Delicious; 50 or 80-calorie   [] Nature's Own 100% Whole Wheat Bread   [] Orowheat Healthful - 100% Whole Wheat Slice Bread and [place] Thins   [] Orowheat Healthful - Whole Wheat Nuts & Grain Bread; Flax & Seed Bread   [] Pepperidge Farm Natural Whole Grain 15 Bread   [] Pepperidge Farm Natural Whole Grain English Muffin - 100% Whole Wheat   [] Pepperidge Farm Very Thin 100% Whole Wheat   [] Blanca Cottrell 45 Calories and Delightful   [] Yuriy' 100% Whole Wheat Thin-Sliced Bagels and English Muffins   [] Western Bagel: Perfect 10     GLUTEN FREE  [] Paulino's Gluten Free Bread   [] [place] Bakehouse 7-Grain Gluten Free Bread     LEGUME PASTA   [] Explore Asian Organic Black Bean Spaghetti   [] Modern Table   [] Tolerant Foods       NUT BUTTERS & JELLIES    NUT BUTTERS   [] Better'n Chocolate: Coconut Chocolate  Peanut Butter Spread   [] Better'n Peanut Butter - All Types   [] Earth Balance Coconut and Peanut Spread   [] Venkatesh's Nut Shannon   [] MaraNatha: All Natural Roasted Cashew Butter - [place] or Creamy   [] MaraNatha: Roasted Peanut Butter   [] Nuts 'N More Peanut Shannon - All Flavors   [] PB2 Powder - Original or Chocolate   [] Skippy Natural - Creamy, Super Chunk   [] Smart Balance Peanut Butter - [place] or Creamy   [] Peanut Butter & Company:   [] Smooth , Crunch Time, The Heat Is On, Old Fashioned Smooth, Mighty Nut- Powdered Peanut Butter, Squeeze Pack   [] Smucker's Natural Peanut Butter - [place] or Creamy   [] Sunbutter Nut Butter   [] Wild Friends Protein Peanut Butter/[place] o Butter - Vanilla or Chocolate     JELLIES  o Polaner's All Fruit   o Clearly Organic Best Choice: Strawberry Fruit Spread       SNACKS    BARS  [] Kashi Bars - Chewy or Crunchy; Honey [place] o Flax or Peanut Butter   [] KIND Bars - 5 Grams of Sugar or Less   [] KIND Protein Bars - Strong and KIND   [] Nature Valley Protein Bar - All Varieties   [] Nature Valley Roasted Nut Crunch - [place] Crunch; Peanut Crunch   [] Nature Valley Simple Nut Bar - Roasted Peanut & Honey   [] Nature Valley Simple Nut Bar - [place], Cashew & Sea Salt   [] [facility] Nut [place] Bar - Salted Caramel Peanut   [] Think Thin Protein Bars   [] Quest Bars, Power Crunch Bars, Pure Protein Bars     BEEF JERKY - NITRATE FREE   [] Game On   [] Grass Run Farms   [] Krave   [] Ostrim   [] Perky Jerky   [] Primal Strips Meatless Vegan Jerky   [] [place]     CHIPS   [] Beanitos Chips   [] Fruit Crisps - e.g. Brother's-All-Natural, Bare Fruit, Yoga Chips   [] Radha's Soy Crisps: 1.3 ounce bag   [] Quest Protein Chips   [] Wasa Whole Wheat Crisp Bread     CRACKERS  [] Shanti's Gone Crackers   [] Nabisco Triscuit: Regular and Thin Crisp Crackers   [] Vans Say Cheese Crackers (G-F)     POPCORN/NUTS   [] Adriano Ochoa's Smart Pop Popcorn - Single Serving   []  100-Calorie Pack of Nuts - All Varieties     PROTEIN POWDERS & DRINKS  []  Protein -  Whey Protein Powder   [] Garden of Life Raw Protein Powder   [] Iconic Ready-To-Drink Protein Drink   [] Yang One Protein Powder   [] VegaSport Protein Powder     SALSA/HUMMUS/DIPS   [] Eat Well Embrace Life: Zesty Sriracha Carrot o Hummus   [] Pre-Portioned Guacamole Packs   [] Louis's   [] Tostitos Restaurant Style Salsa       SOUPS   [] Christin's Organic Soups - Lentil, Vegetable, Split Pea, Low-Sodium     CANNED GOODS   [] 100% Pure Pumpkin   [] BlueRunner Creole Cream-Style Red Beans or Navy Beans   [] Cajun Power Chicken Gumbo Base   [] Chicken of the [place]   [] Anshu Fresh Cut Sliced Beets   [] Hormel Breast of Chicken in Water   [] LeSuer Tender Baby Whole Carrots   [] Saurav Tabasco [place] Starter   [] Nahidist: Chunk Lite Tuna in Water, Gourmet Select Pouches   [] StarKist: Yellowfin Tuna Fillets   [] Trappey's: Kidney, Butter, Saeed, Black Eye, Field, and Black Beans   [] CAMI Han's Turnip Greens or Jus Spinach     CONDIMENTS/ SAUCES/SPREADS/ SPICES  [] Fredrick Dennis's Magic Seasonings - Regular or Salt Free   [] Holden Aguilar's Sauces - All Flavors   [] Laughing Cow Light - All Flavors   [] Dash Salt-Free Marinade - All Flavors   [] Manuel & Jeannie's: Heart Smart Pasta Sauces   [] Tabasco     SALAD DRESSINGS  [] Codie's Naturals: Lite Honey Mustard   [] Levy's Own: Lighten Up Salad Dressing - All Varieties   [] OPA Greek Yogurt Dressings - Ranch, Blue o Cheese, Caesar, Feta Dill     SWEETENERS  [] Sweet [place] Sweetener   [] Swerve   [] Truvia     BEVERAGES  [] Coconut Water   [] Crystal Light PURE - All Flavors   [] Honest Tea: Just Green Tea, Unsweetened   [] Kombucha Tea   [] [place]   [] [place] Sisters Bloody Shanti Mix   [] Metromint - Zero-Sugar; All Natural Flavored   [] Nicolas - Plain or Flavored   [] Celio Castillo   [] Steaz - Zero-Sugar, All-Natural, Sparkling Tea   [] Tea  Bags: Any Brand - e.g. Kirk, Yogi, Tazzo, Celestial   [] V8 100% Vegetable Juice   [] Vitamin Water Zero   [] Water   [] Zevia - Stevia Sweetened Soft Drink     BEER/KAUR/LIQUORS  []Mendez's Premier Light 64 Calories   [] Bud Select - 55 Calories   [] [place] Sisters Bloody Shanti Mix   [] Tamez Genuine Draft - 64 Calories   [] Red or White Wine - All Varieties     CEREALS: HOT/COLD   [] Casie's Maddieffin's Original Cereal  [] Narciso's Mill Oat Bran Hot Cereal - Cracked Wheat, Multi-Grain  [] Kashi GoLean Cereal  [] Kashi GoLean Hot Cereal packets - Vanilla; Honey Cinnamon  [] Elizabeth's Special K Protein Cereal  [] Coy's Steel Cut Della Oatmeal  [] Nature's Path Smart Bran  [] Baptist Instant Oatmeal packet, Original  [] Baptist Old Fashioned Baptist Oats  [] Uncle Josh's Whole Wheat & Flaxseed Original Cereal

## 2023-10-12 NOTE — PROGRESS NOTES
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 female.    Chief Complaint: Establish Care (Weight loss)    Pt lost 70+ lb 7 years ago on Optavia. She kept off for 5 lb. Took a break last year for Thanksgiving and has since gained 20-30lb. Feels better when weight less. Appetite is increased. She has tried WW in the past/unsuccessful. No medications tried in the past.   She is considering a medication.     Has seen dietitian through [facility]. Discussed general principles   Brother recently diagnosed with DM II.    Asthma is stable. Likes to hike. Will need if is present.   Gained 10 lb recently in [place].     Pt compliant with bp and chol medications.     Food recall:  Bfast: optavia mashed potatoes, sour cream, egg white  Lunch: sandwich, drink  Dinner:  pasta, meat, veggie  Regular pasta  Eating larger servings of pasta  Snack: have crept in--variety  Eats too much when snacks  Individual bags  Water: adequate  Sugar Sweetened beverages: none  Etoh: rare    Enjoys fruit      HPI  Review of Systems   Constitutional:  Negative for activity change, appetite change, fatigue and fever.   HENT:  Negative for mouth dryness and goiter.    Eyes:  Negative for visual disturbance.   Respiratory:  Negative for apnea, cough, chest tightness and shortness of  breath.    Cardiovascular:  Negative for chest pain, palpitations and leg swelling.   Gastrointestinal:  Negative for abdominal pain, constipation and diarrhea.   Endocrine: Negative for cold intolerance, heat intolerance, polydipsia, polyphagia and polyuria.   Genitourinary:  Negative for frequency and menstrual problem.   Musculoskeletal:  Negative for arthralgias and myalgias.   Integumentary:  Negative for color change and rash.   Psychiatric/Behavioral:  Negative for sleep disturbance. The patient is not nervous/anxious.           Objective     Vitals:    10/12/23 0743   BP: 114/80   Pulse: 78   Resp: 18   Temp: 98 °F (36.7 °C)     Wt Readings from Last 10 Encounters:   10/12/23 82.3 kg (181 lb 7 oz)   08/09/23 80 kg (176 lb 5.9 oz)   07/06/23 74.8 kg (165 lb)   07/03/23 76.1 kg (167 lb 12.3 oz)   05/29/23 78.2 kg (172 lb 6.4 oz)   03/21/23 74.8 kg (165 lb)   09/29/22 73.9 kg (163 lb)   08/11/22 75.4 kg (166 lb 3.6 oz)   07/06/22 76.5 kg (168 lb 10.4 oz)   05/18/22 74.6 kg (164 lb 7.4 oz)       Physical Exam  Vitals and nursing note reviewed.   Constitutional:       General: She is not in acute distress.  HENT:      Head: Normocephalic and atraumatic.   Eyes:      General: No scleral icterus.     Pupils: Pupils are equal, round, and reactive to light.   Neck:      Comments: No TM  Cardiovascular:      Rate and Rhythm: Normal rate and regular rhythm.      Pulses: Normal pulses.      Heart sounds: Normal heart sounds. No murmur heard.     No friction rub. No gallop.   Pulmonary:      Effort: Pulmonary effort is normal. No respiratory distress.      Breath sounds: Normal breath sounds. No wheezing, rhonchi or rales.   Abdominal:      General: Bowel sounds are normal. There is no distension.      Palpations: Abdomen is soft.      Tenderness: There is no abdominal tenderness.   Musculoskeletal:         General: No swelling.      Cervical back: Normal range of motion and neck supple. No tenderness.   Lymphadenopathy:       Cervical: No cervical adenopathy.   Skin:     General: Skin is warm.      Findings: No erythema or rash.   Neurological:      Mental Status: She is alert and oriented to person, place, and time.   Psychiatric:         Mood and Affect: Mood normal.         Behavior: Behavior normal.         PAST MEDICAL HISTORY:  Past Medical History:   Diagnosis Date    Asthma     Osteoporosis          PAST SURGICAL HISTORY:  Past Surgical History:   Procedure Laterality Date    FINGER FRACTURE SURGERY Left     5th finger    HYSTERECTOMY      PARTIAL HYSTERECTOMY         FAMILY HISTORY:  Family History   Problem Relation Age of Onset    Hypertension Mother     Hypertension Father     Hypertension Sister     Breast cancer Neg Hx     Colon cancer Neg Hx     Ovarian cancer Neg Hx           SOCIAL HISTORY:  Social History     Social History Narrative    Not on file       MEDICATIONS:  Medications have been reviewed.    ALLERGIES:  Allergies have been reviewed.       Assessment and Plan       ICD-10-CM ICD-9-CM   1. Weight gain  R63.5 783.1   2. Overweight (BMI 25.0-29.9)  E66.3 278.02   3. Essential hypertension  I10 401.9   4. Mixed hyperlipidemia  E78.2 272.2   5. Age-related osteoporosis, improved to osteopenia with alendronate  M81.0 733.01   6. History of obesity  Z86.39 V12.29   7. Mild intermittent asthma without complication  J45.20 493.90        Weight gain    Overweight (BMI 25.0-29.9)  -     Ambulatory referral/consult to [facility]  -     Ambulatory Referral/Consult to Nutrition Services - [facility]; Future; Expected date: 10/19/2023  Extension dw pt all available AOMs including lomaira, qsymia, wegovy, contrave.   May consider lomaira-she will send [employer] message.  Reviewed risks/benefits/se's. She has no hx of bipolar or subs use/abuse.   NO CV or CAD.     Essential hypertension  -     Ambulatory Referral/Consult to Nutrition Services - [facility]; Future; Expected date:  10/19/2023  Chronic/stable  Continue meds    Mixed hyperlipidemia  -     Ambulatory Referral/Consult to Nutrition Services - [facility]; Future; Expected date: 10/19/2023  Chronic/stable    Age-related osteoporosis, improved to osteopenia with alendronate  Comments:  chronic/stable    History of obesity  Placed referral for nutrition; basic principles and alternate food options dw her as well as protein/added sugar goals.    Mild intermittent asthma without complication  -     PROAIR HFA 90 mcg/actuation inhaler; Inhale 2 puffs into the lungs every 4 (four) hours as needed for Wheezing.  Dispense: 18 g; Refill: 2           Consider emng [place]    Inbody:   Interpreted and dw p  Smm 56.4  Visc fat 17  Bmr 1391    Risks/benefits/common side effects of medication discussed with patient at length. UTD patient handout given. (mychart msg)  D/W pt at length potential pharmacologic options; she desires consideration of ozempic. Risks/benefits/common side effects of ozempic d/w pt including nausea and pain at injection site; she is aware should not get pregnant while taking and not approved while breastfeeding. NO personal/fam hx of MEN or medullary thyroid cancer. No hx of pancreatitis. Instructed pt how to use with demo pen; pt practiced in office. Pt advised if approved will get pt handout from pharmacy. Pt has no hx of thyroid nodules or biliary disease/gallstones. DW pt with substantial or rapid weight loss gallstones could develop. Also dw pt glp-1 MOA and common side effects.     45+ m in total with pt + additional chart review

## 2023-10-13 ENCOUNTER — PATIENT MESSAGE (OUTPATIENT)
Dept: PRIMARY CARE CLINIC | Facility: CLINIC | Age: 58
End: 2023-10-13
Payer: COMMERCIAL

## 2023-10-13 DIAGNOSIS — E66.3 OVERWEIGHT (BMI 25.0-29.9): Primary | ICD-10-CM

## 2023-10-13 DIAGNOSIS — I10 ESSENTIAL HYPERTENSION: ICD-10-CM

## 2023-10-13 DIAGNOSIS — E78.2 MIXED HYPERLIPIDEMIA: ICD-10-CM

## 2023-10-13 DIAGNOSIS — J45.20 MILD INTERMITTENT ASTHMA WITHOUT COMPLICATION: Primary | ICD-10-CM

## 2023-10-13 RX ORDER — ALBUTEROL SULFATE 90 UG/1
2 AEROSOL, METERED RESPIRATORY (INHALATION) EVERY 6 HOURS PRN
Qty: 18 G | Refills: 1 | Status: SHIPPED | OUTPATIENT
Start: 2023-10-13 | End: 2023-10-16 | Stop reason: SDUPTHER

## 2023-10-16 ENCOUNTER — OFFICE VISIT (OUTPATIENT)
Dept: INTERNAL MEDICINE | Facility: CLINIC | Age: 58
End: 2023-10-16
Payer: COMMERCIAL

## 2023-10-16 VITALS
OXYGEN SATURATION: 98 % | BODY MASS INDEX: 28.95 KG/M2 | DIASTOLIC BLOOD PRESSURE: 60 MMHG | HEIGHT: 66 IN | HEART RATE: 86 BPM | WEIGHT: 180.13 LBS | SYSTOLIC BLOOD PRESSURE: 118 MMHG | TEMPERATURE: 97 F

## 2023-10-16 DIAGNOSIS — J45.21 MILD INTERMITTENT ASTHMA WITH ACUTE EXACERBATION: Primary | ICD-10-CM

## 2023-10-16 DIAGNOSIS — J01.10 ACUTE NON-RECURRENT FRONTAL SINUSITIS: ICD-10-CM

## 2023-10-16 PROCEDURE — 99214 PR OFFICE/OUTPT VISIT, EST, LEVL IV, 30-39 MIN: ICD-10-PCS | Mod: S$GLB,,, | Performed by: FAMILY MEDICINE

## 2023-10-16 PROCEDURE — 99999 PR PBB SHADOW E&M-EST. PATIENT-LVL IV: ICD-10-PCS | Mod: PBBFAC,,, | Performed by: FAMILY MEDICINE

## 2023-10-16 PROCEDURE — 99999 PR PBB SHADOW E&M-EST. PATIENT-LVL IV: CPT | Mod: PBBFAC,,, | Performed by: FAMILY MEDICINE

## 2023-10-16 PROCEDURE — 99214 OFFICE O/P EST MOD 30 MIN: CPT | Mod: S$GLB,,, | Performed by: FAMILY MEDICINE

## 2023-10-16 RX ORDER — PREDNISONE 5 MG/1
TABLET ORAL
Qty: 30 TABLET | Refills: 0 | Status: SHIPPED | OUTPATIENT
Start: 2023-10-16 | End: 2023-10-27

## 2023-10-16 RX ORDER — AMOXICILLIN AND CLAVULANATE POTASSIUM 875; 125 MG/1; MG/1
1 TABLET, FILM COATED ORAL 2 TIMES DAILY
Qty: 20 TABLET | Refills: 0 | Status: SHIPPED | OUTPATIENT
Start: 2023-10-16 | End: 2023-10-26

## 2023-10-16 RX ORDER — ALBUTEROL SULFATE 90 UG/1
2 AEROSOL, METERED RESPIRATORY (INHALATION) EVERY 6 HOURS PRN
Qty: 18 G | Refills: 5 | Status: SHIPPED | OUTPATIENT
Start: 2023-10-16 | End: 2024-02-04 | Stop reason: SDUPTHER

## 2023-10-16 NOTE — PROGRESS NOTES
OFFICE VISIT 10/16/23  4:40 PM CDT    CHIEF COMPLAINT: Sinus problem    She reports a history of sinus pressure and discomfort in the distribution of the frontal sinuses bilaterally, lasting for more than two weeks, associated with thick sinus drainage and nasal congestion. She also describes an increasing cough with an increased need for albuterol to treat her asthma symptoms. No fever, hemoptysis, or chest pain reported.      1. Mild intermittent asthma with acute exacerbation  Assessment & Plan:  This is a chronic problem, with exacerbation or progression.    Orders:  -     albuterol (PROVENTIL/VENTOLIN HFA) 90 mcg/actuation inhaler; Inhale 2 puffs into the lungs every 6 (six) hours as needed for Wheezing. Rescue  Dispense: 18 g; Refill: 5  -     inhalation spacing device; Use as directed when taking inhaled medicine  Dispense: 1 each; Refill: 0  -     predniSONE (DELTASONE) 5 MG tablet; Take 4 tablets (20 mg total) by mouth once daily for 3 days, THEN 3 tablets (15 mg total) once daily for 3 days, THEN 2 tablets (10 mg total) once daily for 3 days, THEN 1 tablet (5 mg total) once daily for 3 days.  Dispense: 30 tablet; Refill: 0    2. Acute non-recurrent frontal sinusitis  -     amoxicillin-clavulanate 875-125mg (AUGMENTIN) 875-125 mg per tablet; Take 1 tablet by mouth 2 (two) times daily. for 10 days  Dispense: 20 tablet; Refill: 0    Unless noted herein, any chronic conditions are represented as and appear stable, and no other significant complaints or concerns were reported.    Follow up if symptoms worsen or fail to improve.       Review of Systems   Constitutional:  Negative for chills and fever.   HENT:  Positive for postnasal drip, rhinorrhea, sinus pressure/congestion and sore throat. Negative for ear pain.    Respiratory:  Positive for cough and wheezing. Negative for shortness of breath.    Cardiovascular:  Negative for chest pain.   Musculoskeletal:  Negative for myalgias.   Integumentary:  Negative  "for rash.   Allergic/Immunologic: Negative for environmental allergies.   Neurological:  Positive for headaches.       Vitals:    10/16/23 1608   BP: 118/60   BP Location: Left arm   Patient Position: Sitting   BP Method: Large (Manual)   Pulse: 86   Temp: 97.3 °F (36.3 °C)   TempSrc: Tympanic   SpO2: 98%   Weight: 81.7 kg (180 lb 1.9 oz)   Height: 5' 5.98" (1.676 m)   Physical Exam  Vitals reviewed.   Constitutional:       General: She is not in acute distress.     Appearance: Normal appearance. She is not ill-appearing, toxic-appearing or diaphoretic.   HENT:      Right Ear: Tympanic membrane, ear canal and external ear normal. There is no impacted cerumen.      Left Ear: Tympanic membrane, ear canal and external ear normal. There is no impacted cerumen.      Nose: Congestion and rhinorrhea present.      Right Sinus: Frontal sinus tenderness present.      Left Sinus: Frontal sinus tenderness present.      Mouth/Throat:      Mouth: Mucous membranes are moist.      Pharynx: Oropharynx is clear. No oropharyngeal exudate or posterior oropharyngeal erythema.   Eyes:      General:         Right eye: No discharge.         Left eye: No discharge.   Cardiovascular:      Rate and Rhythm: Normal rate and regular rhythm.      Heart sounds: Normal heart sounds.   Pulmonary:      Effort: Pulmonary effort is normal.      Breath sounds: Normal breath sounds.   Skin:     General: Skin is warm and dry.   Neurological:      Mental Status: She is alert and oriented to person, place, and time. Mental status is at baseline.   Psychiatric:         Behavior: Behavior normal.       Documentation entered by me for this encounter may have been done in part using speech-recognition technology. Although I have made an effort to ensure accuracy, "sound like" errors may exist and should be interpreted in context.  "

## 2023-11-06 NOTE — PROGRESS NOTES
"Nutrition Assessment    Visit Type: Insurance initial  Session Time:  2 Hours  Reason for MNT visit: Pt in for education and nutrition counseling regarding HTN and Weight Management .     Age: 58 y.o.  Wt:   Wt Readings from Last 10 Encounters:   11/07/23 80.5 kg (177 lb 7.5 oz)   10/16/23 81.7 kg (180 lb 1.9 oz)   10/12/23 82.3 kg (181 lb 7 oz)   08/09/23 80 kg (176 lb 5.9 oz)   07/06/23 74.8 kg (165 lb)   07/03/23 76.1 kg (167 lb 12.3 oz)   05/29/23 78.2 kg (172 lb 6.4 oz)   03/21/23 74.8 kg (165 lb)   09/29/22 73.9 kg (163 lb)   08/11/22 75.4 kg (166 lb 3.6 oz)     Ht:   Ht Readings from Last 1 Encounters:   10/16/23 5' 5.98" (1.676 m)     BMI:   BMI Readings from Last 5 Encounters:   10/16/23 29.09 kg/m²   10/12/23 29.28 kg/m²   08/09/23 28.47 kg/m²   07/06/23 26.63 kg/m²   07/03/23 27.08 kg/m²     Client states:  She lost 70+ pounds on Optavia, kept weight off for 5 years +/- 5 pounds. Has recently gained back 20-30 pounds over past two years. She tried to do Optavia again, but found it too difficult to follow. Does not want to eat every 3ish hours, prefers 3 meals with lunch being the largest meal. Is looking for a sustainable diet that allows flexibility for fun foods. Does not want to gain any additional weight back. Weight loss goal is 140 pounds.   Inbody done 10/12/23: SMM: 56.4 Visceral Fat: 17 BRM 1391  Discussed:  Goal for breakfast ~ 300 calories and 20 grams of protein  Non-animal protein sources  Getting adequate protein + fiber   Proper portion sizes of starches such as fruit, pasta, and rice  Importance of resistance training  Inbody done ~ every 6 weeks     Medical History  Problem List             Resolved    Mild intermittent asthma without complication (Chronic)         Mixed hyperlipidemia (Chronic)         Essential hypertension (Chronic)         Lichen sclerosus         Age-related osteoporosis, improved to osteopenia with alendronate (Chronic)         Overweight (BMI 25.0-29.9)         " Prominent abdominal aortic pulsation         History of obesity            Past Medical History:   Diagnosis Date    Asthma     Osteoporosis        Past Surgical History:   Procedure Laterality Date    FINGER FRACTURE SURGERY Left     5th finger    HYSTERECTOMY      PARTIAL HYSTERECTOMY          Medications    Prior to Admission medications    Medication Sig Start Date End Date Taking? Authorizing Provider   albuterol (PROVENTIL/VENTOLIN HFA) 90 mcg/actuation inhaler Inhale 2 puffs into the lungs every 6 (six) hours as needed for Wheezing. Rescue 10/16/23 10/15/24  COY Epps MD   alendronate (FOSAMAX) 70 MG tablet Take 1 tablet (70 mg total) by mouth every 7 days. 7/3/23   Zaida Rangel PA-C   aspirin (ECOTRIN) 81 MG EC tablet Take 81 mg by mouth once daily.    Provider, Historical   Ca-D3-mag ox-zinc--rodney-bor 600 mg calcium- 20 mcg-50 mg Tab  9/1/20   Provider, Historical   hydroCHLOROthiazide (HYDRODIURIL) 12.5 MG Tab Take 12.5 mg by mouth. 4/10/23   Provider, Historical   inhalation spacing device Use as directed when taking inhaled medicine 10/16/23   COY Epps MD   mv-mn/iron/folic acid/herb 190 (VITAMIN D3 COMPLETE ORAL) Take by mouth.    Provider, Historical   phentermine (LOMAIRA) 8 mg Tab Take 1 po daily -bid prn appetite 10/13/23   Leticia Zhang MD   pravastatin (PRAVACHOL) 80 MG tablet  6/20/20   Provider, Historical   ramipril (ALTACE) 10 MG capsule TK ONE C PO  ONCE D 5/23/19   Provider, Historical     Vitamins, Minerals, and/or Supplements:  Over 50 MVI, Vitamin D with food     Social History    Marital status:      Social History     Tobacco Use    Smoking status: Never    Smokeless tobacco: Never   Substance Use Topics    Alcohol use: Yes     Comment: socially       Labs   Reviewed and noted      LIFESTYLE FACTORS    Dining out: 1-2 x per week, mostly restaurants  Types of restaurants/foods: varies    Frequency of consumption of fried foods: rare    Meal  preparation/shopping: self and , do not cook very much    Sleep: fair  Hours: 6-8  Wake: 7 am  Sleep: 9 pm    Stress Level: moderate  Stress management: not discussed    Support System:  spouse    Physical Activity: Sedentary (little or no exercise)  Types of activity: none, contemplating joining [employer] and getting a   Frequency of activity: n/a    Food Allergies or Intolerances:  NKFAI      Beverages  Water: 64 ounces or more per day  Alcohol: rare, does like very sweet drinks  Coffee: none  Energy Drinks: none  Tea: none  Soda: 2 cans diet coke per day  Milk: skim milk  Juice: none  Other: none    24-hour Recall    Breakfast: by 8 am: greek yogurt + granola + berries // special k red berries or oat crunch + milk // cream cheese on rice cake // if very hungry will add egg whites with or without cheese  Lunch: biggest meal of the day around 1130/12 pm: grilled chicken + steamed veggies // optavia potatoes (~130 kcal) // salad with bryant/cucumber/cauliflower/bell pepper/ skinny girl dressing, light [place]ish dressing, or avocado maría dressing sometimes adds sunflower seeds or grilled chicken for protein  Dinner: lighter meal around 5-6 pm: skips // pizza // cottage cheese & berries // smaller version of lunch  Snacks: Does not usually snack      Diagnosis    Inadequate protein intake related to consumption of low protein foods and skipping meals as evidenced by 24-hour food recall.    Intervention    Estimated Energy Requirements:   Calories: 1550  Protein: 116 grams   Carbohydrates: 155 grams   Fat: 52 grams   Baseline for fluids: 100 ounces + sweat loss    Written Materials Provided  Meal Planning Guide with recommendations  Fueling Well On-the-Go Food Guide  Healthy Eating Plate  Eat Fit Shopping Guide  RD contact information    Goals:  1.  Eat at least 20 grams of protein at breakfast  2.  Follow healthy plate while traveling: fill 1/2 plate with non-starchy vegetables, 1/4 lean protein,  1/4 carbohydrates   3.  Add in resistance training at least 2x per week  4.  Get at least 100 grams of protein per day      Monitoring/Evaluation    Monitor the following:  Weight  Sleep  Stress Management  Movement    Communicated with healthcare provider and documented plan for referral to appropriate agency/healthcare provider as needed    Supervising Physician: Leticia Zhang MD    Patient motivation, anticipated barriers, expected compliance: Patient is motivated and has verbalized understanding and intent to comply.     Comprehension: fair     Follow-up: 8 weeks

## 2023-11-07 ENCOUNTER — NUTRITION (OUTPATIENT)
Dept: PRIMARY CARE CLINIC | Facility: CLINIC | Age: 58
End: 2023-11-07
Payer: COMMERCIAL

## 2023-11-07 VITALS — WEIGHT: 177.5 LBS | BODY MASS INDEX: 28.66 KG/M2

## 2023-11-07 DIAGNOSIS — E66.3 OVERWEIGHT (BMI 25.0-29.9): ICD-10-CM

## 2023-11-07 DIAGNOSIS — E78.2 MIXED HYPERLIPIDEMIA: Chronic | ICD-10-CM

## 2023-11-07 DIAGNOSIS — I10 ESSENTIAL HYPERTENSION: Chronic | ICD-10-CM

## 2023-11-07 PROCEDURE — 99999 PR PBB SHADOW E&M-EST. PATIENT-LVL II: CPT | Mod: PBBFAC,,, | Performed by: DIETITIAN, REGISTERED

## 2023-11-07 PROCEDURE — 97802 PR MED NUTR THER, 1ST, INDIV, EA 15 MIN: ICD-10-PCS | Mod: S$GLB,,, | Performed by: DIETITIAN, REGISTERED

## 2023-11-07 PROCEDURE — 99999 PR PBB SHADOW E&M-EST. PATIENT-LVL II: ICD-10-PCS | Mod: PBBFAC,,, | Performed by: DIETITIAN, REGISTERED

## 2023-11-07 PROCEDURE — 97802 MEDICAL NUTRITION INDIV IN: CPT | Mod: S$GLB,,, | Performed by: DIETITIAN, REGISTERED

## 2023-11-07 NOTE — PATIENT INSTRUCTIONS
Name: Linette Schmidt  Date: 11/7/23  NUTRITION RECOMMENDATIONS    Action Items:  1.  Eat at least 20 grams of protein at breakfast  2.  Follow healthy plate while traveling: fill 1/2 plate with non-starchy vegetables, 1/4 lean protein, 1/4 carbohydrates   3.  Add in resistance training at least 2x per week  4.  Get at least 100 grams of protein per day    Additional Recommendations  Limit added sugars to < 25g per day.  Hydration: At least ½ your body weight in ounces per day + an additional 20 ounces for each hour of activity or sweat loss  Exercise [place] and website recommendations:  Street Parking - have an iPhone [place] or can use website, typically <30 minutes and higher intensity  [employer]  - [place]  Caliber Strong  - [place] traditional weightlifting programming  Moves  - [place]  [employer]  - [place]  YouTube:  [employer]   Yoga with Dari Aly   Body Project   Move with Rea  The serving sizes and macros listed below are guidelines. They may not match up to each other exactly but are in very similar. I would highly recommend utilizing the Meal Planning Guide for measuring out servings of foods the first couple of time you make an item to see what your proper portion size is then you can eyeball it.  The more you plan out what your meals are the easier it will be. A calorie tracking [place] like Cronometer or IOCSnessPal can be great. You can type in the ingredients you want to use to build your meal and see if it fits the calorie ranges I am suggesting. It also helps you figure out how to adjust menus to fit your goals.   Utilize items such as frozen/canned fruits and vegetables to make cooking easier  Look for pre-cooked or pre-marinaded meats for convenience as well.  Get creative with rotisserie chicken, the opportunities are endless  Tacos, BBQ sandwiches, Loaded baked potatoes, Pasta, Pizza   Use your hand to estimate portion sizes: 1-2 palms of protein, 1  closed fist of carbs, at least 2 closed fists of non-starchy vegetables  Macronutrient Servings Totals  Refer to the Meal Planning Guide for serving sizes  Calories Protein Carb Fat   1550 Servings Servings Servings    12 8 5    Grams Grams Grams    [phone]       Breakfast Options  About 300-400 calories and at least 20g of protein    Option 1:  408 calories, 32g protein, 34g carbohydrate, 16g fat  ½ cup egg whites/beaters scrambled  2 Veggies Made Great cinnamon roll muffins  3 Gita Chicken breakfast sausage links  1 cup fresh strawberries  Option 2:  311 calories, 29g protein, 42g carbohydrate, 3g fat  Yogurt Parfait  2/3 - 1 cup Oikos Triple Zero Greek yogurt, any flavor  1 cup mixed berries  ½ cup low sugar cereal (see brand recommendations at end of document)  Option 3:  393 calories, 38g protein, 40g carbohydrate, 9g fat  High Protein Overnight Oats    Option 4:  300 calories, 21g protein, 18g carbohydrate, 16g fat  Meal Prep Breakfast Burritos    Option 5:  345 calories, 42g protein, 24g carbohydrate, 9g fat  Basic Protein Smoothie  1 scoop protein powder  1 cup frozen fruit  ½ cup frozen cauliflower rice  8 ounces Fairlife milk  1 tablespoon makenna seeds  Optional add-ins: peanut butter or PB2, ground flax seed, coconut flakes, sugar-free pudding mix, handful baby spinach, ½ cup pickled beets  Option 6:  386 calories, 23g protein, 35g carbohydrate, 16g fat  Breakfast [place]  Whole wheat English muffin  1 egg  2 slices center cut head  1 slice [place] Ultra-Thin Sliced Boris Sahil  1 cup cantaloupe    Option :  394 calories, 30g protein, 46g carbohydrate, 10g fat  Protein Hot Oatmeal Breakfast  2 light babybel cheese  1 mandarin  1 tablespoon 50% less sugar craisins  ½ cup oatmeal cooked with water or unsweetened milk of choice  ½ scoop protein powder    Lunch Options  About 500-600 calories and at least 40g of protein    Option 1:  varies  Building A Better-For-You Salad   Start with a base of fresh  greens: arugula, spinach, butter lettuce, mixed greens, kale, bryant  Add at least 2-3 veggies  Raw veggies add a nice crunch  Grilled or roasted veggies add a nice charred, caramelized flavor  Pickled veggies are great for adding a hint of sweet/sour flavor, while fermented veggies give you added probiotic benefits  Add a lean protein (4-6 ounces)  Grilled chicken, turkey, tuna/salmon packet, shrimp, tofu  Add high fiber carbs  Beans & quinoa   Grains: whole grain, chickpea pasta, farro, bulgar  Starchy veggies: potatoes, winter squash, corn  Fruit: berries, grapes, chopped apples, etc.  Add texture + flavor: choose 1-2, up to ~ 2 tablespoons of each item  Avocado  Cheese  Hummus   Nuts   seeds  Dress it up  Choose light/low-fat options: Lockett's Own Light dressings and Unique Home Designs Farms are great options  Make your own: oil + vinegar + seasonings + a little bit of Faheem mustard  Homemade Salad Dressing Recipes    Option 2:  388 calories, 37g protein, 24g carbohydrate, 16g fat  Build a better sandwich:   2 slices Blanca Simba Delightful Wheat Bread (or other whole grain, thin sliced bread)   3-4 ounces deli meat  1 sliced reduced-fat cheese or ¼ sliced avocado  unlimited non-starchy vegetables such as lettuce, tomato, onion, pickle, etc.  1 tablespoons sauce (jackson, Ranch, honey mustard, BBQ sauce, hummus), unlimited yellow or brown mustard  1 cup of raw non-starchy vegetables (baby carrots, sugar snap peas, cucumbers, etc.) + dip of choice or ½ cup cooked non-starchy vegetables  Option 3:  Snack Box Lunch 1  470 calories, 38g protein, 55g carbs, 19g fat  3 ounces deli meat  2 light string cheese sticks  1 cup grapes  15 almonds  Fiber One brownie Bar  Snack Box Lunch 2  560 calories, 42g protein, 47g carbs, 23g fat  1 serving Nut Thins  1 can tuna packed in water mixed with 1 chopped pickle spear and 2 tbsp Olive Oil Jackson  1 apple sliced  1 ounce cheddar cheese    Dinner Options  About 400-500 calories and 30-40g of  protein    Option 1:  Varies: ~ 400 calories, 31-49g protein, 35g carbohydrate, 10g fat  3-5 ounces of baked or broiled fish, seafood, or lean meat cooked with spray olive oil  1 cup or more of cooked non-starchy vegetables  1 serving of 100% Whole Wheat or 1-2 servings of carbohydrates from the Meal Planning Guide Book  Option 2:  Varies: made with 1 cup quinoa, 4 ounce sirloin steak, 1 cup chopped broccoli, and 1 tablespoon Lemon Basil Vinaigrette 500 calories, 46g protein, 42g carbohydrate, 16g fat  Build a grain/power bowl:   Choose a starch (1-2 servings feel free to mix/match)  Brown rice, farro, quinoa, millet, barley, couscous  White or sweet potatoes  Chinchilla squash  Choose a protein (3-4 ounces)  [place], steak (lean cut), chicken, shrimp, edamame, tofu  Vegetables (unlimited amounts)  Try them roasted, steamed, or grilled for variety  Eggplant, broccoli, carrots, bell peppers, green beans, radishes, cucumbers, parsnips, lettuce, cabbage  Dressing/sauce  Vinaigrette, yogurt, warm broth, hot sauce, soy sauce, chimichurri, pesto, or any combinations!  Garnish (optional)  Fresh herbs, micro greens, nuts, seeds, toasted coconut  Note that nuts/seeds/coconut contain a lot of calories and should be used sparingly  Option 3:  433 calories, 42g protein, 10g carbohydrate, 25g fat  Tacos  1-2 carb smart tortillas  ¼ cup cheese of choice  2 tbsp sour cream  3-4 ounces lean (93/7) ground beef or turkey or rotisserie chicken  Option 4:  412 calories, 35g protein, 41g carbohydrate, 12g fat  Better-for-you pizza  2-Ingredient High Protein Dough, (or use a whole wheat ashkan) + ¼ cup red sauce + 1/3 cup cheese + 2-4 ounces of protein (rotisserie chicken, turkey pepperoni, sliced grilled chicken/steak) + veggies of choice  Serve with at least 1 cup of non-starchy vegetables or side salad  Option 5:  Varies, made with chicken sausage and marinara and steamed broccoli  433 calories, 35g protein, 44g carbohydrate, 13g  fat  Better-for-you pasta  1 cup cooked Banza/Whole Wheat pasta + 2-4 ounces lean protein + ¼ cup toni sauce or ½ cup red sauce or 1-2 tablespoons pesto   Serve with 1-2 cups non-starchy vegetables    Snack Ideas  Create a hunger crushing combo with protein, fiber, and fat! Choose two of the three to create a satisfying and filling snack any time of the day!  Oikos Pro 20g Greek Yogurt + piece of fruit  1 portion bag of nuts + piece of fruit  Piece of fruit + 2 tablespoons no sugar added nut butter  Venkatesh's and Skippy make on-the-go pre-portioned packets  Protein bar  Look for at least 15 grams of protein  Limit to 1 serving carbohydrates (15 grams)  Ready to drink protein shake   At least 25 grams protein  Bag of Skinny Pop or Smart Food popcorn + 1 low-fat cheese stick/babybel   ½ cup cottage cheese + ¼ cup freeze dried fruit  Any smoothie under 300 calories and 15+ grams of protein  Turkey rollup: 1 carb smart tortilla + 3 oz turkey + 1 tablespoon jackson  3 oz can or packet of tuna + 1 tablespoon jackson with 1 serving of crackers of your choice  P3 Protein Pack  Bare Apple Chips + 2 low-fat string cheese  1-2 cups raw vegetables + 1 portion container guacamole or 2-3 tbsp Greek yogurt ranch or 2-3 tbsp hummus  1 serving Roman Catholic Rice Crisps or Beanitos + ¼ cup salsa and 1 portion container guacamole  1-2 Chomps Sticks + 1 piece fruit  Sweet Treats  Protein shake  ½ cup berries + a dollop of whipped cream  Outshine Frozen Fruit Bars (2-3 if wanted)  Chocolate Dipped Banana Bites  Raspberry Lemon Greek Frozen Yogurt Bark  1 cup strawberries with 2-3 tbsp chocolate hummus  Any smoothie under 200 calories  Sugar Free Pudding or Jello  5.3 ounce 0% Fage yogurt mixed with 2 tablespoons sugar-free pudding mix  All the Best Banana Nice Cream Recipes   Any sweet treat under 200 calories (think mini/fun-sized)    Favorite Websites for Recipe Inspiration  Skinnytaste  Real Food Dietitians   Eating Well  Budget Bytes  Eating  Bird Food  Fit Foodie Finds  Oh Snap Macros   35 Macro Friendly Meal Prep Recipes   9 Meal Prep Success Tips       Additional Recipe Ideas:  Breakfast  Oats Overnight + 6 ounces Fairlife milk   Breakfast Toast Ideas + Protein Shake  Meal Prep Breakfast Options:  [place] Chicken Egg Cups  Ham & Broccoli Breakfast Casserole  Bagel Ham and Cheese Quiche  Blueberry Protein Pancakes  English Muffin Breakfast Bake     Raspberry Fiber Smoothie (makes 1 smoothie)  1 cup frozen raspberries  1 cup unsweetened almond milk  2/3 cup nonfat vanilla Greek yogurt (or 5.3 oz cup) or 1 scoop protein powder  ½ cup frozen cauliflower (florets or riced)  1 tbsp almond butter  1 tbsp old fashioned oats  1 tbsp makenna seeds  ½ tbsp honey  PB&J Smoothie Bowl  ¼ cup milk of your choice  2/3 cup frozen blueberries  2/3 cup sliced strawberries, frozen  1 scoop vanilla protein powder  1 tbsp peanut butter  Optional toppings: 1 tbsp melted peanut butter, 1-2 tbsp granola, makenna seeds, blue berries  Spinach Avocado Smoothie  1 cup nonfat plain Greek yogurt  1 cup fresh spinach  1 frozen banana  ¼ avocado  2 tbsp water  1 tsp honey  Lunch/Dinner  Chicken Enchiladas - great for freezing serving with a side salad or at least 1 cup non-starchy vegetables  Ground Turkey Skillet with Zucchini, Corn, Black Beans, and Tomato great by itself or serve over 1 cup cauliflower rice or 1/3 cup brown rice    Air Fryer Chicken Thighs with Air Fryer Frozen Broccoli & 1 cup cubed roasted potatoes  BBQ Grilled chicken breast with Brown Sugar Baked Beans + 1 cup non-starchy vegetables   Juicy Chicken Breast 6 Ways with 2-3 servings carbohydrates (from Meal Planning Guide) + at least 1 cup non-starchy vegetables  30 Sheet Pan Dinners    Chicken Fajita Kabobs serve with brown rice, quinoa, or carb smart tortillas + additional non-starchy vegetables  Pasta Primavera add in additional protein such as shrimp, sliced steak, ground beef, or rotisserie chicken    [place]  Turkey Meatloaf + Broccoli serve with 1 whole baked potato and ¼ cup reduced fat cheese  Ranch Chicken Meal Prep  Calorie Swaps  Sweet Baby Rays BBQ sauce --> Sweet Baby Rays Zero Sugar BBQ Sauce  Greek Gods Honey Vanilla Greek Yogurt --> Oikos Triple Zero Vanilla Greek Yogurt  Ice cream [place] --> Skinny Cow No Sugar Added Ice Cream Bar  Butter --> [place] Butter with Light Butter  Rice --> 50/50 mixture of rice & cauliflower rice  Cream Cheese --> Whipped Cream Cheese  Sliced cheese --> ultra-thin sliced cheese  Wilson/Sour Cream --> Fage 0% Greek Yogurt  Syrup --> Lite version  Strawberry jam --> sugar free preserves  Tortillas --> corn tortillas or carb smart tortillas  Ranch --> [place] Ranch  Vinaigrettes --> Manny's Dressing  Coffee syrups --> Sugar free [id number]  Pasta --> Spaghetti Squash  Wilson --> avocado oil wilson  Avocado --> portion control smashed avocado cups  Restaurant Tips   Pick 1 out of the 4 Rule: Instead of eating bread/tortilla chips, an appetizer, alcoholic drink and dessert, choose just one to have with your entrée   Focus on lean proteins: Refer to lean meat/meat substitutes page in meal planning guidebook. Select items grilled, baked, broiled, braised, poached or roasted   For your heart health, avoid crispy, crunchy, breaded, paneed or stuffed items and items that are cream based, au gratin or buttered   Order sauces, dressings, and gravies on the side. This way you can add 1-2 tablespoons yourself. This helps with portion control    Request extra non-starchy vegetables instead of a starchy side dish. If the starchy side is something you love, consider splitting it with someone else at the table   Beverages: Order water with lemon, sparkling water, or unsweetened tea. Avoid sugary soft drinks, juices and mixers   Deep fried foods: Enjoy no more than 2x/month    Favorite Brands  Protein Bars/Shakes/Powders  Bars  RX Bars  Fit Crunch  THINK  Quest  One  Shakes  Core  Power Shakes (26g or 42g protein)  Fairlife Nutrition Plan (30g protein)   Ensure Max Protein (30g protein)  Premier Protein Shakes (30g protein)  Boost High Protein (20g protein)   Muscle Milk Genuine Protein Shakes (25g protein)   Quest Protein Shakes (30g protein)   Orgain Protein Shakes (20-26g protein)   Powders  Ghost  Optimum Nutrition   Muscle Milk  Garden of Life  Yang   Naked   Pasta/Rice  Banza Chickpea Pasta  Barilla Whole Wheat  Parish Rice  Success Boil in Bag Brown Rice  Bread  Dickson's Killer Bread thin sliced  Nature's Own 100% Whole Grain Sugar-Free  [place] Whole Wheat Small Slice  Blanca Simab Delightful White Whole Wheat  [place] Carb Balance Tortillas  Ole Extreme Wellness Tortillas  Flatout Wraps  [place]anderita Carb Counter Tortillas  Toufayan Keto Wraps  Dairy  Chobani less sugar  Dannon Light and Fit Yogurt  Fage 2% plain  Oikos Triple Zero  Fairlife 2% Milk  Ripple Milk, unsweetened(milk alternative)  Soy Milk, Vanilla Unsweetened  Good Culture Low-Fat Cottage Cheese  Dressings/Sauces  [place] Greek Yogurt Ranch   Sweet Baby Ray's BBQ Sauce - no added sugar  Any ketchup with no added/less added sugar  [place] or Artemio or Primal Kitchen  G Boss Sugar Free Sauces   Lockett's Own Dressings   Manny's Dressings   Cereal  Kashi GO!  Ratio  Shredded Wheat  Fiber One  Grape Nuts  Special K Protein  Snacks  Nature's Garden Trail Mix Snack Packs  Pop Chips (like healthier Lays)  Pop Corners (like healthier Doritos)  Skinny Pop Popcorn  Lesser Evil Popcorn  Beanitos Chips  Bahai Oats Rice Crisps   Late July Juan & Quinoa Tortilla Chips  Great Value Zero Sugar Beef Jerky (be mindful of sodium)  Chomps Meat Sticks        [facility] Eat Fit    Designed to take the guesswork out of dining out healthfully, [employer] Fit makes the healthy choice the easy choice   Visit [email or web address]    Download the [facility] Eat Fit [place] for free on your smartphone   Lists of all Eat Fit restaurant partners & dishes by  location   Nutrition facts included for all Eat Fit dishes   200+ Eat Fit approved recipes   Eat Fit shopping guide + community wellness resources   Eat Fit Cookbook   Craft: The Eat Fit Guide to Zero Proof Cocktails     K.I.S.S. (Keep It Simple Silly)  Pick 2-3 non-starchy vegetables  Rotate during the week having them for lunches and dinners.  Pick 1-2 different types of meats  Use different sauces   BBQ  [place]  Teriyaki  Pesto  Salsa  Seasonings  Slap Ya Mama  Lemon Pepper  Everything but the Drive Power's Chipotle Cherry  Pick 2 different starches to have during the week  For example:  Potatoes  sweet potatoes  Corn  butternut squash  Banza chickpea pasta  brown rice  Keep breakfast simple, have two options and stick with those two options the whole week  For example, making Overnight Oats with a serving of fruit for 3 breakfasts and eat 2 Healthy Egg Muffin Cups with a slice of whole grain toast and 1 serving fruit for the other 4 breakfasts    For lunch/dinner you could do something simple like the following: This would allow you to not waste a lot of ingredients but have a wide variety of flavors that you could mix match all week.  Chicken thighs 3 ways:   BBQ Sauce  Lemon Pepper Seasoning  Ranch Seasoning  Potatoes 2 ways:   cubed and roasted  mashed with low-fat milk, Greek yogurt (instead of butter or sour cream) and low-fat cheese  Brown rice - made in chicken stock, low sodium (gives it extra flavor)  Frozen Broccoli with ranch seasoning, microwaved  Frozen broccoli roasted, sprinkled with lemon juice and 1 tbsp parmesan  Green beans with sliced mushrooms  Green beans sauteed with garlic  15-Minute Meals  Two slices whole wheat bread + frozen turkey burger + microwave broccoli  Chickpea pasta + jar tomato sauce + chicken sausage + side salad kit  Microwave rice + rotisserie chicken + frozen veggie mix + teriyaki sauce + chopped peanuts  Matthias grilled chicken rotisserie or blackened grilled chicken  tenders + frozen garlic bread + baby carrots + hummus  Stir Meneses Chicken - make with Banza pasta + add in double vegetables   Shrimp + BBQ sauce + canned corn + canned beans + microwave vegetables  Can tuna/salmon/chicken + 2 tablespoons Greek yogurt/avocado oil jackson + 1 serving Nut Thins + apple + cheese stick       Calorie Tracking Tips  Tracking calories can be a great and helpful tool. Here are some tips to help you do it accurately:  Use a food scale  Measuring food with a scale provides the most accurate representation of portion sizes. This is particularly important for items like meats, grains, and other dense foods. It removes estimation errors.  Read nutrition labels  Familiarize yourself with nutrition labels on packaged foods. Pay attention to serving sizes and check the calories, macronutrients, and other relevant information. Be aware of hidden sources of calories such as added sugars or high-fat content.  Choose whole foods when possible  Whole, minimally processed foods are generally easier to track accurately than complex, multi-ingredient dishes. For instance, chicken breast is easier to track than a casserole.  Use a reliable tracking [place]  Cronometer, Lose It!, My Fitness Pal  Be precise with measurement  Be specific with measurements. Don't write down 1 slice of bread, but specify the type (whole wheat, white, etc.) and if possible, the weight in grams or ounces.  Account for cooking methods  Cooking can change the weight and nutritional content of food. For example, 4 ounces of raw chicken breast will weigh less after cooking due to moisture loss. Consider this when tracking.  Account for oils, not tracking how much oil we use in a pan can add extra calories quickly. Recommend using spray oil to minimize added calories to dishes.

## 2023-11-19 ENCOUNTER — E-VISIT (OUTPATIENT)
Dept: INTERNAL MEDICINE | Facility: CLINIC | Age: 58
End: 2023-11-19
Payer: COMMERCIAL

## 2023-11-19 DIAGNOSIS — K13.0 ANGULAR CHEILITIS: Primary | ICD-10-CM

## 2023-11-19 PROCEDURE — 99422 PR E&M, ONLINE DIGIT, EST, < 7 DAYS,  11-20 MINS: ICD-10-PCS | Mod: ,,, | Performed by: FAMILY MEDICINE

## 2023-11-19 PROCEDURE — 99422 OL DIG E/M SVC 11-20 MIN: CPT | Mod: ,,, | Performed by: FAMILY MEDICINE

## 2023-11-20 NOTE — PROGRESS NOTES
Patient ID: Linette Schmidt is a 58 y.o. female.    Chief Complaint: Rash (Entered automatically based on patient selection in Patient Portal.)    The patient initiated a request through [employer] on 11/19/2023 for evaluation and management with a chief complaint of Rash (Entered automatically based on patient selection in Patient Portal.)     I evaluated the questionnaire submission on 11/19/2023.    [facility] Peq Evisit Rash    11/19/2023  7:25 AM CST - Filed by Patient   Do you agree to participate in an E-Visit? Yes   If you have any of the following symptoms, please present to your local ER or call 911:  I acknowledge   What is the main issue that you would like for your doctor to address today? Very chap lips, specifically at the edges.  Been continually chap since September.   Getting worse, vaselline and chapstick dont help. Redness starting to show on skin around lips now too.  Mainly where top and bottom lip meet (very sore).   Are you able to take your vital signs? Yes   Systolic Blood Pressure: 117   Diastolic Blood Pressure: 72   Weight: 175   Height: 66   Pulse: 80   Temperature: 97.5   Respiration rate:    Pulse Oxygen: 98   How would you describe your skin problem? Rash   When did your symptoms first appear? 9/13/2023   Where is it located?  Other   Does it itch? No   Does it hurt? Yes   Where is the pain located? Where the skin change is noted   The pain came on: Gradually   The pain has the character of: Burning   Frequency of the pain (How often does it appear)? This is the first time I have had this rash   Please select the face that most closely captures your pain level: 4   Is there discharge or drainage? No   Is there bleeding? No   Describe the character Scaly   Describe the color Pink   Has it changed over time? Spread to other locations   Frequency of skin problem Always there   Duration of the skin problem (how long does it stay when it is present) Months   I have had a new exposure to No new  exposures   I have had a new exposure to No new exposures   What have you used to treat the skin problem? Chap stick and vaseline   If you have used anything for treatment, has it helped the symptoms? No   Other generalized symptoms that you associate with the rash No other symptoms   Provide any information you feel is important to your history not asked above First time that chapped lips dont get bettervwith chapstick, or that it ladted this long   At least one photo is required for treatment to be provided. You can upload a maximum of three photos of the affected area.           Recent Labs Obtained:  No visits with results within 7 Day(s) from this visit.   Latest known visit with results is:   Clinical Support on 07/06/2023   Component Date Value Ref Range Status    Sodium 07/06/2023 145  136 - 145 mmol/L Final    Potassium 07/06/2023 4.2  3.5 - 5.1 mmol/L Final    Chloride 07/06/2023 106  95 - 110 mmol/L Final    CO2 07/06/2023 29  23 - 29 mmol/L Final    Glucose 07/06/2023 92  70 - 110 mg/dL Final    BUN 07/06/2023 19  6 - 20 mg/dL Final    Creatinine 07/06/2023 0.8  0.5 - 1.4 mg/dL Final    Calcium 07/06/2023 9.7  8.7 - 10.5 mg/dL Final    Total Protein 07/06/2023 6.8  6.0 - 8.4 g/dL Final    Albumin 07/06/2023 4.2  3.5 - 5.2 g/dL Final    Total Bilirubin 07/06/2023 0.9  0.1 - 1.0 mg/dL Final    Comment: For infants and newborns, interpretation of results should be based  on gestational age, weight and in agreement with clinical  observations.    Premature Infant recommended reference ranges:  Up to 24 hours.............<8.0 mg/dL  Up to 48 hours............<12.0 mg/dL  3-5 days..................<15.0 mg/dL  6-29 days.................<15.0 mg/dL      Alkaline Phosphatase 07/06/2023 50 (L)  55 - 135 U/L Final    AST 07/06/2023 21  10 - 40 U/L Final    ALT 07/06/2023 15  10 - 44 U/L Final    eGFR 07/06/2023 >60  >60 mL/min/1.73 m^2 Final    Anion Gap 07/06/2023 10  8 - 16 mmol/L Final    WBC 07/06/2023 4.25   3.90 - 12.70 K/uL Final    RBC 07/06/2023 4.40  4.00 - 5.40 M/uL Final    Hemoglobin 07/06/2023 13.1  12.0 - 16.0 g/dL Final    Hematocrit 07/06/2023 37.6  37.0 - 48.5 % Final    MCV 07/06/2023 86  82 - 98 fL Final    MCH 07/06/2023 29.8  27.0 - 31.0 pg Final    MCHC 07/06/2023 34.8  32.0 - 36.0 g/dL Final    RDW 07/06/2023 12.4  11.5 - 14.5 % Final    Platelets 07/06/2023 205  150 - 450 K/uL Final    MPV 07/06/2023 12.3  9.2 - 12.9 fL Final    Cholesterol 07/06/2023 139  120 - 199 mg/dL Final    Comment: The National Cholesterol Education Program (NCEP) has set the  following guidelines (reference ranges) for Cholesterol:  Optimal.....................<200 mg/dL  Borderline High.............200-239 mg/dL  High........................> or = 240 mg/dL      Triglycerides 07/06/2023 79  30 - 150 mg/dL Final    Comment: The National Cholesterol Education Program (NCEP) has set the  following guidelines (reference values) for triglycerides:  Normal......................<150 mg/dL  Borderline High.............150-199 mg/dL  High........................200-499 mg/dL      HDL 07/06/2023 56  40 - 75 mg/dL Final    Comment: The National Cholesterol Education Program (NCEP) has set the  following guidelines (reference values) for HDL Cholesterol:  Low...............<40 mg/dL  Optimal...........>60 mg/dL      LDL Cholesterol 07/06/2023 67.2  63.0 - 159.0 mg/dL Final    Comment: The National Cholesterol Education Program (NCEP) has set the  following guidelines (reference values) for LDL Cholesterol:  Optimal.......................<130 mg/dL  Borderline High...............130-159 mg/dL  High..........................160-189 mg/dL  Very High.....................>190 mg/dL      HDL/Cholesterol Ratio 07/06/2023 40.3  20.0 - 50.0 % Final    Total Cholesterol/HDL Ratio 07/06/2023 2.5  2.0 - 5.0 Final    Non-HDL Cholesterol 07/06/2023 83  mg/dL Final    Comment: Risk category and Non-HDL cholesterol goals:  Coronary heart disease  (CHD)or equivalent (10-year risk of CHD >20%):  Non-HDL cholesterol goal     <130 mg/dL  Two or more CHD risk factors and 10-year risk of CHD <= 20%:  Non-HDL cholesterol goal     <160 mg/dL  0 to 1 CHD risk factor:  Non-HDL cholesterol goal     <190 mg/dL      TSH 2023 1.317  0.400 - 4.000 uIU/mL Final    Hemoglobin A1C 2023 4.9  4.0 - 5.6 % Final    Comment: ADA Screening Guidelines:  5.7-6.4%  Consistent with prediabetes  >or=6.5%  Consistent with diabetes    High levels of fetal hemoglobin interfere with the HbA1C  assay. Heterozygous hemoglobin variants (HbS, HgC, etc)do  not significantly interfere with this assay.   However, presence of multiple variants may affect accuracy.      Estimated Avg Glucose 2023 94  68 - 131 mg/dL Final       Encounter Diagnosis   Name Primary?    Angular cheilitis Yes        No orders of the defined types were placed in this encounter.           No follow-ups on file.      E-Visit Time Tracking:    Day 1 Time (in minutes): 15     Total Time (in minutes): 15        Cumulative time codin to 20 minutes (CPT 83044)       Dear Linette,    Thank you for providing such detailed information about your skin condition. Based on your description of the rash, the gradual onset of pain with a burning character, and the fact that it hasn't improved with your usual chapstick and Vaseline, it sounds like you may be experiencing a condition called Angular Cheilitis.    Angular Cheilitis is a common skin problem that can cause cracked lips that can be painful, especially at the corners of your mouth. I understand how persistent and bothersome this must be for you, especially since it's the first time your chapped lips have not responded to your usual treatments.    To help with your symptoms, I recommend trying over-the-counter (OTC) miconazole cream applied twice daily for 3 weeks. Additionally, using OTC zinc oxide cream at bedtime can provide a protective barrier and  promote healing. These treatments are aimed at addressing the condition and should help alleviate the discomfort you've been feeling.    I hope this helps.    Wishing you relief and healing,    Dr. JHAVERI

## 2023-11-28 ENCOUNTER — OFFICE VISIT (OUTPATIENT)
Dept: INTERNAL MEDICINE | Facility: CLINIC | Age: 58
End: 2023-11-28
Payer: COMMERCIAL

## 2023-11-28 VITALS
RESPIRATION RATE: 18 BRPM | OXYGEN SATURATION: 99 % | WEIGHT: 180.31 LBS | HEART RATE: 91 BPM | SYSTOLIC BLOOD PRESSURE: 110 MMHG | BODY MASS INDEX: 29.12 KG/M2 | TEMPERATURE: 98 F | DIASTOLIC BLOOD PRESSURE: 74 MMHG

## 2023-11-28 DIAGNOSIS — E78.2 MIXED HYPERLIPIDEMIA: Chronic | ICD-10-CM

## 2023-11-28 DIAGNOSIS — I10 ESSENTIAL HYPERTENSION: Chronic | ICD-10-CM

## 2023-11-28 DIAGNOSIS — K13.0 ANGULAR CHEILITIS: ICD-10-CM

## 2023-11-28 DIAGNOSIS — L71.0 PERIORAL DERMATITIS: Primary | ICD-10-CM

## 2023-11-28 DIAGNOSIS — Z12.83 SKIN CANCER SCREENING: ICD-10-CM

## 2023-11-28 PROCEDURE — 99999 PR PBB SHADOW E&M-EST. PATIENT-LVL V: CPT | Mod: PBBFAC,,, | Performed by: FAMILY MEDICINE

## 2023-11-28 PROCEDURE — 99999 PR PBB SHADOW E&M-EST. PATIENT-LVL V: ICD-10-PCS | Mod: PBBFAC,,, | Performed by: FAMILY MEDICINE

## 2023-11-28 PROCEDURE — 99214 OFFICE O/P EST MOD 30 MIN: CPT | Mod: S$GLB,,, | Performed by: FAMILY MEDICINE

## 2023-11-28 PROCEDURE — 99214 PR OFFICE/OUTPT VISIT, EST, LEVL IV, 30-39 MIN: ICD-10-PCS | Mod: S$GLB,,, | Performed by: FAMILY MEDICINE

## 2023-11-28 RX ORDER — PIMECROLIMUS 10 MG/G
CREAM TOPICAL 2 TIMES DAILY
Qty: 30 G | Refills: 0 | Status: SHIPPED | OUTPATIENT
Start: 2023-11-28 | End: 2023-12-28

## 2023-11-28 RX ORDER — METRONIDAZOLE 7.5 MG/G
CREAM TOPICAL DAILY
Qty: 45 G | Refills: 1 | Status: SHIPPED | OUTPATIENT
Start: 2023-11-28 | End: 2024-01-23

## 2023-11-28 NOTE — ASSESSMENT & PLAN NOTE
Lab Results   Component Value Date    CHOL 139 07/06/2023    CHOL 176 09/29/2022    TRIG 79 07/06/2023    TRIG 70 09/29/2022    HDL 56 07/06/2023    HDL 72 09/29/2022    LDLCALC 67.2 07/06/2023    LDLCALC 90.0 09/29/2022    NONHDLCHOL 83 07/06/2023    NONHDLCHOL 104 09/29/2022    AST 21 07/06/2023    ALT 15 07/06/2023     The 10-year ASCVD risk score (Ginna ARGUELLES, et al., 2019) is: 1.5%    Values used to calculate the score:      Age: 58 years      Sex: Female      Is Non- : No      Diabetic: No      Tobacco smoker: No      Systolic Blood Pressure: 110 mmHg      Is BP treated: Yes      HDL Cholesterol: 56 mg/dL      Total Cholesterol: 139 mg/dL

## 2023-11-28 NOTE — Clinical Note
Hi, Shoney.  Would you please schedule her for [facility] with me in July?  Thank you for your help caring for our patients!  -CACHORRO

## 2023-11-28 NOTE — PROGRESS NOTES
OFFICE VISIT 11/28/23  2:40 PM CST    CHIEF COMPLAINT: Lip Issues    The angular cheilitis reported during a recent e-visit is improved. However, she has developed worsening perioral dermatitis as shown in photos. She cannot identify an allergen or irritant. She cannot identify any food associations. She has stopped using cosmetics, and this made no difference. We discussed the differential diagnosis. It was agreed to begin a trial of therapy with prescriptions ordered. She says she wants a referral to dermatology, regardless, because she sees a dermatologist every year for annual skin cancer screening, and she does not like her present dermatologist. Other chronic conditions listed appear compensated/controlled and stable.  1. Perioral dermatitis  -     pimecrolimus (ELIDEL) 1 % cream; Apply topically 2 (two) times daily.  Dispense: 30 g; Refill: 0  -     metronidazole 0.75% (METROCREAM) 0.75 % Crea; Apply topically once daily.  Dispense: 45 g; Refill: 1  -     Ambulatory referral/consult to Dermatology; Future; Expected date: 12/05/2023    2. Angular cheilitis  -     metronidazole 0.75% (METROCREAM) 0.75 % Crea; Apply topically once daily.  Dispense: 45 g; Refill: 1  -     Ambulatory referral/consult to Dermatology; Future; Expected date: 12/05/2023    3. Skin cancer screening  -     Ambulatory referral/consult to Dermatology; Future; Expected date: 12/05/2023    4. Mixed hyperlipidemia  Overview:  CARDIOLOGIST: Dr. Marky Armendariz    Assessment & Plan:  Lab Results   Component Value Date    CHOL 139 07/06/2023    CHOL 176 09/29/2022    TRIG 79 07/06/2023    TRIG 70 09/29/2022    HDL 56 07/06/2023    HDL 72 09/29/2022    LDLCALC 67.2 07/06/2023    LDLCALC 90.0 09/29/2022    NONHDLCHOL 83 07/06/2023    NONHDLCHOL 104 09/29/2022    AST 21 07/06/2023    ALT 15 07/06/2023     The 10-year ASCVD risk score (Ginna ARGUELLES, et al., 2019) is: 1.5%    Values used to calculate the score:      Age: 58 years      Sex: Female      Is  Non- : No      Diabetic: No      Tobacco smoker: No      Systolic Blood Pressure: 110 mmHg      Is BP treated: Yes      HDL Cholesterol: 56 mg/dL      Total Cholesterol: 139 mg/dL       5. Essential hypertension  Overview:  Cardiologist: Dr. Marky Armendariz    Assessment & Plan:  BP Readings from Last 6 Encounters:   11/28/23 110/74   10/16/23 118/60   10/12/23 114/80   08/09/23 114/66   07/06/23 108/71   07/03/23 116/77     Lab Results   Component Value Date    EGFRNORACEVR >60 07/06/2023    CREATININE 0.8 07/06/2023    BUN 19 07/06/2023    K 4.2 07/06/2023     07/06/2023     07/06/2023     Results for orders placed or performed during the hospital encounter of 09/29/22   EKG 12-lead    Collection Time: 09/29/22 10:16 AM    Narrative    Test Reason : Z00.00,    Vent. Rate : 072 BPM     Atrial Rate : 072 BPM     P-R Int : 114 ms          QRS Dur : 084 ms      QT Int : 414 ms       P-R-T Axes : 070 072 060 degrees     QTc Int : 453 ms    Normal sinus rhythm  Normal ECG  When compared with ECG of 22-JUN-2020 07:52,  No significant change was found  Confirmed by TRACY GOEL MD ([id number]) on 9/30/2022 7:27:07 AM    Referred By: SEFERINO HAMMONDS           Confirmed By:TRACY GOEL MD          Unless noted herein, any chronic conditions are represented as and appear stable, and no other significant complaints or concerns were reported.    Follow up in about 7 months (around 7/12/2024) for annual Executive Health exam.    Review of Systems   Constitutional:  Negative for fatigue and fever.   HENT:  Negative for nasal congestion, rhinorrhea and sore throat.    Eyes:  Negative for pain.   Respiratory:  Negative for cough and shortness of breath.    Gastrointestinal:  Negative for diarrhea and vomiting.   Integumentary:  Positive for rash.       Vitals:    11/28/23 1425   BP: 110/74   Pulse: 91   Resp: 18   Temp: 97.7 °F (36.5 °C)   SpO2: 99%   Weight: 81.8 kg (180 lb 5.4 oz)   Physical  "Exam  Vitals reviewed.   Constitutional:       General: She is not in acute distress.     Appearance: Normal appearance. She is not ill-appearing, toxic-appearing or diaphoretic.   Cardiovascular:      Rate and Rhythm: Normal rate.   Pulmonary:      Effort: Pulmonary effort is normal.   Skin:     Findings: Rash present.   Neurological:      Mental Status: She is alert and oriented to person, place, and time. Mental status is at baseline.   Psychiatric:         Mood and Affect: Mood normal.         Behavior: Behavior normal.         Judgment: Judgment normal.          Documentation entered by me for this encounter may have been done in part using speech-recognition technology. Although I have made an effort to ensure accuracy, "sound like" errors may exist and should be interpreted in context.  "

## 2023-11-28 NOTE — ASSESSMENT & PLAN NOTE
BP Readings from Last 6 Encounters:   11/28/23 110/74   10/16/23 118/60   10/12/23 114/80   08/09/23 114/66   07/06/23 108/71   07/03/23 116/77     Lab Results   Component Value Date    EGFRNORACEVR >60 07/06/2023    CREATININE 0.8 07/06/2023    BUN 19 07/06/2023    K 4.2 07/06/2023     07/06/2023     07/06/2023     Results for orders placed or performed during the hospital encounter of 09/29/22   EKG 12-lead    Collection Time: 09/29/22 10:16 AM    Narrative    Test Reason : Z00.00,    Vent. Rate : 072 BPM     Atrial Rate : 072 BPM     P-R Int : 114 ms          QRS Dur : 084 ms      QT Int : 414 ms       P-R-T Axes : 070 072 060 degrees     QTc Int : 453 ms    Normal sinus rhythm  Normal ECG  When compared with ECG of 22-JUN-2020 07:52,  No significant change was found  Confirmed by TRAYC GOEL MD ([id number]) on 9/30/2022 7:27:07 AM    Referred By: SEFERINO HAMMONDS           Confirmed By:TRACY GOEL MD

## 2023-12-12 ENCOUNTER — TELEPHONE (OUTPATIENT)
Dept: PRIMARY CARE CLINIC | Facility: CLINIC | Age: 58
End: 2023-12-12
Payer: COMMERCIAL

## 2023-12-19 ENCOUNTER — PATIENT MESSAGE (OUTPATIENT)
Dept: PRIMARY CARE CLINIC | Facility: CLINIC | Age: 58
End: 2023-12-19
Payer: COMMERCIAL

## 2023-12-26 ENCOUNTER — PATIENT MESSAGE (OUTPATIENT)
Dept: PRIMARY CARE CLINIC | Facility: CLINIC | Age: 58
End: 2023-12-26
Payer: COMMERCIAL

## 2024-01-03 DIAGNOSIS — Z51.81 MEDICATION MONITORING ENCOUNTER: Primary | ICD-10-CM

## 2024-01-10 ENCOUNTER — OFFICE VISIT (OUTPATIENT)
Dept: PRIMARY CARE CLINIC | Facility: CLINIC | Age: 59
End: 2024-01-10
Payer: COMMERCIAL

## 2024-01-10 VITALS
HEIGHT: 66 IN | SYSTOLIC BLOOD PRESSURE: 112 MMHG | WEIGHT: 182.75 LBS | DIASTOLIC BLOOD PRESSURE: 76 MMHG | TEMPERATURE: 97 F | BODY MASS INDEX: 29.37 KG/M2 | OXYGEN SATURATION: 98 % | HEART RATE: 77 BPM | RESPIRATION RATE: 18 BRPM

## 2024-01-10 DIAGNOSIS — Z86.39 HISTORY OF OBESITY: ICD-10-CM

## 2024-01-10 DIAGNOSIS — E78.2 MIXED HYPERLIPIDEMIA: Chronic | ICD-10-CM

## 2024-01-10 DIAGNOSIS — I10 ESSENTIAL HYPERTENSION: Primary | Chronic | ICD-10-CM

## 2024-01-10 PROCEDURE — 99999 PR PBB SHADOW E&M-EST. PATIENT-LVL IV: CPT | Mod: PBBFAC,,, | Performed by: FAMILY MEDICINE

## 2024-01-10 PROCEDURE — 99214 OFFICE O/P EST MOD 30 MIN: CPT | Mod: S$GLB,,, | Performed by: FAMILY MEDICINE

## 2024-01-10 RX ORDER — METFORMIN HYDROCHLORIDE 500 MG/1
TABLET, EXTENDED RELEASE ORAL
Qty: 60 TABLET | Refills: 2 | Status: SHIPPED | OUTPATIENT
Start: 2024-01-10 | End: 2024-02-06

## 2024-01-10 NOTE — PATIENT INSTRUCTIONS
Try the metformin first    [email or web address]  Please print coupon    Please let me know you are doing.

## 2024-01-10 NOTE — PROGRESS NOTES
Subjective     Patient ID: Linette Schmidt is a 58 y.o. female.    Chief Complaint: follow up    Pt saw nutrition.  Hx of obesity; bmi now 29 (last ov).     Htn, hyperlipidemia, asthma controlled. She has some weight fluctuations. Weight is up from last visit.     Pt has not had success with glp coverage. Cost prohibitive.   Harder to stay on track when visits inlaws  (fried fish, etc, limited vegetables).   Modest help with lomaira. Previous weight loss with optavia.     Started working out.     HPI       Objective     PAST MEDICAL HISTORY:  Past Medical History:   Diagnosis Date    Asthma     Osteoporosis          PAST SURGICAL HISTORY:  Past Surgical History:   Procedure Laterality Date    FINGER FRACTURE SURGERY Left     5th finger    HYSTERECTOMY      PARTIAL HYSTERECTOMY         FAMILY HISTORY:  Family History   Problem Relation Age of Onset    Hypertension Mother     Hypertension Father     Hypertension Sister     Breast cancer Neg Hx     Colon cancer Neg Hx     Ovarian cancer Neg Hx           SOCIAL HISTORY:  Social History     Social History Narrative    Not on file       MEDICATIONS:  Medications have been reviewed.    ALLERGIES:  Allergies have been reviewed.    Vitals:    01/10/24 1312   BP: 112/76   Pulse: 77   Resp: 18   Temp: 97.1 °F (36.2 °C)     Wt Readings from Last 10 Encounters:   01/10/24 82.9 kg (182 lb 12.2 oz)   11/28/23 81.8 kg (180 lb 5.4 oz)   11/07/23 80.5 kg (177 lb 7.5 oz)   10/16/23 81.7 kg (180 lb 1.9 oz)   10/12/23 82.3 kg (181 lb 7 oz)   08/09/23 80 kg (176 lb 5.9 oz)   07/06/23 74.8 kg (165 lb)   07/03/23 76.1 kg (167 lb 12.3 oz)   05/29/23 78.2 kg (172 lb 6.4 oz)   03/21/23 74.8 kg (165 lb)       Lab Results   Component Value Date    WBC 4.25 07/06/2023    HGB 13.1 07/06/2023    HCT 37.6 07/06/2023     07/06/2023    CHOL 139 07/06/2023    TRIG 79 07/06/2023    HDL 56 07/06/2023    ALT 15 07/06/2023    AST 21 07/06/2023     07/06/2023    K 4.2 07/06/2023      07/06/2023    CREATININE 0.8 07/06/2023    BUN 19 07/06/2023    CO2 29 07/06/2023    TSH 1.317 07/06/2023    HGBA1C 4.9 07/06/2023       Review of Systems   Constitutional:  Negative for activity change, appetite change, fatigue and fever.   HENT:  Negative for mouth dryness and goiter.    Eyes:  Negative for visual disturbance.   Respiratory:  Negative for apnea, cough, chest tightness and shortness of breath.    Cardiovascular:  Negative for chest pain, palpitations and leg swelling.   Gastrointestinal:  Negative for abdominal pain, constipation, diarrhea, nausea, vomiting and reflux.   Endocrine: Negative for cold intolerance, heat intolerance, polydipsia, polyphagia and polyuria.   Genitourinary:  Negative for frequency and menstrual problem.   Musculoskeletal:  Negative for arthralgias and myalgias.   Integumentary:  Negative for color change and rash.   Psychiatric/Behavioral:  Negative for self-injury and sleep disturbance. The patient is not nervous/anxious.        Physical Exam  Vitals and nursing note reviewed.   Constitutional:       General: She is not in acute distress.  HENT:      Head: Normocephalic and atraumatic.      Mouth/Throat:      Pharynx: Oropharynx is clear.   Eyes:      General: No scleral icterus.     Pupils: Pupils are equal, round, and reactive to light.   Neck:      Comments: No TM  Cardiovascular:      Rate and Rhythm: Normal rate and regular rhythm.      Pulses: Normal pulses.      Heart sounds: Normal heart sounds. No murmur heard.     No friction rub. No gallop.   Pulmonary:      Effort: Pulmonary effort is normal. No respiratory distress.      Breath sounds: Normal breath sounds. No wheezing, rhonchi or rales.   Abdominal:      General: Bowel sounds are normal. There is no distension.      Palpations: Abdomen is soft.      Tenderness: There is no abdominal tenderness.   Musculoskeletal:         General: No swelling.      Cervical back: Normal range of motion and neck supple. No  tenderness.   Lymphadenopathy:      Cervical: No cervical adenopathy.   Skin:     General: Skin is warm.      Findings: No erythema or rash.   Neurological:      Mental Status: She is alert and oriented to person, place, and time.   Psychiatric:         Mood and Affect: Mood normal.         Behavior: Behavior normal.              Assessment and Plan     1. Essential hypertension    2. Mixed hyperlipidemia    3. BMI 29.0-29.9,adult    4. History of obesity      Essential hypertension  Comments:  chronic/stable  Orders:  -     metFORMIN (GLUCOPHAGE-XR) 500 MG ER 24hr tablet; Take 1 tablet (500 mg total) by mouth once daily for 14 days, THEN 1 tablet (500 mg total) 2 (two) times daily with meals for 14 days.  Dispense: 60 tablet; Refill: 2    Mixed hyperlipidemia  Comments:  chronic/stable    BMI 29.0-29.9,adult  Comments:  pt declines insulin level  reviewed metformin and mounjaro  Orders:  -     metFORMIN (GLUCOPHAGE-XR) 500 MG ER 24hr tablet; Take 1 tablet (500 mg total) by mouth once daily for 14 days, THEN 1 tablet (500 mg total) 2 (two) times daily with meals for 14 days.  Dispense: 60 tablet; Refill: 2    History of obesity  -     metFORMIN (GLUCOPHAGE-XR) 500 MG ER 24hr tablet; Take 1 tablet (500 mg total) by mouth once daily for 14 days, THEN 1 tablet (500 mg total) 2 (two) times daily with meals for 14 days.  Dispense: 60 tablet; Refill: 2    Again reviewed AOMS  Will try metformin first, if intolerable then wiill send in zepbound           Follow up in about 8 weeks (around 3/6/2024), or if symptoms worsen or fail to improve.  Risks/benefits/common side effects of medication discussed with patient at length. UTD patient handout given. (mychart msg)  D/W pt at length potential pharmacologic options; she desires consideration of zepbound. Risks/benefits/common side effects of zepbound d/w pt including nausea and pain at injection site; she is aware should not get pregnant while taking and not approved while  breastfeeding. NO personal/fam hx of MEN or medullary thyroid cancer. No hx of pancreatitis. Instructed pt how to use with demo pen; pt practiced in office. Pt advised if approved will get pt handout from pharmacy. Pt has no hx of thyroid nodules or biliary disease/gallstones. DW pt with substantial or rapid weight loss gallstones could develop. Also dw pt glp-1 MOA and common side effects.   Also reviewed with pt any mh changes, gastroparesis

## 2024-01-12 ENCOUNTER — OFFICE VISIT (OUTPATIENT)
Dept: DERMATOLOGY | Facility: CLINIC | Age: 59
End: 2024-01-12
Payer: COMMERCIAL

## 2024-01-12 ENCOUNTER — TELEPHONE (OUTPATIENT)
Dept: PRIMARY CARE CLINIC | Facility: CLINIC | Age: 59
End: 2024-01-12
Payer: COMMERCIAL

## 2024-01-12 DIAGNOSIS — Z86.39 HISTORY OF OBESITY: ICD-10-CM

## 2024-01-12 DIAGNOSIS — L57.0 ACTINIC KERATOSES: ICD-10-CM

## 2024-01-12 DIAGNOSIS — L98.8 RHYTIDES: Primary | ICD-10-CM

## 2024-01-12 DIAGNOSIS — I10 ESSENTIAL HYPERTENSION: Primary | ICD-10-CM

## 2024-01-12 DIAGNOSIS — L71.0 PERIORAL DERMATITIS: ICD-10-CM

## 2024-01-12 DIAGNOSIS — E78.2 MIXED HYPERLIPIDEMIA: ICD-10-CM

## 2024-01-12 PROCEDURE — 17000 DESTRUCT PREMALG LESION: CPT | Mod: S$GLB,,, | Performed by: STUDENT IN AN ORGANIZED HEALTH CARE EDUCATION/TRAINING PROGRAM

## 2024-01-12 PROCEDURE — 99204 OFFICE O/P NEW MOD 45 MIN: CPT | Mod: 25,S$GLB,, | Performed by: STUDENT IN AN ORGANIZED HEALTH CARE EDUCATION/TRAINING PROGRAM

## 2024-01-12 PROCEDURE — 99999 PR PBB SHADOW E&M-EST. PATIENT-LVL III: CPT | Mod: PBBFAC,,, | Performed by: STUDENT IN AN ORGANIZED HEALTH CARE EDUCATION/TRAINING PROGRAM

## 2024-01-12 PROCEDURE — 17003 DESTRUCT PREMALG LES 2-14: CPT | Mod: S$GLB,,, | Performed by: STUDENT IN AN ORGANIZED HEALTH CARE EDUCATION/TRAINING PROGRAM

## 2024-01-12 RX ORDER — HYDROCORTISONE 25 MG/G
OINTMENT TOPICAL 2 TIMES DAILY
Qty: 80 G | Refills: 2 | Status: SHIPPED | OUTPATIENT
Start: 2024-01-12

## 2024-01-12 RX ORDER — TRETINOIN 0.6 MG/G
1 GEL TOPICAL NIGHTLY
Qty: 50 G | Refills: 5 | Status: SHIPPED | OUTPATIENT
Start: 2024-01-12

## 2024-01-12 NOTE — TELEPHONE ENCOUNTER
----- Message from Sabiha Cardoso RD sent at 1/12/2024  7:54 AM CST -----  Regarding: Nutrition Referral  Good Morning    Can you please enter a Nutrition Consult referral ([id number]) for Linette Schmidt for Nutritional Counseling.  Thank you!    Sabiha Cardoso RD

## 2024-01-12 NOTE — PATIENT INSTRUCTIONS

## 2024-01-12 NOTE — ADDENDUM NOTE
Addended by: RADU ESPINOZA on: 1/12/2024 01:57 PM     Modules accepted: Orders, Level of Service

## 2024-01-12 NOTE — PROGRESS NOTES
Patient Information  Name: Linette Schmidt  : 1965  MRN: [id number]     Referring Physician:  Dr. Epps   Primary Care Physician:  COY Schulte MD   Date of Visit: 2024      Subjective:       Linette Schmidt is a 58 y.o. female who presents for   Chief Complaint   Patient presents with    Allergic Reaction     C/o allergic reaction around lips, pt is in a flu vaccine study, no hx of skin cancer       Patient with new complaint of lesion(s)  Location: upper lip  Duration: 5-6 months  Symptoms: dry lips  Relieving factors/Previous treatments: pimecrolimus, metronidazole with improvement    Patient with new complaint of lesion(s)  Location: nose  Duration: months  Symptoms: scaly  Relieving factors/Previous treatments: none    This occurred after she received vaccine in research study.    Patient was last seen:Visit date not found     Prior notes by myself reviewed.   Clinical documentation obtained by nursing staff reviewed.    Review of Systems   Skin:  Positive for rash. Negative for itching.        Objective:    Physical Exam   Constitutional: She appears well-developed and well-nourished. No distress.   Neurological: She is alert and oriented to person, place, and time. She is not disoriented.   Psychiatric: She has a normal mood and affect.   Skin:   Areas Examined (abnormalities noted in diagram):   Head / Face Inspection Performed              Diagram Legend     Erythematous scaling macule/papule c/w actinic keratosis       Vascular papule c/w angioma      Pigmented verrucoid papule/plaque c/w seborrheic keratosis      Yellow umbilicated papule c/w sebaceous hyperplasia      Irregularly shaped tan macule c/w lentigo     1-2 mm smooth white papules consistent with Milia      Movable subcutaneous cyst with punctum c/w epidermal inclusion cyst      Subcutaneous movable cyst c/w pilar cyst      Firm pink to brown papule c/w dermatofibroma      Pedunculated fleshy papule(s) c/w  skin tag(s)      Evenly pigmented macule c/w junctional nevus     Mildly variegated pigmented, slightly irregular-bordered macule c/w mildly atypical nevus      Flesh colored to evenly pigmented papule c/w intradermal nevus       Pink pearly papule/plaque c/w basal cell carcinoma      Erythematous hyperkeratotic cursted plaque c/w SCC      Surgical scar with no sign of skin cancer recurrence      Open and closed comedones      Inflammatory papules and pustules      Verrucoid papule consistent consistent with wart     Erythematous eczematous patches and plaques     Dystrophic onycholytic nail with subungual debris c/w onychomycosis     Umbilicated papule    Erythematous-base heme-crusted tan verrucoid plaque consistent with inflamed seborrheic keratosis     Erythematous Silvery Scaling Plaque c/w Psoriasis     See annotation      No images are attached to the encounter or orders placed in the encounter.    [] Data reviewed  [] Independent review of test  [] Management discussed with another provider    Assessment / Plan:        Perioral dermatitis  -     ?Reaction from flu vaccine  -     hydrocortisone 2.5 % ointment; Apply topically 2 (two) times daily.  Dispense: 80 g; Refill: 2    Counseling on topical steroids:  Patient counseled that the prolonged use of topical steroids can result in the increased appearance superficial blood vessels (telangiectasias) lightening (hypopigmentation), and   thinning of the skin ( atrophy).  Patient understands to avoid using high potency steroids in skin folds, the groin or the face.  The patient verbalized understanding of proper use and possible adverse effects of topical steroids.  All patient's questions and concerns were addressed.    Rhytides  -     tretinoin microspheres (RETIN-A MICRO PUMP) 0.06 % GlwP; Apply 1 application  topically every evening.  Dispense: 50 g; Refill: 5    Actinic keratoses  Cryosurgery Procedure Note    Verbal consent from the patient is obtained  including, but not limited to, risk of hypopigmentation/hyperpigmentation, scar, recurrence of lesion. The patient is aware of the precancerous quality and need for treatment of these lesions. Liquid nitrogen cryosurgery is applied to the 3 actinic keratoses, as detailed in the physical exam, to produce a freeze injury. The patient is aware that blisters may form and is instructed on wound care with gentle cleansing and use of vaseline ointment to keep moist until healed. The patient is supplied a handout on cryosurgery and is instructed to call if lesions do not completely resolve.               LOS NUMBER AND COMPLEXITY OF PROBLEMS    COMPLEXITY OF DATA RISK TOTAL TIME (m)   65483  16747 [] 1 self-limited or minor problem [x] Minimal to none [] No treatment recommended or patient to monitor 15-29  10-19   49817  94453 Low  [] 2 or > self limited or minor problems  [] 1 stable chronic illness  [] 1 acute, uncomplicated illness or injury Limited (2)  [] Prior external notes from each unique source  [] Review result of each unique test  [] Order each unique test []  Low  OTC medications, minor skin biopsy 30-44 20-29   77090  43834 Moderate  [x]  1 or > chronic illness with progression, exacerbation or SE of treatment  []  2 or more stable chronic illnesses  []  1 acute illness with systemic symptoms  []  1 acute complicated injury  []  1 undiagnosed new problem with uncertain prognosis Moderate (1/3 below)  []  3 or more data items        *Now includes assessment requiring independent historian  []  Independent interpretation of a test  []  Discuss management/test with another provider Moderate  [x]  Prescription drug mgmt  []  Minor surgery with risk discussed  []  Mgmt limited by social determinates 45-59  30-39   82015  50963 High  []  1 or more chronic illness with severe exacerbation, progression or SE of treatment  []  1 acute or chronic illness/injury that poses a threat to life or bodily function Extensive  (2/3 below)  []  3 or more data items        *Now includes assessment requiring independent historian.  []  Independent interpretation of a test  []  Discuss management/test with another provider High  []  Major surgery with risk discussed  []  Drug therapy requiring intensive monitoring for toxicity  []  Hospitalization  []  Decision for DNR 60-74  40-54      No follow-ups on file.    Gardenia Betancourt MD, FAAD  [facility]

## 2024-01-23 ENCOUNTER — NUTRITION (OUTPATIENT)
Dept: PRIMARY CARE CLINIC | Facility: CLINIC | Age: 59
End: 2024-01-23
Payer: COMMERCIAL

## 2024-01-23 DIAGNOSIS — E78.2 MIXED HYPERLIPIDEMIA: ICD-10-CM

## 2024-01-23 DIAGNOSIS — Z86.39 HISTORY OF OBESITY: ICD-10-CM

## 2024-01-23 DIAGNOSIS — I10 ESSENTIAL HYPERTENSION: ICD-10-CM

## 2024-01-23 PROCEDURE — 97803 MED NUTRITION INDIV SUBSEQ: CPT | Mod: S$GLB,,, | Performed by: DIETITIAN, REGISTERED

## 2024-01-23 PROCEDURE — 99999 PR PBB SHADOW E&M-EST. PATIENT-LVL I: CPT | Mod: PBBFAC,,, | Performed by: DIETITIAN, REGISTERED

## 2024-01-23 NOTE — PROGRESS NOTES
"Nutrition Assessment    Visit Type: insurance follow up  Session Time:  1 Hours  Reason for MNT visit: Pt in for education and nutrition counseling regarding HTN and Weight Management .     Age: 58 y.o.  Wt:   Wt Readings from Last 10 Encounters:   01/10/24 82.9 kg (182 lb 12.2 oz)   11/28/23 81.8 kg (180 lb 5.4 oz)   11/07/23 80.5 kg (177 lb 7.5 oz)   10/16/23 81.7 kg (180 lb 1.9 oz)   10/12/23 82.3 kg (181 lb 7 oz)   08/09/23 80 kg (176 lb 5.9 oz)   07/06/23 74.8 kg (165 lb)   07/03/23 76.1 kg (167 lb 12.3 oz)   05/29/23 78.2 kg (172 lb 6.4 oz)   03/21/23 74.8 kg (165 lb)     Ht:   Ht Readings from Last 1 Encounters:   01/10/24 5' 5.98" (1.676 m)     BMI:   BMI Readings from Last 5 Encounters:   01/10/24 29.52 kg/m²   11/28/23 29.12 kg/m²   11/07/23 28.66 kg/m²   10/16/23 29.09 kg/m²   10/12/23 29.28 kg/m²     Client states:    1/23/24 Has lost and gained 7 pounds several times. Has been difficult to stay on track while visiting in-laws. Had little fiber and/or lean protein options available. Schedule has been thrown off due to medical needs of pet. Sometimes will eat breakfast then eat nothing until dinner 12 hours later. Has been doing great with getting at least 20 grams of protein at breakfast. Has been weight training at home 3 times per week sometimes around fueling times most of the time is not.  Discussed:  Tracking kcals/food or planning out days in [employer]  Getting protein after workouts  Reinforced protein sizes  Reinforced making better-for-you choices at restaurants  Getting a meal midday; if very busy carrying a high protein snack such as a protein bar  Resent meal plan and gave another copy of meal planning guide    11/7/23 She lost 70+ pounds on Optavia, kept weight off for 5 years +/- 5 pounds. Has recently gained back 20-30 pounds over past two years. She tried to do Optavia again, but found it too difficult to follow. Does not want to eat every 3ish hours, prefers 3 meals with lunch being the " largest meal. Is looking for a sustainable diet that allows flexibility for fun foods. Does not want to gain any additional weight back. Weight loss goal is 140 pounds.   Inbody done 10/12/23: SMM: 56.4 Visceral Fat: 17 BRM 1391  Discussed:  Goal for breakfast ~ 300 calories and 20 grams of protein  Non-animal protein sources  Getting adequate protein + fiber   Proper portion sizes of starches such as fruit, pasta, and rice  Importance of resistance training  Inbody done ~ every 6 weeks     Medical History  Problem List             Resolved    Mild intermittent asthma without complication (Chronic)         Mixed hyperlipidemia (Chronic)         Essential hypertension (Chronic)         Lichen sclerosus         Age-related osteoporosis, improved to osteopenia with alendronate (Chronic)         Overweight (BMI 25.0-29.9)         Prominent abdominal aortic pulsation         History of obesity            Past Medical History:   Diagnosis Date    Asthma     Osteoporosis        Past Surgical History:   Procedure Laterality Date    FINGER FRACTURE SURGERY Left     5th finger    HYSTERECTOMY      PARTIAL HYSTERECTOMY          Medications    Prior to Admission medications    Medication Sig Start Date End Date Taking? Authorizing Provider   albuterol (PROVENTIL/VENTOLIN HFA) 90 mcg/actuation inhaler Inhale 2 puffs into the lungs every 6 (six) hours as needed for Wheezing. Rescue 10/16/23 10/15/24  COY Epps MD   alendronate (FOSAMAX) 70 MG tablet Take 1 tablet (70 mg total) by mouth every 7 days. 7/3/23   Zaida Rangel PA-C   aspirin (ECOTRIN) 81 MG EC tablet Take 81 mg by mouth once daily.    Provider, Historical   Ca-D3-mag ox-zinc--rodney-bor 600 mg calcium- 20 mcg-50 mg Tab  9/1/20   Provider, Historical   hydroCHLOROthiazide (HYDRODIURIL) 12.5 MG Tab Take 12.5 mg by mouth. 4/10/23   Provider, Historical   inhalation spacing device Use as directed when taking inhaled medicine 10/16/23   COY Epps  MD [place]   mv-mn/iron/folic acid/herb 190 (VITAMIN D3 COMPLETE ORAL) Take by mouth.    Provider, Historical   phentermine (LOMAIRA) 8 mg Tab Take 1 po daily -bid prn appetite 10/13/23   Leticia Zhang MD   pravastatin (PRAVACHOL) 80 MG tablet  6/20/20   Provider, Historical   ramipril (ALTACE) 10 MG capsule TK ONE C PO  ONCE D 5/23/19   Provider, Historical     Vitamins, Minerals, and/or Supplements:  Over 50 MVI, Vitamin D with food     Social History    Marital status:      Social History     Tobacco Use    Smoking status: Never    Smokeless tobacco: Never   Substance Use Topics    Alcohol use: Yes     Comment: socially       Labs   Reviewed and noted      LIFESTYLE FACTORS    Dining out: 1-2 x per week, mostly restaurants  Types of restaurants/foods: varies    Frequency of consumption of fried foods: rare    Meal preparation/shopping: self and , do not cook very much    Sleep: fair  Hours: 6-8  Wake: 7 am  Sleep: 9 pm    Stress Level: moderate  Stress management: not discussed    Support System:  spouse    Physical Activity: Sedentary (little or no exercise)  Types of activity: none, contemplating joining [employer] and getting a   Frequency of activity: n/a    Food Allergies or Intolerances:  NKFAI      Beverages  Water: 64 ounces or more per day  Alcohol: rare, does like very sweet drinks  Coffee: none  Energy Drinks: none  Tea: none  Soda: 2 cans diet coke per day  Milk: skim milk  Juice: none  Other: none    24-hour Recall    Breakfast: by 8 am: greek yogurt + granola + berries // special k red berries or oat crunch + milk // cream cheese on rice cake // if very hungry will add egg whites with or without cheese  Lunch: biggest meal of the day around 1130/12 pm: grilled chicken + steamed veggies // optavia potatoes (~130 kcal) // salad with bryant/cucumber/cauliflower/bell pepper/ skinny girl dressing, light Urdu dressing, or avocado maría dressing sometimes adds sunflower  seeds or grilled chicken for protein  Dinner: lighter meal around 5-6 pm: skips // pizza // cottage cheese & berries // smaller version of lunch  Snacks: Does not usually snack      Diagnosis    Inadequate protein intake related to consumption of low protein foods and skipping meals as evidenced by 24-hour food recall.    Intervention    Estimated Energy Requirements:   Calories: 1550  Protein: 116 grams   Carbohydrates: 155 grams   Fat: 52 grams   Baseline for fluids: 100 ounces + sweat loss    Written Materials Provided  Meal Planning Guide with recommendations  Fueling Well On-the-Go Food Guide  Healthy Eating Plate  Eat Fit Shopping Guide  RD contact information    Goals:  1.  Eat at least 20 grams of protein at breakfast  2.  Follow healthy plate while traveling: fill 1/2 plate with non-starchy vegetables, 1/4 lean protein, 1/4 carbohydrates   3.  Add in resistance training at least 2x per week  4.  Get at least 100 grams of protein per day      Monitoring/Evaluation    Monitor the following:  Weight  Sleep  Stress Management  Movement    Communicated with healthcare provider and documented plan for referral to appropriate agency/healthcare provider as needed    Supervising Physician: Leticia Zhang MD    Patient motivation, anticipated barriers, expected compliance: Patient is motivated and has verbalized understanding and intent to comply.     Comprehension: fair     Follow-up: 8 weeks

## 2024-01-29 ENCOUNTER — OFFICE VISIT (OUTPATIENT)
Dept: OTOLARYNGOLOGY | Facility: CLINIC | Age: 59
End: 2024-01-29
Payer: COMMERCIAL

## 2024-01-29 VITALS — WEIGHT: 182.75 LBS | TEMPERATURE: 99 F | HEIGHT: 66 IN | BODY MASS INDEX: 29.37 KG/M2

## 2024-01-29 DIAGNOSIS — H61.21 IMPACTED CERUMEN OF RIGHT EAR: Primary | ICD-10-CM

## 2024-01-29 PROCEDURE — 69210 REMOVE IMPACTED EAR WAX UNI: CPT | Mod: S$GLB,,, | Performed by: PHYSICIAN ASSISTANT

## 2024-01-29 PROCEDURE — 99999 PR PBB SHADOW E&M-EST. PATIENT-LVL III: CPT | Mod: PBBFAC,,, | Performed by: PHYSICIAN ASSISTANT

## 2024-01-29 PROCEDURE — 99499 UNLISTED E&M SERVICE: CPT | Mod: 25,S$GLB,, | Performed by: PHYSICIAN ASSISTANT

## 2024-01-29 NOTE — PROGRESS NOTES
"Subjective:   Patient ID: Linette Schmidt is a 58 y.o. female.    Chief Complaint: Ear Fullness (Pt states she has bilateral ear fullness and tinnitus x weeks)    Patient is here to see me today for evaluation of a possible wax impaction in right ear.  She has complaints of hearing loss in the affected ears, but denies pain or drainage.  This has been an issue in the past.  The patient has not been using any sort of ear drop to soften the wax.       Review of patient's allergies indicates:  No Known Allergies        Review of Systems   Constitutional: Negative.    HENT:  Positive for ear discharge and hearing loss.    Eyes: Negative.    Respiratory: Negative.     Cardiovascular: Negative.    Gastrointestinal: Negative.    Endocrine: Negative.    Genitourinary: Negative.    Musculoskeletal: Negative.    Skin: Negative.    Allergic/Immunologic: Negative.    Neurological: Negative.    Hematological: Negative.    Psychiatric/Behavioral: Negative.           Objective:   Temp 98.6 °F (37 °C) (Temporal)   Ht 5' 6" (1.676 m)   Wt 82.9 kg (182 lb 12.2 oz)   BMI 29.50 kg/m²     Physical Exam  Constitutional:       General: She is not in acute distress.     Appearance: She is well-developed.   HENT:      Head: Normocephalic and atraumatic.      Right Ear: Tympanic membrane, ear canal and external ear normal. There is impacted cerumen.      Left Ear: Tympanic membrane, ear canal and external ear normal.      Nose: Nose normal. No nasal deformity, septal deviation, mucosal edema or rhinorrhea.      Right Sinus: No maxillary sinus tenderness or frontal sinus tenderness.      Left Sinus: No maxillary sinus tenderness or frontal sinus tenderness.      Mouth/Throat:      Mouth: Mucous membranes are not pale and not dry.      Dentition: No dental caries.      Pharynx: Uvula midline. No oropharyngeal exudate or posterior oropharyngeal erythema.   Eyes:      General: Lids are normal. No scleral icterus.     Extraocular " Movements:      Right eye: Normal extraocular motion and no nystagmus.      Left eye: Normal extraocular motion and no nystagmus.      Conjunctiva/sclera: Conjunctivae normal.      Right eye: Right conjunctiva is not injected. No chemosis.     Left eye: Left conjunctiva is not injected. No chemosis.     Pupils: Pupils are equal, round, and reactive to light.   Neck:      Thyroid: No thyroid mass or thyromegaly.      Trachea: Trachea and phonation normal. No tracheal tenderness or tracheal deviation.   Pulmonary:      Effort: Pulmonary effort is normal. No respiratory distress.      Breath sounds: No stridor.   Abdominal:      General: There is no distension.   Lymphadenopathy:      Head:      Right side of head: No submental, submandibular, preauricular, posterior auricular or occipital adenopathy.      Left side of head: No submental, submandibular, preauricular, posterior auricular or occipital adenopathy.      Cervical: No cervical adenopathy.   Skin:     General: Skin is warm and dry.      Findings: No erythema or rash.   Neurological:      Mental Status: She is alert and oriented to person, place, and time.      Cranial Nerves: No cranial nerve deficit.   Psychiatric:         Behavior: Behavior normal.          Procedure Note    CHIEF COMPLAINT:  Cerumen Impaction    Description:  The patient was seated in an exam chair.  An ear speculum was placed in the right EAC and was examined under the microscope.  Suction and/or loop curettes were used to remove a large cerumen impaction.  The tympanic membrane was visualized and was normal in appearance.  The procedure was repeated on the left side in a similar fashion.  The TM was intact and normal on this side as well.  The patient tolerated the procedure well.     Assessment:     1. Impacted cerumen of right ear        Plan:     Impacted cerumen of right ear     Cerumen impaction:  Removed today without difficulty.  I would recommend the use of a wax softening drop,  either over the counter Debrox or mineral oil, on a weekly basis.  I also instructed the patient to avoid Qtips. Tinnitus resolved with cleaning.       Answers submitted by the patient for this visit:  Review of Symptoms Questionnaire  (Submitted on 1/27/2024)

## 2024-02-04 ENCOUNTER — PATIENT MESSAGE (OUTPATIENT)
Dept: PRIMARY CARE CLINIC | Facility: CLINIC | Age: 59
End: 2024-02-04
Payer: COMMERCIAL

## 2024-02-07 ENCOUNTER — PATIENT MESSAGE (OUTPATIENT)
Dept: ADMINISTRATIVE | Facility: OTHER | Age: 59
End: 2024-02-07
Payer: COMMERCIAL

## 2024-03-07 NOTE — ASSESSMENT & PLAN NOTE
BP Readings from Last 6 Encounters:   07/06/23 108/71   07/03/23 116/77   03/21/23 118/74   09/29/22 108/70   08/11/22 112/72   07/06/22 106/73     Lab Results   Component Value Date    EGFRNORACEVR >60 09/29/2022    CREATININE 0.7 09/29/2022    BUN 22 (H) 09/29/2022    K 5.3 (H) 09/29/2022     09/29/2022     09/29/2022     Results for orders placed or performed during the hospital encounter of 09/29/22   EKG 12-lead    Collection Time: 09/29/22 10:16 AM    Narrative    Test Reason : Z00.00,    Vent. Rate : 072 BPM     Atrial Rate : 072 BPM     P-R Int : 114 ms          QRS Dur : 084 ms      QT Int : 414 ms       P-R-T Axes : 070 072 060 degrees     QTc Int : 453 ms    Normal sinus rhythm  Normal ECG  When compared with ECG of 22-JUN-2020 07:52,  No significant change was found  Confirmed by TRACY GOEL MD ([id number]) on 9/30/2022 7:27:07 AM    Referred By: SEFERINO HAMMONDS           Confirmed By:TRACY GOEL MD       Patient returned to room 3003 in bed after cardiac cath. Left groin angioseal intact, dressing dry. Bed placed in reverse trendelenburg and instructed pt to keep left leg straight. Pt stated understanding. Cont to monitor.

## 2024-03-20 ENCOUNTER — OFFICE VISIT (OUTPATIENT)
Dept: PRIMARY CARE CLINIC | Facility: CLINIC | Age: 59
End: 2024-03-20
Payer: COMMERCIAL

## 2024-03-20 VITALS
DIASTOLIC BLOOD PRESSURE: 78 MMHG | WEIGHT: 182.13 LBS | HEIGHT: 66 IN | TEMPERATURE: 97 F | HEART RATE: 78 BPM | BODY MASS INDEX: 29.27 KG/M2 | OXYGEN SATURATION: 99 % | SYSTOLIC BLOOD PRESSURE: 116 MMHG

## 2024-03-20 DIAGNOSIS — I10 ESSENTIAL HYPERTENSION: Primary | Chronic | ICD-10-CM

## 2024-03-20 DIAGNOSIS — E66.3 OVERWEIGHT (BMI 25.0-29.9): ICD-10-CM

## 2024-03-20 DIAGNOSIS — E78.2 MIXED HYPERLIPIDEMIA: Chronic | ICD-10-CM

## 2024-03-20 PROCEDURE — 99999 PR PBB SHADOW E&M-EST. PATIENT-LVL IV: CPT | Mod: PBBFAC,,, | Performed by: FAMILY MEDICINE

## 2024-03-20 PROCEDURE — 99214 OFFICE O/P EST MOD 30 MIN: CPT | Mod: S$GLB,,, | Performed by: FAMILY MEDICINE

## 2024-03-20 RX ORDER — TIRZEPATIDE 2.5 MG/.5ML
2.5 INJECTION, SOLUTION SUBCUTANEOUS
Qty: 4 PEN | Refills: 0 | Status: SHIPPED | OUTPATIENT
Start: 2024-03-20 | End: 2024-04-24 | Stop reason: SDUPTHER

## 2024-03-20 NOTE — PROGRESS NOTES
Subjective     Patient ID: Linette Schmidt is a 58 y.o. female.  LOV 1/24    Chief Complaint: follow up    On metformin. Thus far is tolerating.   Reviewed AOMs at last visit: only modest success with lomaira.  Glp 1 cost prohibitive. Considered zepbound.   Was able to see dietitian    Some stressors; MIL sick and dying (out of state); dogs are both sick.     Pt had lost weight and then regained. She has increased the metformin to 3 times/day.   Eating more, more cravings    Pt would like to try glp1 injection. Declines consideration of wellbutrin.       HPI       Objective     PAST MEDICAL HISTORY:  Past Medical History:   Diagnosis Date    Asthma     Osteoporosis          PAST SURGICAL HISTORY:  Past Surgical History:   Procedure Laterality Date    FINGER FRACTURE SURGERY Left     5th finger    HYSTERECTOMY      PARTIAL HYSTERECTOMY         FAMILY HISTORY:  Family History   Problem Relation Age of Onset    Hypertension Mother     Hypertension Father     Hypertension Sister     Breast cancer Neg Hx     Colon cancer Neg Hx     Ovarian cancer Neg Hx           SOCIAL HISTORY:  Social History     Social History Narrative    Not on file       MEDICATIONS:  Medications have been reviewed.    ALLERGIES:  Allergies have been reviewed.    Vitals:    03/20/24 1102   BP: 116/78   Pulse: 78   Temp: 96.8 °F (36 °C)     Wt Readings from Last 10 Encounters:   03/20/24 82.6 kg (182 lb 1.6 oz)   01/29/24 82.9 kg (182 lb 12.2 oz)   01/10/24 82.9 kg (182 lb 12.2 oz)   11/28/23 81.8 kg (180 lb 5.4 oz)   11/07/23 80.5 kg (177 lb 7.5 oz)   10/16/23 81.7 kg (180 lb 1.9 oz)   10/12/23 82.3 kg (181 lb 7 oz)   08/09/23 80 kg (176 lb 5.9 oz)   07/06/23 74.8 kg (165 lb)   07/03/23 76.1 kg (167 lb 12.3 oz)       Lab Results   Component Value Date    WBC 4.25 07/06/2023    HGB 13.1 07/06/2023    HCT 37.6 07/06/2023     07/06/2023    CHOL 139 07/06/2023    TRIG 79 07/06/2023    HDL 56 07/06/2023    ALT 15 07/06/2023    AST 21  07/06/2023     07/06/2023    K 4.2 07/06/2023     07/06/2023    CREATININE 0.8 07/06/2023    BUN 19 07/06/2023    CO2 29 07/06/2023    TSH 1.317 07/06/2023    HGBA1C 4.9 07/06/2023       Review of Systems   Constitutional:  Negative for activity change, appetite change, fatigue and fever.   HENT:  Negative for mouth dryness and goiter.    Eyes:  Negative for visual disturbance.   Respiratory:  Negative for apnea, cough, chest tightness and shortness of breath.    Cardiovascular:  Negative for chest pain, palpitations and leg swelling.   Gastrointestinal:  Negative for abdominal pain, constipation, diarrhea, nausea, vomiting and reflux.   Endocrine: Negative for cold intolerance, heat intolerance, polydipsia, polyphagia and polyuria.   Genitourinary:  Negative for frequency and menstrual problem.   Musculoskeletal:  Negative for arthralgias and myalgias.   Integumentary:  Negative for color change and rash.   Neurological:  Negative for dizziness, tremors, seizures, syncope, weakness, numbness and headaches.   Psychiatric/Behavioral:  Negative for self-injury, sleep disturbance and suicidal ideas. The patient is not nervous/anxious.        Physical Exam  Vitals and nursing note reviewed.   Constitutional:       General: She is not in acute distress.  HENT:      Head: Normocephalic and atraumatic.      Mouth/Throat:      Pharynx: Oropharynx is clear.   Eyes:      General: No scleral icterus.     Pupils: Pupils are equal, round, and reactive to light.   Neck:      Comments: No TM  Cardiovascular:      Rate and Rhythm: Normal rate and regular rhythm.      Pulses: Normal pulses.      Heart sounds: Normal heart sounds. No murmur heard.     No friction rub. No gallop.   Pulmonary:      Effort: Pulmonary effort is normal. No respiratory distress.      Breath sounds: Normal breath sounds. No wheezing, rhonchi or rales.   Abdominal:      General: Bowel sounds are normal. There is no distension.      Palpations:  Abdomen is soft.      Tenderness: There is no abdominal tenderness.   Musculoskeletal:         General: No swelling.      Cervical back: Normal range of motion and neck supple. No tenderness.   Lymphadenopathy:      Cervical: No cervical adenopathy.   Skin:     General: Skin is warm.      Findings: No erythema or rash.   Neurological:      Mental Status: She is alert and oriented to person, place, and time.   Psychiatric:         Mood and Affect: Mood normal.         Behavior: Behavior normal.              Assessment and Plan   1. Essential hypertension    2. Mixed hyperlipidemia    3. Overweight (BMI 25.0-29.9)    4. BMI 29.0-29.9,adult      Essential hypertension  -     tirzepatide, weight loss, (ZEPBOUND) 2.5 mg/0.5 mL PnIj; Inject 2.5 mg into the skin every 7 days.  Dispense: 4 Pen; Refill: 0    Mixed hyperlipidemia  -     tirzepatide, weight loss, (ZEPBOUND) 2.5 mg/0.5 mL PnIj; Inject 2.5 mg into the skin every 7 days.  Dispense: 4 Pen; Refill: 0    Overweight (BMI 25.0-29.9)    BMI 29.0-29.9,adult  -     tirzepatide, weight loss, (ZEPBOUND) 2.5 mg/0.5 mL PnIj; Inject 2.5 mg into the skin every 7 days.  Dispense: 4 Pen; Refill: 0  Pt qualifies for weight management med  Reviewed pathophys of met adaptation and weight gain   Stop metformin      Minimun: at least 64 grams/day; more is better  Water: at lease 64 oz water  At least 1000 valarie/day    Please let me know how you are doing    Failed 2 medications and structured program    Follow up in about 6 weeks (around 5/1/2024), or if symptoms worsen or fail to improve.  DW pt monitor bp    Risks/benefits/common side effects of medication discussed with patient at length. UTD patient handout given. (mychart msg)  D/W pt at length potential pharmacologic options; he desires trial zepbound. Risks/benefits/common side effects of zepbound d/w pt including nausea, constipation, and pain at injection site; he is aware should not get pregnant while taking and not approved  while breastfeeding. Pt has had hys. NO personal/fam hx of MEN or medullary thyroid cancer. No hx of pancreatitis. Instructed pt how to use with demo pen; pt practiced in office. Pt advised if approved will get pt handout from pharmacy. Pt has no hx of thyroid nodules or biliary disease/gallstones. DW pt with substantial or rapid weight loss gallstones could develop. Also dw pt glp-1 MOA and common side effects.     Still want to get adequate protein, water, fiber    Also reviewed with pt gastroparesis and potential for mental health changes. Notify provider immediately if any significant side effects or issues.

## 2024-03-20 NOTE — PATIENT INSTRUCTIONS
"Minimun: at least 64 grams/day; more is better  Water: at lease 64 oz water  At least 1000 valarie/day    Please let me know how you are doing     Stop metformin; keep in case need later.     Make pcp appt.    Zepbound; Yeni Olive       2024© UpToDate, Inc. and its affiliates and/or licensors. All Rights Reserved.  Access [employer] for additional drug information, tools, and databases.  Contributor Disclosures  For additional information see "Tirzepatide: Drug information"    You must carefully read the "Consumer Information Use and Disclaimer" below in order to understand and correctly use this information.  Brand Names: [place]  Mounjaro;     Zepbound    Brand Names: Isael  Mounjaro    Warning  This drug has been shown to cause thyroid cancer in some animals. It is not known if this happens in humans. If thyroid cancer happens, it may be deadly if not found and treated early. Call your doctor right away if you have a neck mass, trouble breathing, trouble swallowing, or have hoarseness that will not go away.  Do not use this drug if you have a health problem called Multiple Endocrine Neoplasia syndrome type 2 (MEN 2), or if you or a family member have had thyroid cancer.  Have your blood work checked and thyroid ultrasounds as you have been told by your doctor.    What is this drug used for?  It is used to lower blood sugar in people with type 2 diabetes.  It is used to help with weight loss in certain people.    What do I need to tell my doctor BEFORE I take this drug?  All products:  If you are allergic to this drug; any part of this drug; or any other drugs, foods, or substances. Tell your doctor about the allergy and what signs you had.  If you have ever had pancreatitis.  If you have stomach or bowel problems.  If you are using another drug that has the same drug in it.  If you are using another drug like this one. If you are not sure, ask your doctor or pharmacist.  If you are using this drug for diabetes:  If " you have type 1 diabetes. Do not use this drug to treat type 1 diabetes.  Zepbound:  If you have or have ever had depression or thoughts of suicide.  This is not a list of all drugs or health problems that interact with this drug.  Tell your doctor and pharmacist about all of your drugs (prescription or OTC, natural products, vitamins) and health problems. You must check to make sure that it is safe for you to take this drug with all of your drugs and health problems. Do not start, stop, or change the dose of any drug without checking with your doctor.    What are some things I need to know or do while I take this drug?  All products:  Tell all of your health care providers that you take this drug. This includes your doctors, nurses, pharmacists, and dentists.  Follow the diet and workout plan that your doctor told you about.  Talk with your doctor before you drink alcohol.  Birth control pills may not work as well to prevent pregnancy. If you take birth control pills, you may need to switch to another type of hormone-based birth control like a vaginal ring if your doctor tells you to. If another type of hormone-based birth control is not an option, use some other kind of birth control also, like a condom. Do this for 4 weeks after starting this drug and for 4 weeks each time the dose is raised.  This drug may prevent other drugs taken by mouth from getting into the body. If you take other drugs by mouth, you may need to take them at some other time than this drug. Talk with your doctor.  Do not share with another person even if the needle has been changed. Sharing your tray or pen may pass infections from one person to another. This includes infections you may not know you have.  If you cannot drink liquids by mouth or if you have upset stomach, throwing up, or diarrhea that does not go away; you need to avoid getting dehydrated. Contact your doctor to find out what to do. Dehydration may lead to low blood pressure  or to new or worse kidney problems.  A severe and sometimes deadly pancreas problem (pancreatitis) has happened with other drugs like this one.  If you are using this drug for diabetes:  It may be harder to control blood sugar during times of stress such as fever, infection, injury, or surgery. A change in physical activity, exercise, or diet may also affect blood sugar.  Check your blood sugar as you have been told by your doctor.  Do not drive if your blood sugar has been low. There is a greater chance of you having a crash.  Wear disease medical alert ID (identification).  Tell your doctor if you are pregnant, plan on getting pregnant, or are breast-feeding. You will need to talk about the benefits and risks to you and the baby.  Zepbound:  If you have high blood sugar (diabetes), you will need to watch your blood sugar closely.  Weight loss during pregnancy may cause harm to the unborn baby. If you get pregnant while taking this drug or if you want to get pregnant, call your doctor right away.  Tell your doctor if you are breast-feeding. You will need to talk about any risks to your baby.    What are some side effects that I need to call my doctor about right away?  WARNING/CAUTION: Even though it may be rare, some people may have very bad and sometimes deadly side effects when taking a drug. Tell your doctor or get medical help right away if you have any of the following signs or symptoms that may be related to a very bad side effect:  All products:  Signs of an allergic reaction, like rash; hives; itching; red, swollen, blistered, or peeling skin with or without fever; wheezing; tightness in the chest or throat; trouble breathing, swallowing, or talking; unusual hoarseness; or swelling of the mouth, face, lips, tongue, or throat.  Signs of kidney problems like unable to pass urine, change in how much urine is passed, blood in the urine, or a big weight gain.  Signs of gallbladder problems like pain in the  upper right belly area, right shoulder area, or between the shoulder blades; yellow skin or eyes; fever with chills; bloating; or very upset stomach or throwing up.  Signs of a pancreas problem (pancreatitis) like very bad stomach pain, very bad back pain, or very bad upset stomach or throwing up.  Dizziness or passing out.  A fast heartbeat.  Change in eyesight.  Low blood sugar can happen. The chance may be raised when this drug is used with other drugs for diabetes. Signs may be dizziness, headache, feeling sleepy or weak, shaking, fast heartbeat, confusion, hunger, or sweating. Call your doctor right away if you have any of these signs. Follow what you have been told to do for low blood sugar. This may include taking glucose tablets, liquid glucose, or some fruit juices.  Zepbound:  New or worse behavior or mood changes like depression or thoughts of suicide.    What are some other side effects of this drug?  All drugs may cause side effects. However, many people have no side effects or only have minor side effects. Call your doctor or get medical help if any of these side effects or any other side effects bother you or do not go away:  All products:  Constipation, diarrhea, stomach pain, upset stomach, throwing up, or decreased appetite.  Heartburn.  Pain, itching, or other irritation where the injection was given.  Zepbound:  Feeling tired or weak.  These are not all of the side effects that may occur. If you have questions about side effects, call your doctor. Call your doctor for medical advice about side effects.  You may report side effects to your national health agency.    How is this drug best taken?  Use this drug as ordered by your doctor. Read all information given to you. Follow all instructions closely.  All products:  It is given as a shot into the fatty part of the skin on the top of the thigh, belly area, or upper arm.  If you will be giving yourself the shot, your doctor or nurse will teach you  how to give the shot.  Keep taking this drug as you have been told by your doctor or other health care provider, even if you feel well.  Take the same day each week.  Move site where you give the shot each time.  Take with or without food.  Wash your hands before and after use.  Do not use if the solution is leaking or has particles.  This drug is colorless to a faint yellow. Do not use if the solution changes color.  Do not move this drug from the pen to a syringe.  Each pen or vial is for 1 use only. Throw away any part of the used pen after the dose is given.  Throw away needles in a needle/sharp disposal box. Do not reuse needles or other items. When the box is full, follow all local rules for getting rid of it. Talk with a doctor or pharmacist if you have any questions.  If you are using this drug for diabetes:  If you are also using insulin, you may inject this drug and the insulin in the same area of the body but not right next to each other.  Do not mix this drug in the same syringe with insulin.    What do I do if I miss a dose?  If it is within 4 days after the missed dose, take the missed dose and go back to your normal day.  If it has been more than 4 days since the missed dose, skip the missed dose and go back to your normal day.  Do not take 2 doses at the same time or extra doses.    How do I store and/or throw out this drug?  Store in a refrigerator. Do not freeze.  Do not use if it has been frozen.  If needed, each pen or vial may be stored at room temperature for up to 21 days. If you store at room temperature, throw away any part not used after 21 days.  Protect from heat.  Store in the original container to protect from light.  Keep all drugs in a safe place. Keep all drugs out of the reach of children and pets.  Throw away unused or  drugs. Do not flush down a toilet or pour down a drain unless you are told to do so. Check with your pharmacist if you have questions about the best way to  throw out drugs. There may be drug take-back programs in your area.    General drug facts  If your symptoms or health problems do not get better or if they become worse, call your doctor.  Do not share your drugs with others and do not take anyone else's drugs.  Some drugs may have another patient information leaflet. If you have any questions about this drug, please talk with your doctor, nurse, pharmacist, or other health care provider.  If you think there has been an overdose, call your poison control center or get medical care right away. Be ready to tell or show what was taken, how much, and when it happened.    Last Reviewed Date  2023-11-17  Consumer Information Use and Disclaimer  This generalized information is a limited summary of diagnosis, treatment, and/or medication information. It is not meant to be comprehensive and should be used as a tool to help the user understand and/or assess potential diagnostic and treatment options. It does NOT include all information about conditions, treatments, medications, side effects, or risks that may apply to a specific patient. It is not intended to be medical advice or a substitute for the medical advice, diagnosis, or treatment of a health care provider based on the health care provider's examination and assessment of a patient's specific and unique circumstances. Patients must speak with a health care provider for complete information about their health, medical questions, and treatment options, including any risks or benefits regarding use of medications. This information does not endorse any treatments or medications as safe, effective, or approved for treating a specific patient. UpToDate, Inc. and its affiliates disclaim any warranty or liability relating to this information or the use thereof. The use of this information is governed by the Terms of Use, available at https://www.wolterskluwer.com/en/know/clinical-effectiveness-terms.    © 2024 UpToDate, Inc.  and its affiliates and/or licensors. All rights reserved.  Use of UpToDate is subject to the Terms of Use.  Topic 860314 Version 17.0

## 2024-04-05 ENCOUNTER — PATIENT MESSAGE (OUTPATIENT)
Dept: PRIMARY CARE CLINIC | Facility: CLINIC | Age: 59
End: 2024-04-05
Payer: COMMERCIAL

## 2024-04-05 NOTE — TELEPHONE ENCOUNTER
Care Due:                  Date            Visit Type   Department     Provider  --------------------------------------------------------------------------------                                MYCHART                              FOLLOWUP/OF  [facility] INTERNAL  Last Visit: 11-      [facility] VISIT   MEDICINE       Abrahan Epps  Next Visit: None Scheduled  None         None Found                                                            Last  Test          Frequency    Reason                     Performed    Due Date  --------------------------------------------------------------------------------    Cr..........  12 months..  metFORMIN................  07- 06-    HBA1C.......  6 months...  metFORMIN, tirzepatide,..  07- 01-    [facility] Embedded Care Due Messages. Reference number: [id number].   4/05/2024 12:39:20 AM CDT

## 2024-04-06 NOTE — TELEPHONE ENCOUNTER
Refill Routing Note   Medication(s) are not appropriate for processing by [facility] for the following reason(s):      No active prescription written by provider    [facility] action(s):  Defer Care Due:  Labs due            Appointments  past 12m or future 3m with PCP    Date Provider   Last Visit   11/28/2023 COY Epps MD   Next Visit   Visit date not found COY Epps MD   ED visits in past 90 days: 0        Note composed:9:18 PM 04/05/2024

## 2024-04-15 ENCOUNTER — PATIENT MESSAGE (OUTPATIENT)
Dept: PRIMARY CARE CLINIC | Facility: CLINIC | Age: 59
End: 2024-04-15
Payer: COMMERCIAL

## 2024-04-24 ENCOUNTER — PATIENT MESSAGE (OUTPATIENT)
Dept: PRIMARY CARE CLINIC | Facility: CLINIC | Age: 59
End: 2024-04-24
Payer: COMMERCIAL

## 2024-04-24 DIAGNOSIS — I10 ESSENTIAL HYPERTENSION: Chronic | ICD-10-CM

## 2024-04-24 DIAGNOSIS — E78.2 MIXED HYPERLIPIDEMIA: Chronic | ICD-10-CM

## 2024-04-24 RX ORDER — HYDROCHLOROTHIAZIDE 12.5 MG/1
12.5 TABLET ORAL
Qty: 90 TABLET | Refills: 0 | Status: SHIPPED | OUTPATIENT
Start: 2024-04-24 | End: 2024-04-25

## 2024-04-24 RX ORDER — TIRZEPATIDE 2.5 MG/.5ML
2.5 INJECTION, SOLUTION SUBCUTANEOUS
Qty: 4 PEN | Refills: 0 | Status: SHIPPED | OUTPATIENT
Start: 2024-04-24 | End: 2024-05-23 | Stop reason: SDUPTHER

## 2024-04-24 NOTE — TELEPHONE ENCOUNTER
REFILL APPROVED. Will address further refills at upcoming appointment with me listed below.  #LMRX   --------------------------------  Future Appointments   Date Time Provider Department Center   5/20/2024  4:00 PM Leticia Zhang MD [facility] [place]   7/9/2024  7:30 AM [facility] BMD1 ON DEXABMD  [facility]   7/9/2024  8:20 AM LABORATORY, [facility]   7/9/2024  9:20 AM [facility] MAMMO2 ONL [facility]   7/10/2024  8:00 AM Breezy Crump MD [facility] RHEU  [facility]

## 2024-04-24 NOTE — TELEPHONE ENCOUNTER
Care Due:                  Date            Visit Type   Department     Provider  --------------------------------------------------------------------------------                                MYCHART                              FOLLOWUP/OF  [facility] INTER[facility]  Last Visit: 11-      [facility] VISIT   MEDICINE       Abrahan Epps  Next Visit: None Scheduled  None         None Found                                                            Last  Test          Frequency    Reason                     Performed    Due Date  --------------------------------------------------------------------------------    CMP.........  12 months..  hydroCHLOROthiazide,       07- 06-                             metFORMIN................    [employer] Embedded Care Due Messages. Reference number: [id number].   4/24/2024 9:40:33 AM CDT

## 2024-04-25 RX ORDER — HYDROCHLOROTHIAZIDE 12.5 MG/1
12.5 TABLET ORAL DAILY
Qty: 90 TABLET | Refills: 0 | Status: SHIPPED | OUTPATIENT
Start: 2024-04-25

## 2024-04-25 NOTE — TELEPHONE ENCOUNTER
Transmission to [facility] pharmacy #[id number] failed (4/24/2024 8:43 AM CDT); will re-issue to patient's requested pharmacy.

## 2024-05-23 ENCOUNTER — OFFICE VISIT (OUTPATIENT)
Dept: PRIMARY CARE CLINIC | Facility: CLINIC | Age: 59
End: 2024-05-23
Payer: COMMERCIAL

## 2024-05-23 VITALS
DIASTOLIC BLOOD PRESSURE: 80 MMHG | SYSTOLIC BLOOD PRESSURE: 120 MMHG | WEIGHT: 173.75 LBS | BODY MASS INDEX: 27.92 KG/M2 | HEIGHT: 66 IN | RESPIRATION RATE: 18 BRPM | TEMPERATURE: 97 F | HEART RATE: 84 BPM | OXYGEN SATURATION: 98 %

## 2024-05-23 DIAGNOSIS — E78.2 MIXED HYPERLIPIDEMIA: ICD-10-CM

## 2024-05-23 DIAGNOSIS — Z86.39 HISTORY OF OBESITY: ICD-10-CM

## 2024-05-23 DIAGNOSIS — I10 ESSENTIAL HYPERTENSION: ICD-10-CM

## 2024-05-23 PROCEDURE — 99999 PR PBB SHADOW E&M-EST. PATIENT-LVL V: CPT | Mod: PBBFAC,,, | Performed by: FAMILY MEDICINE

## 2024-05-23 PROCEDURE — 99214 OFFICE O/P EST MOD 30 MIN: CPT | Mod: S$GLB,,, | Performed by: FAMILY MEDICINE

## 2024-05-23 RX ORDER — TIRZEPATIDE 5 MG/.5ML
5 INJECTION, SOLUTION SUBCUTANEOUS
Qty: 4 PEN | Refills: 0 | Status: SHIPPED | OUTPATIENT
Start: 2024-05-23

## 2024-05-23 RX ORDER — TIRZEPATIDE 2.5 MG/.5ML
2.5 INJECTION, SOLUTION SUBCUTANEOUS
Qty: 4 PEN | Refills: 0 | Status: SHIPPED | OUTPATIENT
Start: 2024-05-23

## 2024-05-23 NOTE — PROGRESS NOTES
Subjective     Patient ID: Linette Schmidt is a 59 y.o. female.    Chief Complaint: follow up    LOV 3/24  Only modest success with lomaira.   Glp1 too costly but reconsidered zepbound. She has been on 2.5 mg x 2 months.     Several stressors. MIL did pass away.     She has lost 9 lb. Reports gained weight when was at [facility] last week. She does get good satiety feedback with the zepbound. Feels satisfied with highly palatable foods. Prioritizing protein.     Was able to see nutrition.   Added yogurt to regimen.   Tried CashStar; stopped b/c was boring.   She bought some weights/light (rec'd to her 20 min twice per week). Reviewed with pt need to preserve muscle mass.     BP stable    Pt reports doing well overall.   No throat pain, hoarseness, palpable neck mass  Denies compressive symptoms  No interval change in fam hx  No personal/fam hx of MEN2 or medullary thyroid cancer  No personal hx of pancreatitis  Nausea: none  Abd pain: none   Constipation: had mild; improved with fiber gummies  Gastroparesis: none  GERD: none   Energy: good  Lifestyle changes: as above  Mental Health: pt states good; no acute changes    Contraception:  NA      No hx of IR.         HPI       Objective     PAST MEDICAL HISTORY:  Past Medical History:   Diagnosis Date    Asthma     Osteoporosis          PAST SURGICAL HISTORY:  Past Surgical History:   Procedure Laterality Date    FINGER FRACTURE SURGERY Left     5th finger    HYSTERECTOMY      PARTIAL HYSTERECTOMY         FAMILY HISTORY:  Family History   Problem Relation Name Age of Onset    Hypertension Mother      Hypertension Father      Hypertension Sister      Breast cancer Neg Hx      Colon cancer Neg Hx      Ovarian cancer Neg Hx            SOCIAL HISTORY:  Social History     Social History Narrative    Not on file       MEDICATIONS:  Medications have been reviewed.    ALLERGIES:  Allergies have been reviewed.    Vitals:    05/23/24 0801   BP: 120/80   Pulse: 84    Resp: 18   Temp: 97 °F (36.1 °C)     Wt Readings from Last 10 Encounters:   05/23/24 78.8 kg (173 lb 11.6 oz)   03/20/24 82.6 kg (182 lb 1.6 oz)   01/29/24 82.9 kg (182 lb 12.2 oz)   01/10/24 82.9 kg (182 lb 12.2 oz)   11/28/23 81.8 kg (180 lb 5.4 oz)   11/07/23 80.5 kg (177 lb 7.5 oz)   10/16/23 81.7 kg (180 lb 1.9 oz)   10/12/23 82.3 kg (181 lb 7 oz)   08/09/23 80 kg (176 lb 5.9 oz)   07/06/23 74.8 kg (165 lb)       Lab Results   Component Value Date    WBC 4.25 07/06/2023    HGB 13.1 07/06/2023    HCT 37.6 07/06/2023     07/06/2023    CHOL 139 07/06/2023    TRIG 79 07/06/2023    HDL 56 07/06/2023    ALT 15 07/06/2023    AST 21 07/06/2023     07/06/2023    K 4.2 07/06/2023     07/06/2023    CREATININE 0.8 07/06/2023    BUN 19 07/06/2023    CO2 29 07/06/2023    TSH 1.317 07/06/2023    HGBA1C 4.9 07/06/2023       Review of Systems   Constitutional:  Negative for activity change, appetite change, fatigue and fever.   HENT:  Negative for mouth dryness and goiter.    Eyes:  Negative for visual disturbance.   Respiratory:  Negative for apnea, cough, chest tightness and shortness of breath.    Cardiovascular:  Negative for chest pain, palpitations and leg swelling.   Gastrointestinal:  Negative for abdominal pain, constipation, diarrhea, nausea, vomiting and reflux.   Endocrine: Negative for cold intolerance, heat intolerance, polydipsia, polyphagia and polyuria.   Genitourinary:  Negative for frequency and menstrual problem.   Musculoskeletal:  Negative for arthralgias and myalgias.   Integumentary:  Negative for color change and rash.   Neurological:  Negative for dizziness, seizures, syncope, weakness, numbness, headaches and memory loss.   Psychiatric/Behavioral:  Negative for self-injury, sleep disturbance and suicidal ideas. The patient is not nervous/anxious.        Physical Exam  Vitals and nursing note reviewed.   Constitutional:       General: She is not in acute distress.  HENT:      Head:  Normocephalic and atraumatic.      Mouth/Throat:      Pharynx: Oropharynx is clear.   Eyes:      General: No scleral icterus.     Pupils: Pupils are equal, round, and reactive to light.   Neck:      Comments: No TM  Cardiovascular:      Rate and Rhythm: Normal rate and regular rhythm.      Pulses: Normal pulses.      Heart sounds: Normal heart sounds. No murmur heard.     No friction rub. No gallop.   Pulmonary:      Effort: Pulmonary effort is normal. No respiratory distress.      Breath sounds: Normal breath sounds. No wheezing, rhonchi or rales.   Abdominal:      General: Bowel sounds are normal. There is no distension.      Palpations: Abdomen is soft.      Tenderness: There is no abdominal tenderness.   Musculoskeletal:         General: No swelling.      Cervical back: Normal range of motion and neck supple. No tenderness.   Lymphadenopathy:      Cervical: No cervical adenopathy.   Skin:     General: Skin is warm.      Findings: No erythema or rash.   Neurological:      Mental Status: She is alert and oriented to person, place, and time.   Psychiatric:         Mood and Affect: Mood normal.         Behavior: Behavior normal.              Assessment and Plan     1. Essential hypertension    2. BMI 28.0-28.9,adult    3. History of obesity    4. Mixed hyperlipidemia    5. BMI 29.0-29.9,adult      Essential hypertension  Chronic/stable  Pt is usually holding hctz--    BMI 28.0-28.9,adult  -     tirzepatide, weight loss, (ZEPBOUND) 5 mg/0.5 mL PnIj; Inject 5 mg into the skin every 7 days.  Dispense: 4 Pen; Refill: 0  -     tirzepatide, weight loss, (ZEPBOUND) 2.5 mg/0.5 mL PnIj; Inject 2.5 mg into the skin every 7 days.  Dispense: 4 Pen; Refill: 0    History of obesity  -     tirzepatide, weight loss, (ZEPBOUND) 5 mg/0.5 mL PnIj; Inject 5 mg into the skin every 7 days.  Dispense: 4 Pen; Refill: 0    Mixed hyperlipidemia  -     tirzepatide, weight loss, (ZEPBOUND) 5 mg/0.5 mL PnIj; Inject 5 mg into the skin every 7  days.  Dispense: 4 Pen; Refill: 0  -     tirzepatide, weight loss, (ZEPBOUND) 2.5 mg/0.5 mL PnIj; Inject 2.5 mg into the skin every 7 days.  Dispense: 4 Pen; Refill: 0  Trying 5 mg; back up 2.5 mg rx given  Pt to send update  Reviewed with pt risks/benefits/se's       Pt made aware I will be leaving [facility] in June. She has established pcp so have advised her f/u with them this summer. Pt advised to f/u with Primary Care--make appt this summer    Also reviewed potential external community options.     Follow up in about 4 weeks (around 6/20/2024), or if symptoms worsen or fail to improve.

## 2024-05-23 NOTE — PATIENT INSTRUCTIONS
"      2024© UpToDate, Inc. and its affiliates and/or licensors. All Rights Reserved.  Access [employer] for additional drug information, tools, and databases.  Contributor Disclosures  For additional information see "Tirzepatide: Drug information"    You must carefully read the "Consumer Information Use and Disclaimer" below in order to understand and correctly use this information.  Brand Names: [place]  Mounjaro;     Zepbound    Brand Names: Isael  Mounjaro    Warning  This drug has been shown to cause thyroid cancer in some animals. It is not known if this happens in humans. If thyroid cancer happens, it may be deadly if not found and treated early. Call your doctor right away if you have a neck mass, trouble breathing, trouble swallowing, or have hoarseness that will not go away.  Do not use this drug if you have a health problem called Multiple Endocrine Neoplasia syndrome type 2 (MEN 2), or if you or a family member have had thyroid cancer.  Have your blood work checked and thyroid ultrasounds as you have been told by your doctor.    What is this drug used for?  It is used to lower blood sugar in people with type 2 diabetes.  It is used to help with weight loss in certain people.    What do I need to tell my doctor BEFORE I take this drug?  All products:  If you are allergic to this drug; any part of this drug; or any other drugs, foods, or substances. Tell your doctor about the allergy and what signs you had.  If you have ever had pancreatitis.  If you have stomach or bowel problems.  If you are using another drug that has the same drug in it.  If you are using another drug like this one. If you are not sure, ask your doctor or pharmacist.  If you are using this drug for diabetes:  If you have type 1 diabetes. Do not use this drug to treat type 1 diabetes.  Zepbound:  If you have or have ever had depression or thoughts of suicide.  This is not a list of all drugs or health problems that interact with this " drug.  Tell your doctor and pharmacist about all of your drugs (prescription or OTC, natural products, vitamins) and health problems. You must check to make sure that it is safe for you to take this drug with all of your drugs and health problems. Do not start, stop, or change the dose of any drug without checking with your doctor.    What are some things I need to know or do while I take this drug?  All products:  Tell all of your health care providers that you take this drug. This includes your doctors, nurses, pharmacists, and dentists.  Follow the diet and workout plan that your doctor told you about.  Talk with your doctor before you drink alcohol.  Birth control pills may not work as well to prevent pregnancy. If you take birth control pills, you may need to switch to another type of hormone-based birth control like a vaginal ring if your doctor tells you to. If another type of hormone-based birth control is not an option, use some other kind of birth control also, like a condom. Do this for 4 weeks after starting this drug and for 4 weeks each time the dose is raised.  This drug may prevent other drugs taken by mouth from getting into the body. If you take other drugs by mouth, you may need to take them at some other time than this drug. Talk with your doctor.  Do not share with another person even if the needle has been changed. Sharing your tray or pen may pass infections from one person to another. This includes infections you may not know you have.  If you cannot drink liquids by mouth or if you have upset stomach, throwing up, or diarrhea that does not go away, you need to avoid getting dehydrated. Contact your doctor to find out what to do. Dehydration may lead to low blood pressure or to new or worsening kidney problems.  A severe and sometimes deadly pancreas problem (pancreatitis) has happened with other drugs like this one.  If you are using this drug for diabetes:  It may be harder to control blood  sugar during times of stress such as fever, infection, injury, or surgery. A change in physical activity, exercise, or diet may also affect blood sugar.  Check your blood sugar as you have been told by your doctor.  Do not drive if your blood sugar has been low. There is a greater chance of you having a crash.  Wear disease medical alert ID (identification).  Tell your doctor if you are pregnant, plan on getting pregnant, or are breast-feeding. You will need to talk about the benefits and risks to you and the baby.  Zepbound:  If you have high blood sugar (diabetes), you will need to watch your blood sugar closely.  Weight loss during pregnancy may cause harm to the unborn baby. If you get pregnant while taking this drug or if you want to get pregnant, call your doctor right away.  Tell your doctor if you are breast-feeding. You will need to talk about any risks to your baby.    What are some side effects that I need to call my doctor about right away?  WARNING/CAUTION: Even though it may be rare, some people may have very bad and sometimes deadly side effects when taking a drug. Tell your doctor or get medical help right away if you have any of the following signs or symptoms that may be related to a very bad side effect:  All products:  Signs of an allergic reaction, like rash; hives; itching; red, swollen, blistered, or peeling skin with or without fever; wheezing; tightness in the chest or throat; trouble breathing, swallowing, or talking; unusual hoarseness; or swelling of the mouth, face, lips, tongue, or throat.  Signs of kidney problems like unable to pass urine, change in how much urine is passed, blood in the urine, or a big weight gain.  Signs of gallbladder problems like pain in the upper right belly area, right shoulder area, or between the shoulder blades; yellow skin or eyes; fever with chills; bloating; or very upset stomach or throwing up.  Signs of a pancreas problem (pancreatitis) like very bad  stomach pain, very bad back pain, or very bad upset stomach or throwing up.  Dizziness or passing out.  A fast heartbeat.  Change in eyesight.  Low blood sugar can happen. The chance may be raised when this drug is used with other drugs for diabetes. Signs may be dizziness, headache, feeling sleepy or weak, shaking, fast heartbeat, confusion, hunger, or sweating. Call your doctor right away if you have any of these signs. Follow what you have been told to do for low blood sugar. This may include taking glucose tablets, liquid glucose, or some fruit juices.  Zepbound:  New or worse behavior or mood changes like depression or thoughts of suicide.    What are some other side effects of this drug?  All drugs may cause side effects. However, many people have no side effects or only have minor side effects. Call your doctor or get medical help if any of these side effects or any other side effects bother you or do not go away:  All products:  Constipation, diarrhea, stomach pain, upset stomach, throwing up, or decreased appetite.  Heartburn.  Pain, itching, or other irritation where the injection was given.  Zepbound:  Feeling tired or weak.  Hair loss.  These are not all of the side effects that may occur. If you have questions about side effects, call your doctor. Call your doctor for medical advice about side effects.  You may report side effects to your national health agency.    How is this drug best taken?  Use this drug as ordered by your doctor. Read all information given to you. Follow all instructions closely.  All products:  It is given as a shot into the fatty part of the skin on the top of the thigh, belly area, or upper arm.  If you will be giving yourself the shot, your doctor or nurse will teach you how to give the shot.  Keep taking this drug as you have been told by your doctor or other health care provider, even if you feel well.  Take the same day each week.  Move site where you give the shot each  time.  Take with or without food.  Wash your hands before and after use.  Do not use if the solution is leaking or has particles.  This drug is colorless to a faint yellow. Do not use if the solution changes color.  Do not move this drug from the pen to a syringe.  Each pen or vial is for 1 use only. Throw away any part of the used pen after the dose is given.  Throw away needles in a needle/sharp disposal box. Do not reuse needles or other items. When the box is full, follow all local rules for getting rid of it. Talk with a doctor or pharmacist if you have any questions.  Vials:  It is important to have the right syringe to measure your dose. If you do not have the right syringe or you are not sure, talk with your pharmacist.  If you are using this drug for diabetes:  If you are also using insulin, you may inject this drug and the insulin in the same area of the body but not right next to each other.  Do not mix this drug in the same syringe with insulin.    What do I do if I miss a dose?  If it is within 4 days after the missed dose, take the missed dose and go back to your normal day.  If it has been more than 4 days since the missed dose, skip the missed dose and go back to your normal day.  Do not take 2 doses at the same time or extra doses.    How do I store and/or throw out this drug?  Store in a refrigerator. Do not freeze.  Do not use if it has been frozen.  If needed, each pen or vial may be stored at room temperature for up to 21 days. If you store at room temperature, throw away any part not used after 21 days.  Protect from heat.  Store in the original container to protect from light.  Keep all drugs in a safe place. Keep all drugs out of the reach of children and pets.  Throw away unused or  drugs. Do not flush down a toilet or pour down a drain unless you are told to do so. Check with your pharmacist if you have questions about the best way to throw out drugs. There may be drug take-back  programs in your area.    General drug facts  If your symptoms or health problems do not get better or if they become worse, call your doctor.  Do not share your drugs with others and do not take anyone else's drugs.  Some drugs may have another patient information leaflet. If you have any questions about this drug, please talk with your doctor, nurse, pharmacist, or other health care provider.  If you think there has been an overdose, call your poison control center or get medical care right away. Be ready to tell or show what was taken, how much, and when it happened.    Last Reviewed Date  2024-04-17  Consumer Information Use and Disclaimer  This generalized information is a limited summary of diagnosis, treatment, and/or medication information. It is not meant to be comprehensive and should be used as a tool to help the user understand and/or assess potential diagnostic and treatment options. It does NOT include all information about conditions, treatments, medications, side effects, or risks that may apply to a specific patient. It is not intended to be medical advice or a substitute for the medical advice, diagnosis, or treatment of a health care provider based on the health care provider's examination and assessment of a patient's specific and unique circumstances. Patients must speak with a health care provider for complete information about their health, medical questions, and treatment options, including any risks or benefits regarding use of medications. This information does not endorse any treatments or medications as safe, effective, or approved for treating a specific patient. UpToDate, Inc. and its affiliates disclaim any warranty or liability relating to this information or the use thereof. The use of this information is governed by the Terms of Use, available at https://www.wolterskluwer.com/en/know/clinical-effectiveness-terms.    © 2024 [employer]ToDate, Inc. and its affiliates and/or licensors. All  rights reserved.  Use of [employer]Date is subject to the Terms of Use.

## 2024-06-20 ENCOUNTER — OFFICE VISIT (OUTPATIENT)
Dept: PRIMARY CARE CLINIC | Facility: CLINIC | Age: 59
End: 2024-06-20
Payer: COMMERCIAL

## 2024-06-20 VITALS
HEIGHT: 66 IN | SYSTOLIC BLOOD PRESSURE: 116 MMHG | DIASTOLIC BLOOD PRESSURE: 72 MMHG | OXYGEN SATURATION: 98 % | BODY MASS INDEX: 27.17 KG/M2 | TEMPERATURE: 98 F | HEART RATE: 90 BPM | WEIGHT: 169.06 LBS

## 2024-06-20 DIAGNOSIS — E78.2 MIXED HYPERLIPIDEMIA: ICD-10-CM

## 2024-06-20 DIAGNOSIS — I10 ESSENTIAL HYPERTENSION: Chronic | ICD-10-CM

## 2024-06-20 DIAGNOSIS — Z86.39 HISTORY OF OBESITY: ICD-10-CM

## 2024-06-20 PROCEDURE — 99999 PR PBB SHADOW E&M-EST. PATIENT-LVL IV: CPT | Mod: PBBFAC,,, | Performed by: FAMILY MEDICINE

## 2024-06-20 PROCEDURE — 99213 OFFICE O/P EST LOW 20 MIN: CPT | Mod: S$GLB,,, | Performed by: FAMILY MEDICINE

## 2024-06-20 RX ORDER — TIRZEPATIDE 2.5 MG/.5ML
2.5 INJECTION, SOLUTION SUBCUTANEOUS
Qty: 4 PEN | Refills: 0 | Status: SHIPPED | OUTPATIENT
Start: 2024-06-20

## 2024-06-20 RX ORDER — TIRZEPATIDE 5 MG/.5ML
5 INJECTION, SOLUTION SUBCUTANEOUS
Qty: 4 PEN | Refills: 1 | Status: SHIPPED | OUTPATIENT
Start: 2024-06-20

## 2024-06-20 NOTE — PROGRESS NOTES
Subjective     Patient ID: Linette Schmidt is a 59 y.o. female.    Chief Complaint: follow up    Pt has been on 5 mg of zepbound.   Failed prior phentermine.   She has better satiety cues; feels more food freedom.     Weight down 13 lb.      Protein consumption:  adequate  Resistance/strength training: little; plans to improve  Goal 20 min twice a week  Pt reports doing well overall.   No throat pain, hoarseness, palpable neck mass  Denies compressive symptoms  No hx of pancreatitis  No fam hx of pancreatitic cancer  Pt still has gallbladder   No personal/fam hx of MEN-2 or medullary thyroid ca  No interval change in fam hx  Nausea: limited as above  Abd pain: none   Constipation: none using fiber gummies  Gastroparesis: none  GERD: none   Energy: good  Lifestyle changes: as above  Mental Health: pt states good; no acute changes    Contraception:  NA    HPI       Objective     PAST MEDICAL HISTORY:  Past Medical History:   Diagnosis Date    Asthma     Osteoporosis          PAST SURGICAL HISTORY:  Past Surgical History:   Procedure Laterality Date    FINGER FRACTURE SURGERY Left     5th finger    HYSTERECTOMY      PARTIAL HYSTERECTOMY         FAMILY HISTORY:  Family History   Problem Relation Name Age of Onset    Hypertension Mother      Hypertension Father      Hypertension Sister      Breast cancer Neg Hx      Colon cancer Neg Hx      Ovarian cancer Neg Hx            SOCIAL HISTORY:  Social History     Social History Narrative    Not on file       MEDICATIONS:  Medications have been reviewed.    ALLERGIES:  Allergies have been reviewed.    Vitals:    06/20/24 0954   BP: 116/72   Pulse: 90   Temp: 97.5 °F (36.4 °C)     Wt Readings from Last 10 Encounters:   06/20/24 76.7 kg (169 lb 1.5 oz)   05/23/24 78.8 kg (173 lb 11.6 oz)   03/20/24 82.6 kg (182 lb 1.6 oz)   01/29/24 82.9 kg (182 lb 12.2 oz)   01/10/24 82.9 kg (182 lb 12.2 oz)   11/28/23 81.8 kg (180 lb 5.4 oz)   11/07/23 80.5 kg (177 lb 7.5 oz)   10/16/23  81.7 kg (180 lb 1.9 oz)   10/12/23 82.3 kg (181 lb 7 oz)   08/09/23 80 kg (176 lb 5.9 oz)       Lab Results   Component Value Date    WBC 4.25 07/06/2023    HGB 13.1 07/06/2023    HCT 37.6 07/06/2023     07/06/2023    CHOL 139 07/06/2023    TRIG 79 07/06/2023    HDL 56 07/06/2023    ALT 15 07/06/2023    AST 21 07/06/2023     07/06/2023    K 4.2 07/06/2023     07/06/2023    CREATININE 0.8 07/06/2023    BUN 19 07/06/2023    CO2 29 07/06/2023    TSH 1.317 07/06/2023    HGBA1C 4.9 07/06/2023       Review of Systems   Constitutional:  Negative for activity change, appetite change, fatigue and fever.   HENT:  Negative for mouth dryness, trouble swallowing, voice change and goiter.    Eyes:  Negative for visual disturbance.   Respiratory:  Negative for apnea, cough, chest tightness and shortness of breath.    Cardiovascular:  Negative for chest pain, palpitations and leg swelling.   Gastrointestinal:  Negative for abdominal pain, constipation, diarrhea, nausea, vomiting and reflux.   Endocrine: Negative for cold intolerance, heat intolerance, polydipsia, polyphagia and polyuria.   Genitourinary:  Negative for frequency and menstrual problem.   Musculoskeletal:  Negative for arthralgias and myalgias.   Integumentary:  Negative for color change and rash.   Neurological:  Negative for dizziness, vertigo, tremors, seizures, weakness and numbness.   Psychiatric/Behavioral:  Negative for self-injury, sleep disturbance and suicidal ideas. The patient is not nervous/anxious.        Physical Exam  Vitals and nursing note reviewed.   Constitutional:       General: She is not in acute distress.  HENT:      Head: Normocephalic and atraumatic.      Mouth/Throat:      Pharynx: Oropharynx is clear.   Eyes:      General: No scleral icterus.     Pupils: Pupils are equal, round, and reactive to light.   Neck:      Comments: No TM  Cardiovascular:      Rate and Rhythm: Normal rate and regular rhythm.      Pulses: Normal  pulses.      Heart sounds: Normal heart sounds. No murmur heard.     No friction rub. No gallop.   Pulmonary:      Effort: Pulmonary effort is normal. No respiratory distress.      Breath sounds: Normal breath sounds. No wheezing, rhonchi or rales.   Abdominal:      General: Bowel sounds are normal. There is no distension.      Palpations: Abdomen is soft.      Tenderness: There is no abdominal tenderness.   Musculoskeletal:         General: No swelling.      Cervical back: Normal range of motion and neck supple. No tenderness.   Lymphadenopathy:      Cervical: No cervical adenopathy.   Skin:     General: Skin is warm.      Findings: No erythema or rash.   Neurological:      Mental Status: She is alert and oriented to person, place, and time.   Psychiatric:         Mood and Affect: Mood normal.         Behavior: Behavior normal.              Assessment and Plan     1. Essential hypertension    2. BMI 27.0-27.9,adult    3. BMI 28.0-28.9,adult    4. Mixed hyperlipidemia    5. History of obesity      Essential hypertension  -     tirzepatide, weight loss, (ZEPBOUND) 2.5 mg/0.5 mL PnIj; Inject 2.5 mg into the skin every 7 days.  Dispense: 4 Pen; Refill: 0  -     tirzepatide, weight loss, (ZEPBOUND) 5 mg/0.5 mL PnIj; Inject 5 mg into the skin every 7 days.  Dispense: 4 Pen; Refill: 1    BMI 27.0-27.9,adult  -     tirzepatide, weight loss, (ZEPBOUND) 2.5 mg/0.5 mL PnIj; Inject 2.5 mg into the skin every 7 days.  Dispense: 4 Pen; Refill: 0  -     tirzepatide, weight loss, (ZEPBOUND) 5 mg/0.5 mL PnIj; Inject 5 mg into the skin every 7 days.  Dispense: 4 Pen; Refill: 1    BMI 28.0-28.9,adult    Mixed hyperlipidemia  -     tirzepatide, weight loss, (ZEPBOUND) 2.5 mg/0.5 mL PnIj; Inject 2.5 mg into the skin every 7 days.  Dispense: 4 Pen; Refill: 0  -     tirzepatide, weight loss, (ZEPBOUND) 5 mg/0.5 mL PnIj; Inject 5 mg into the skin every 7 days.  Dispense: 4 Pen; Refill: 1    History of obesity  -     tirzepatide, weight  loss, (ZEPBOUND) 5 mg/0.5 mL PnIj; Inject 5 mg into the skin every 7 days.  Dispense: 4 Pen; Refill: 1      **will continue 5 mg dosage now and have given one paper one for 2.5 mg in case of supply/availability issues.          Pt made aware I will be leaving [facility] in June. She has established pcp so have advised her f/u with them this summer.   Also reviewed potential external community options per pt request.    Reviewed with pt at length risks, benefits, se's of semaglutide/tirzepatide including monitoring abd pain (particularly epigastric and/or right upper quadrant), gallstones, pancreatitis, any mood changes, development of thyroid nodules as well as gastroparesis. Pt expressed understanding.      Pt advised if any abd pain to seek care immediately and do not resume medication if has any pancreatitis or other complications. She will need to seek advise of future physician. Also reviewed long term implications and need for care for long term planning and medication adjustment.    Also reviewed with pt individual protein goals and need to preserve lean muscle mass. Rec'd resistance training 20 min 2x/week at least.       Pt is aware zepbound is approximately 2  years old; should have any safety issues that arise dw provider    Follow up if symptoms worsen or fail to improve.

## 2024-06-20 NOTE — PATIENT INSTRUCTIONS
"Please make sure to make a primary care appointment for your annual  Make sure to have labs      2024© [employer] and its affiliates and/or licensors. All Rights Reserved.  Access [employer] for additional drug information, tools, and databases.  Contributor Disclosures  For additional information see "Tirzepatide: Drug information"    You must carefully read the "Consumer Information Use and Disclaimer" below in order to understand and correctly use this information.  Brand Names: [place]  Mounjaro;     Zepbound    Brand Names: Isael  Mounjaro    Warning  This drug has been shown to cause thyroid cancer in some animals. It is not known if this happens in humans. If thyroid cancer happens, it may be deadly if not found and treated early. Call your doctor right away if you have a neck mass, trouble breathing, trouble swallowing, or have hoarseness that will not go away.  Do not use this drug if you have a health problem called Multiple Endocrine Neoplasia syndrome type 2 (MEN 2), or if you or a family member have had thyroid cancer.  Have your blood work checked and thyroid ultrasounds as you have been told by your doctor.    What is this drug used for?  It is used to lower blood sugar in people with type 2 diabetes.  It is used to help with weight loss in certain people.    What do I need to tell my doctor BEFORE I take this drug?  All products:  If you are allergic to this drug; any part of this drug; or any other drugs, foods, or substances. Tell your doctor about the allergy and what signs you had.  If you have ever had pancreatitis.  If you have stomach or bowel problems.  If you are using another drug that has the same drug in it.  If you are using another drug like this one. If you are not sure, ask your doctor or pharmacist.  If you are using this drug for diabetes:  If you have type 1 diabetes. Do not use this drug to treat type 1 diabetes.  Zepbound:  If you have or have ever had depression or " thoughts of suicide.  This is not a list of all drugs or health problems that interact with this drug.  Tell your doctor and pharmacist about all of your drugs (prescription or OTC, natural products, vitamins) and health problems. You must check to make sure that it is safe for you to take this drug with all of your drugs and health problems. Do not start, stop, or change the dose of any drug without checking with your doctor.    What are some things I need to know or do while I take this drug?  All products:  Tell all of your health care providers that you take this drug. This includes your doctors, nurses, pharmacists, and dentists.  Follow the diet and workout plan that your doctor told you about.  Talk with your doctor before you drink alcohol.  Birth control pills may not work as well to prevent pregnancy. If you take birth control pills, you may need to switch to another type of hormone-based birth control like a vaginal ring if your doctor tells you to. If another type of hormone-based birth control is not an option, use some other kind of birth control also, like a condom. Do this for 4 weeks after starting this drug and for 4 weeks each time the dose is raised.  This drug may prevent other drugs taken by mouth from getting into the body. If you take other drugs by mouth, you may need to take them at some other time than this drug. Talk with your doctor.  Do not share with another person even if the needle has been changed. Sharing your tray or pen may pass infections from one person to another. This includes infections you may not know you have.  If you cannot drink liquids by mouth or if you have upset stomach, throwing up, or diarrhea that does not go away, you need to avoid getting dehydrated. Contact your doctor to find out what to do. Dehydration may lead to low blood pressure or to new or worsening kidney problems.  A severe and sometimes deadly pancreas problem (pancreatitis) has happened with other  drugs like this one.  If you are using this drug for diabetes:  It may be harder to control blood sugar during times of stress such as fever, infection, injury, or surgery. A change in physical activity, exercise, or diet may also affect blood sugar.  Check your blood sugar as you have been told by your doctor.  Do not drive if your blood sugar has been low. There is a greater chance of you having a crash.  Wear disease medical alert ID (identification).  Tell your doctor if you are pregnant, plan on getting pregnant, or are breast-feeding. You will need to talk about the benefits and risks to you and the baby.  Zepbound:  If you have high blood sugar (diabetes), you will need to watch your blood sugar closely.  Weight loss during pregnancy may cause harm to the unborn baby. If you get pregnant while taking this drug or if you want to get pregnant, call your doctor right away.  Tell your doctor if you are breast-feeding. You will need to talk about any risks to your baby.    What are some side effects that I need to call my doctor about right away?  WARNING/CAUTION: Even though it may be rare, some people may have very bad and sometimes deadly side effects when taking a drug. Tell your doctor or get medical help right away if you have any of the following signs or symptoms that may be related to a very bad side effect:  All products:  Signs of an allergic reaction, like rash; hives; itching; red, swollen, blistered, or peeling skin with or without fever; wheezing; tightness in the chest or throat; trouble breathing, swallowing, or talking; unusual hoarseness; or swelling of the mouth, face, lips, tongue, or throat.  Signs of kidney problems like unable to pass urine, change in how much urine is passed, blood in the urine, or a big weight gain.  Signs of gallbladder problems like pain in the upper right belly area, right shoulder area, or between the shoulder blades; yellow skin or eyes; fever with chills; bloating;  or very upset stomach or throwing up.  Signs of a pancreas problem (pancreatitis) like very bad stomach pain, very bad back pain, or very bad upset stomach or throwing up.  Dizziness or passing out.  A fast heartbeat.  Change in eyesight.  Low blood sugar can happen. The chance may be raised when this drug is used with other drugs for diabetes. Signs may be dizziness, headache, feeling sleepy or weak, shaking, fast heartbeat, confusion, hunger, or sweating. Call your doctor right away if you have any of these signs. Follow what you have been told to do for low blood sugar. This may include taking glucose tablets, liquid glucose, or some fruit juices.  Zepbound:  New or worse behavior or mood changes like depression or thoughts of suicide.    What are some other side effects of this drug?  All drugs may cause side effects. However, many people have no side effects or only have minor side effects. Call your doctor or get medical help if any of these side effects or any other side effects bother you or do not go away:  All products:  Constipation, diarrhea, stomach pain, upset stomach, throwing up, or decreased appetite.  Heartburn.  Pain, itching, or other irritation where the injection was given.  Zepbound:  Feeling tired or weak.  Hair loss.  These are not all of the side effects that may occur. If you have questions about side effects, call your doctor. Call your doctor for medical advice about side effects.  You may report side effects to your national health agency.    How is this drug best taken?  Use this drug as ordered by your doctor. Read all information given to you. Follow all instructions closely.  All products:  It is given as a shot into the fatty part of the skin on the top of the thigh, belly area, or upper arm.  If you will be giving yourself the shot, your doctor or nurse will teach you how to give the shot.  Keep taking this drug as you have been told by your doctor or other health care provider,  even if you feel well.  Take the same day each week.  Move site where you give the shot each time.  Take with or without food.  Wash your hands before and after use.  Do not use if the solution is leaking or has particles.  This drug is colorless to a faint yellow. Do not use if the solution changes color.  Do not move this drug from the pen to a syringe.  Each pen or vial is for 1 use only. Throw away any part of the used pen after the dose is given.  Throw away needles in a needle/sharp disposal box. Do not reuse needles or other items. When the box is full, follow all local rules for getting rid of it. Talk with a doctor or pharmacist if you have any questions.  Vials:  It is important to have the right syringe to measure your dose. If you do not have the right syringe or you are not sure, talk with your pharmacist.  If you are using this drug for diabetes:  If you are also using insulin, you may inject this drug and the insulin in the same area of the body but not right next to each other.  Do not mix this drug in the same syringe with insulin.    What do I do if I miss a dose?  If it is within 4 days after the missed dose, take the missed dose and go back to your normal day.  If it has been more than 4 days since the missed dose, skip the missed dose and go back to your normal day.  Do not take 2 doses at the same time or extra doses.    How do I store and/or throw out this drug?  Store in a refrigerator. Do not freeze.  Do not use if it has been frozen.  If needed, each pen or vial may be stored at room temperature for up to 21 days. If you store at room temperature, throw away any part not used after 21 days.  Protect from heat.  Store in the original container to protect from light.  Keep all drugs in a safe place. Keep all drugs out of the reach of children and pets.  Throw away unused or  drugs. Do not flush down a toilet or pour down a drain unless you are told to do so. Check with your pharmacist  if you have questions about the best way to throw out drugs. There may be drug take-back programs in your area.    General drug facts  If your symptoms or health problems do not get better or if they become worse, call your doctor.  Do not share your drugs with others and do not take anyone else's drugs.  Some drugs may have another patient information leaflet. If you have any questions about this drug, please talk with your doctor, nurse, pharmacist, or other health care provider.  If you think there has been an overdose, call your poison control center or get medical care right away. Be ready to tell or show what was taken, how much, and when it happened.    Last Reviewed Date  2024-04-17  Consumer Information Use and Disclaimer  This generalized information is a limited summary of diagnosis, treatment, and/or medication information. It is not meant to be comprehensive and should be used as a tool to help the user understand and/or assess potential diagnostic and treatment options. It does NOT include all information about conditions, treatments, medications, side effects, or risks that may apply to a specific patient. It is not intended to be medical advice or a substitute for the medical advice, diagnosis, or treatment of a health care provider based on the health care provider's examination and assessment of a patient's specific and unique circumstances. Patients must speak with a health care provider for complete information about their health, medical questions, and treatment options, including any risks or benefits regarding use of medications. This information does not endorse any treatments or medications as safe, effective, or approved for treating a specific patient. Up[employer], Inc[employer] and its affiliates disclaim any warranty or liability relating to this information or the use thereof. The use of this information is governed by the Terms of Use, available at  https://www.wolterskluwer.com/en/know/clinical-effectiveness-terms.    © 2024 [employer] and its affiliates and/or licensors. All rights reserved.  Use of [employer] is subject to the Terms of Use.  Topic 211023 Version 21.0

## 2024-06-28 ENCOUNTER — OFFICE VISIT (OUTPATIENT)
Dept: DERMATOLOGY | Facility: CLINIC | Age: 59
End: 2024-06-28
Payer: COMMERCIAL

## 2024-06-28 DIAGNOSIS — L82.0 INFLAMED SEBORRHEIC KERATOSIS: ICD-10-CM

## 2024-06-28 DIAGNOSIS — L57.0 ACTINIC KERATOSIS: Primary | ICD-10-CM

## 2024-06-28 PROCEDURE — 99999 PR PBB SHADOW E&M-EST. PATIENT-LVL III: CPT | Mod: PBBFAC,,, | Performed by: STUDENT IN AN ORGANIZED HEALTH CARE EDUCATION/TRAINING PROGRAM

## 2024-06-28 NOTE — PROGRESS NOTES
Patient Information  Name: Linette Schmidt  : 1965  MRN: [id number]     Referring Physician:  Dr. Lam ref. provider found   Primary Care Physician:  COY Schulte MD   Date of Visit: 2024      Subjective:       Linette Schmidt is a 59 y.o. female who presents for   Chief Complaint   Patient presents with    Spot     C/o spot on left ear, scaly bump      HPI  Patient is here with concern of: skin lesion  Location: left ear, nose  Duration: 1 month  Symptoms: scaly  Prior treatments: none    Patient was last seen:2024     Prior notes by myself reviewed.   Clinical documentation obtained by nursing staff reviewed.    Review of Systems   Skin:  Negative for itching and rash.        Objective:    Physical Exam   Constitutional: She appears well-developed and well-nourished. No distress.   Neurological: She is alert and oriented to person, place, and time. She is not disoriented.   Psychiatric: She has a normal mood and affect.   Skin:   Areas Examined (abnormalities noted in diagram):   Head / Face Inspection Performed  Neck Inspection Performed                  Diagram Legend     Erythematous scaling macule/papule c/w actinic keratosis       Vascular papule c/w angioma      Pigmented verrucoid papule/plaque c/w seborrheic keratosis      Yellow umbilicated papule c/w sebaceous hyperplasia      Irregularly shaped tan macule c/w lentigo     1-2 mm smooth white papules consistent with Milia      Movable subcutaneous cyst with punctum c/w epidermal inclusion cyst      Subcutaneous movable cyst c/w pilar cyst      Firm pink to brown papule c/w dermatofibroma      Pedunculated fleshy papule(s) c/w skin tag(s)      Evenly pigmented macule c/w junctional nevus     Mildly variegated pigmented, slightly irregular-bordered macule c/w mildly atypical nevus      Flesh colored to evenly pigmented papule c/w intradermal nevus       Pink pearly papule/plaque c/w basal cell carcinoma      Erythematous  hyperkeratotic cursted plaque c/w SCC      Surgical scar with no sign of skin cancer recurrence      Open and closed comedones      Inflammatory papules and pustules      Verrucoid papule consistent consistent with wart     Erythematous eczematous patches and plaques     Dystrophic onycholytic nail with subungual debris c/w onychomycosis     Umbilicated papule    Erythematous-base heme-crusted tan verrucoid plaque consistent with inflamed seborrheic keratosis     Erythematous Silvery Scaling Plaque c/w Psoriasis     See annotation      No images are attached to the encounter or orders placed in the encounter.    [] Data reviewed  [] Independent review of test  [] Management discussed with another provider    Assessment / Plan:        Actinic keratosis  Cryosurgery Procedure Note    Verbal consent from the patient is obtained including, but not limited to, risk of hypopigmentation/hyperpigmentation, scar, recurrence of lesion. The patient is aware of the precancerous quality and need for treatment of these lesions. Liquid nitrogen cryosurgery is applied to the 1 actinic keratoses, as detailed in the physical exam, to produce a freeze injury. The patient is aware that blisters may form and is instructed on wound care with gentle cleansing and use of vaseline ointment to keep moist until healed. The patient is supplied a handout on cryosurgery and is instructed to call if lesions do not completely resolve.    Inflamed seborrheic keratosis  Cryosurgery procedure note:    Verbal consent from the patient is obtained including, but not limited to, risk of hypopigmentation/hyperpigmentation, scar, recurrence of lesion. Liquid nitrogen cryosurgery is applied to 1 lesions to produce a freeze injury. The patient is aware that blisters may form and is instructed on wound care with gentle cleansing and use of vaseline ointment to keep moist until healed. The patient is supplied a handout on cryosurgery and is instructed to call  if lesions do not completely resolve.             LOS NUMBER AND COMPLEXITY OF PROBLEMS    COMPLEXITY OF DATA RISK TOTAL TIME (m)   94283  77159 [] 1 self-limited or minor problem [] Minimal to none [] No treatment recommended or patient to monitor 15-29  10-19   48475  50698 Low  [] 2 or > self limited or minor problems  [] 1 stable chronic illness  [] 1 acute, uncomplicated illness or injury Limited (2)  [] Prior external notes from each unique source  [] Review result of each unique test  [] Order each unique test []  Low  OTC medications, minor skin biopsy 30-44  20-29   19105  55980 Moderate  []  1 or > chronic illness with progression, exacerbation or SE of treatment  []  2 or more stable chronic illnesses  []  1 acute illness with systemic symptoms  []  1 acute complicated injury  []  1 undiagnosed new problem with uncertain prognosis Moderate (1/3 below)  []  3 or more data items        *Now includes assessment requiring independent historian  []  Independent interpretation of a test  []  Discuss management/test with another provider Moderate  []  Prescription drug mgmt  []  Minor surgery with risk discussed  []  Mgmt limited by social determinates 45-59  30-39   51475  83912 High  []  1 or more chronic illness with severe exacerbation, progression or SE of treatment  []  1 acute or chronic illness/injury that poses a threat to life or bodily function Extensive (2/3 below)  []  3 or more data items        *Now includes assessment requiring independent historian.  []  Independent interpretation of a test  []  Discuss management/test with another provider High  []  Major surgery with risk discussed  []  Drug therapy requiring intensive monitoring for toxicity  []  Hospitalization  []  Decision for DNR 60-74  40-54      No follow-ups on file.    Gardenia Betancourt MD, FAAD  [facility]

## 2024-06-28 NOTE — PATIENT INSTRUCTIONS

## 2024-07-09 ENCOUNTER — HOSPITAL ENCOUNTER (OUTPATIENT)
Dept: RADIOLOGY | Facility: HOSPITAL | Age: 59
Discharge: HOME | End: 2024-07-09
Attending: FAMILY MEDICINE
Payer: COMMERCIAL

## 2024-07-09 VITALS — WEIGHT: 167.56 LBS | BODY MASS INDEX: 26.93 KG/M2 | HEIGHT: 66 IN

## 2024-07-09 DIAGNOSIS — Z12.31 ENCOUNTER FOR SCREENING MAMMOGRAM FOR BREAST CANCER: ICD-10-CM

## 2024-07-09 PROCEDURE — 77063 BREAST TOMOSYNTHESIS BI: CPT | Mod: 26,,, | Performed by: RADIOLOGY

## 2024-07-09 PROCEDURE — 77067 SCR MAMMO BI INCL CAD: CPT | Mod: TC

## 2024-07-09 PROCEDURE — 77063 BREAST TOMOSYNTHESIS BI: CPT | Mod: TC

## 2024-07-09 PROCEDURE — 77067 SCR MAMMO BI INCL CAD: CPT | Mod: 26,,, | Performed by: RADIOLOGY

## 2024-07-10 ENCOUNTER — CLINICAL SUPPORT (OUTPATIENT)
Dept: REHABILITATION | Facility: HOSPITAL | Age: 59
End: 2024-07-10
Attending: INTERNAL MEDICINE
Payer: COMMERCIAL

## 2024-07-10 ENCOUNTER — OFFICE VISIT (OUTPATIENT)
Dept: RHEUMATOLOGY | Facility: CLINIC | Age: 59
End: 2024-07-10
Payer: COMMERCIAL

## 2024-07-10 VITALS
SYSTOLIC BLOOD PRESSURE: 94 MMHG | BODY MASS INDEX: 26.72 KG/M2 | HEIGHT: 66 IN | WEIGHT: 166.25 LBS | HEART RATE: 83 BPM | DIASTOLIC BLOOD PRESSURE: 65 MMHG

## 2024-07-10 DIAGNOSIS — M25.612 DECREASED ROM OF LEFT SHOULDER: Primary | ICD-10-CM

## 2024-07-10 DIAGNOSIS — M75.100 ROTATOR CUFF SYNDROME, UNSPECIFIED LATERALITY: ICD-10-CM

## 2024-07-10 DIAGNOSIS — R29.898 WEAKNESS OF LEFT SHOULDER: ICD-10-CM

## 2024-07-10 DIAGNOSIS — Z87.442 HISTORY OF NEPHROLITHIASIS: ICD-10-CM

## 2024-07-10 DIAGNOSIS — E55.9 VITAMIN D INSUFFICIENCY: ICD-10-CM

## 2024-07-10 DIAGNOSIS — M67.929 BICEPS TENDINOPATHY, UNSPECIFIED LATERALITY: ICD-10-CM

## 2024-07-10 DIAGNOSIS — Z79.83 LONG TERM (CURRENT) USE OF BISPHOSPHONATES: ICD-10-CM

## 2024-07-10 DIAGNOSIS — M81.0 AGE-RELATED OSTEOPOROSIS WITHOUT CURRENT PATHOLOGICAL FRACTURE: Primary | ICD-10-CM

## 2024-07-10 DIAGNOSIS — Z51.81 MEDICATION MONITORING ENCOUNTER: ICD-10-CM

## 2024-07-10 PROCEDURE — 99214 OFFICE O/P EST MOD 30 MIN: CPT | Mod: S$GLB,,, | Performed by: INTERNAL MEDICINE

## 2024-07-10 PROCEDURE — 99999 PR PBB SHADOW E&M-EST. PATIENT-LVL IV: CPT | Mod: PBBFAC,,, | Performed by: INTERNAL MEDICINE

## 2024-07-10 PROCEDURE — 97112 NEUROMUSCULAR REEDUCATION: CPT | Mod: PN

## 2024-07-10 PROCEDURE — 97161 PT EVAL LOW COMPLEX 20 MIN: CPT | Mod: PN

## 2024-07-10 RX ORDER — ABALOPARATIDE 2000 UG/ML
80 INJECTION, SOLUTION SUBCUTANEOUS DAILY
Qty: 1.56 ML | Refills: 11 | Status: ACTIVE | OUTPATIENT
Start: 2024-07-10 | End: 2025-07-10

## 2024-07-10 NOTE — PROGRESS NOTES
RHEUMATOLOGY OUTPATIENT CLINIC NOTE    7/10/2024    Attending Rheumatologist: Breezy Crump  Primary Care Provider/Physician Requesting Consultation: COY Epps MD   Chief Complaint/Reason For Consultation:  Psoriatic Arthritis and Osteoporosis      Subjective:     Linette Schmidt is a 59 y.o. White female with OP    No acute complains at present.  Left shoulder pain w/ mechani valarie pattern  No associated with recent injury.  Episode of leaning on chest resulting on rib fracture 2/2024.  Not currently scheduled for invasive dental w/u.    Review of Systems   Cardiovascular:         No hx of MI   Genitourinary:  Negative for dysuria, hematuria and urgency.        Hx on nephrolithiasis, last 30y ago.   Musculoskeletal:  Positive for joint pain. Negative for back pain and falls.        Denies dactylitis.  No hx of gout.    History of fragility fractures (rib fracture upon coughing)   Neurological:  Negative for focal weakness and weakness.        No hx of CVA   Endo/Heme/Allergies:         No hx of bone radiation     Chronic comorbid conditions affecting medical decision making today:  Past Medical History:   Diagnosis Date    Asthma     Osteoporosis      Past Surgical History:   Procedure Laterality Date    FINGER FRACTURE SURGERY Left     5th finger    HYSTERECTOMY      PARTIAL HYSTERECTOMY       Family History   Problem Relation Name Age of Onset    Hypertension Mother      Hypertension Father      Hypertension Sister      Breast cancer Neg Hx      Colon cancer Neg Hx      Ovarian cancer Neg Hx       Social History     Tobacco Use   Smoking Status Never   Smokeless Tobacco Never       Current Outpatient Medications:     albuterol (PROVENTIL/VENTOLIN HFA) 90 mcg/actuation inhaler, Inhale 2 puffs into the lungs every 6 (six) hours as needed for Wheezing. Rescue, Disp: 20.1 g, Rfl: 3    alendronate (FOSAMAX) 70 MG tablet, Take 1 tablet (70 mg total) by mouth every 7 days., Disp: 12 tablet, Rfl: 3     aspirin (ECOTRIN) 81 MG EC tablet, Take 81 mg by mouth once daily., Disp: , Rfl:     Ca-D3-mag ox-zinc--rodney-bor 600 mg calcium- 20 mcg-50 mg Tab, , Disp: , Rfl:     hydroCHLOROthiazide (HYDRODIURIL) 12.5 MG Tab, Take 1 tablet (12.5 mg total) by mouth once daily., Disp: 90 tablet, Rfl: 0    inhalation spacing device, Use as directed when taking inhaled medicine, Disp: 1 each, Rfl: 0    mv-mn/iron/folic acid/herb 190 (VITAMIN D3 COMPLETE ORAL), Take by mouth., Disp: , Rfl:     pravastatin (PRAVACHOL) 80 MG tablet, , Disp: , Rfl:     ramipriL (ALTACE) 5 MG capsule, Take 1 capsule (5 mg total) by mouth once daily., Disp: 90 capsule, Rfl: 1    tirzepatide, weight loss, (ZEPBOUND) 2.5 mg/0.5 mL PnIj, Inject 2.5 mg into the skin every 7 days., Disp: 4 Pen, Rfl: 0    tirzepatide, weight loss, (ZEPBOUND) 5 mg/0.5 mL PnIj, Inject 5 mg into the skin every 7 days., Disp: 4 Pen, Rfl: 1    hydrocortisone 2.5 % ointment, Apply topically 2 (two) times daily., Disp: 80 g, Rfl: 2    metronidazole 0.75% (METROCREAM) 0.75 % Crea, Apply topically once daily. (Patient not taking: Reported on 1/29/2024), Disp: 45 g, Rfl: 1    tretinoin microspheres (RETIN-A MICRO PUMP) 0.06 % GlwP, Apply 1 application  topically every evening. (Patient not taking: Reported on 1/29/2024), Disp: 50 g, Rfl: 5     Objective:     Vitals:    07/10/24 0743   BP: 94/65   Pulse: 83     Physical Exam   Eyes: Conjunctivae are normal.   Pulmonary/Chest: Effort normal. No respiratory distress.   Musculoskeletal:         General: No swelling or tenderness. Normal range of motion.      Comments: Rotator cuff maneuvers elicit discomfort L>R   Neurological: She displays no weakness.   Skin: No rash noted.       Reviewed available old and all outside pertinent medical records available.    All lab results personally reviewed and interpreted by me.       ASSESSMENT / PLAN     1. Age-related osteoporosis without current pathological fracture  Long term rx w/ oral  bisphosphonate.  Refractory osteoporotic T scores on repeat DXA.  Recommend 2 year course of bone anabolic therapy.  abaloparatide (TYMLOS) 80 mcg (3,120 mcg/1.56 mL) PnIj      2. Biceps tendinopathy, unspecified laterality  Ambulatory referral/consult to Physical/Occupational Therapy      3. Rotator cuff syndrome, unspecified laterality  Ambulatory referral/consult to Physical/Occupational Therapy      4. Long term (current) use of bisphosphonates  At risk of atypical fractures with bisphosphonates for longer than 5 years      5. Medication monitoring encounter  Comprehensive Metabolic Panel      6. Vitamin D insufficiency  Vitamin D level      7. History of nephrolithiasis  Hold CA/vit D supp while on bone anabolic, unless deficiency identified on labs.                Breezy Crump M.D.

## 2024-07-13 PROBLEM — M25.612 DECREASED ROM OF LEFT SHOULDER: Status: ACTIVE | Noted: 2024-07-13

## 2024-07-13 PROBLEM — R29.898 WEAKNESS OF LEFT SHOULDER: Status: ACTIVE | Noted: 2024-07-13

## 2024-07-14 NOTE — PLAN OF CARE
[facility]   Physical Therapy Initial Evaluation        Date: 7/10/2024     Name: Linette Schmidt  Clinic Number: [id number]  Therapy Diagnosis:   Encounter Diagnoses   Name Primary?    Biceps tendinopathy, unspecified laterality     Rotator cuff syndrome, unspecified laterality       Physician: Breezy Crump MD      Physician Orders: PT Eval and Treat  Medical Diagnosis from Referral: Biceps tendinopathy/RTC syndrome  Evaluation Date: 7/10/2024  Authorization Period Expiration: 7/11/2024  Plan of Care Expiration: 9/8/2024    Progress Update: 8/9/2024   Visit # / Visits authorized: 1 / 1   FOTO: 7/10/2024 - Scored: 1 / 3       PRECAUTIONS: Standard Precautions     Time In: 1300  Time Out: 1340  Total Appointment Time (timed & untimed codes): 40 minutes    SUBJECTIVE     Date of onset: 2 months (R) 72 months (L)  Chief Complaint: Shoulder pain pain    History of current condition - Linette is a 59 y.o. female who presents to physical therapy reporting she tried yoga and pilates back in Oct 2023 with trouble rasing left arm out and back.  Left side is worse than right.      Falls: [x] No  [] Yes:       Imaging: [] Xray [] MRI [] CT: None    Prior Therapy:  [x] No  [] Yes:   Social History: Pt lives with their spouse [x] House [] Apartment/Condo []other  Stairs: [x] No  [] Yes:   Occupation: Patient is Data Unavailable  Retired    Prior Level of Function: Independent with all activities of daily living  Current Level of Function: limited motion with raising and reaching with arms    Pain:  Current 0/10, worst 6/10, best 0 /10   Location: [] Right [] Left [x] Bilateral: shoulders  Description: sharp pain  Aggravating Factors: reaching and lifting  Easing Factors: activity avoidance, rest, medication    Pts goals: Pt reported goals are to improve pain and function    _______________________________________________________  Medical History:   Past Medical History:   Diagnosis Date     Asthma     Osteoporosis        Surgical History:   Linette Schmidt  has a past surgical history that includes Partial hysterectomy; Finger fracture surgery (Left); and Hysterectomy.    Medications:   Linette has a current medication list which includes the following prescription(s): tymlos, albuterol, aspirin, ca-d3-mag ox-zinc--rodney-bor, hydrocortisone, inhalation spacing device, metronidazole 0.75%, mv-mn/iron/folic acid/herb 190, pravastatin, zepbound, zepbound, and retin-a micro pump.    Allergies:   Review of patient's allergies indicates:  No Known Allergies       OBJECTIVE     Posture:  Pt presents with postural abnormalities which include:    [x] Forward Head   [] Increased Lumbar Lordosis   [x] Rounded Shoulder   [] Flat Back Posture   [] Increased Thoracic Kyphosis [] Inominate Rotation   [] Increased Trunk Sway  [] Genu Recurvatum   [] Increased Trunk Rotation  [] Pes Planus   [] Other:     Sensation:  Sensation is [x] Intact [] Impaired-- to light touch        SHOULDER-   SHOULDER   Range of Motion Right  Active     Passive Left  Active     Passive Goal   Forward Flexion (180º) 160  155  170º   Abduction (180º) 160  155  160º   Functional External Rotation (C7) WFL  WFL  C7   Functional Internal Rotation (T10)  WFL (P!)  WFL  T10   (*) pain and/or dysfunction) pain and/or dysfunction   Pain starts in abduction at 60 degrees and stops approximately 120 (L) and (R)   UE STRENGTH -   Upper Extremity  Strength Right   Goal   Shoulder Flexion []1  []2  []3  [x]4  []5                []+ []- 5/5 B   Shoulder Abduction []1  []2  []3  [x]4  []5                []+ []- 5/5 B   Shoulder IR []1  []2  []3  [x]4  []5                []+ []- 5/5 B   Shoulder ER []1  []2  []3  [x]4  []5                []+ []- 5/5 B   Elbow Flexion  []1  []2  []3  [x]4  []5                []+ []- 5/5 B   Elbow Extension []1  []2  []3  [x]4  []5                []+ []- 5/5 B     Upper Extremity  Strength Left   Goal   Shoulder  Flexion []1  []2  []3  [x]4  []5                []+ [x]- 5/5 B   Shoulder Abduction []1  []2  []3  [x]4  []5                []+ [x]- 5/5 B   Shoulder IR []1  []2  []3  [x]4  []5                []+ [x]- 5/5 B   Shoulder ER []1  []2  []3  [x]4  []5                []+ [x]- 5/5 B   Elbow Flexion  []1  []2  []3  [x]4  []5                []+ []- 5/5 B   Elbow Extension []1  []2  []3  [x]4  []5                []+ []- 5/5 B        FUNCTION:     Intake Outcome Measure for FOTO Shoulder Survey    Therapist reviewed FOTO scores for Lniette Schmidt on 7/10/2024.   FOTO documents entered into [employer] - see Media section.    Intake Score: 56%         TREATMENT     Total Treatment time separate from Evaluation: (20) minutes    Linette received the following interventions:     Plan for Next Visit:      [x] CPT Interventions Duration / Resistance     Standing external rotation      Standing internal rotation      Standing rows      Bilateral upper extremity external rotation      Wall shoulder slides                                  CPT Codes available for Billing:   (00) minutes of Manual therapy (MT) to improve pain and ROM.  (00) minutes of Therapeutic Exercise (TE) to develop strength, endurance, range of motion, and flexibility.  (20) minutes of Neuromuscular Re-Education (NMR)  to improve: Balance, Coordination, Kinesthetic, Sense, Proprioception, and Posture.  (00) minutes of Therapeutic Activities (TA) to improve functional performance.  Unattended Electrical Stimulation (ES) for muscle performance or pain modulation.  Vasopneumatic Device Therapy () for management of swelling/edema. (09579)    See EMR under MEDIA for written consent provided for Dry Needling- DATE   Palpation Assessment to determine the necessity for Functional Dry Needling    PATIENT EDUCATION AND HOME EXERCISES     Education/Self-Care provided:  (included in treatment) minutes   Patient educated on the impairments noted above and the effects of  physical therapy intervention to improve overall condition and Quality of Life  Patient was educated on all the above exercise prior/during/after for proper posture, positioning, and execution for safe performance with home exercise program.     Written Home Exercises Provided: yes. Prefers: [x] Printed [] Electronic  Exercises were reviewed and Linette was able to demonstrate them prior to the end of the session.  Linette demonstrated good understanding of the education provided. See EMR under Patient Instructions for exercises provided during therapy sessions.      ASSESSMENT     Linette is a 59 y.o. female referred to outpatient Physical Therapy with a medical diagnosis of   Encounter Diagnoses   Name Primary?    Biceps tendinopathy, unspecified laterality     Rotator cuff syndrome, unspecified laterality    .    Physical exam supports shoulder impairments including: decreased range of motion, decreased muscular strength, decreased endurance, decreased muscular length/flexibility, impaired joint mobility, and impaired functional mobility.     The above impairments will be addressed through manual therapy techniques, therapeutic exercises, functional training, and modalities as necessary. Patient was treated and educated on exercises for home program, progression of therapy, and benefits of therapy to achieve full functional mobility.       Pt prognosis is Good.   Pt will benefit from skilled outpatient Physical Therapy to address the deficits stated above and in the chart below, provide pt/family education, and to maximize pt's level of independence.     Plan of care discussed with patient: Yes  Pt's spiritual, cultural and educational needs considered and patient is agreeable to the plan of care and goals as stated below:     Anticipated Barriers for therapy: none    Medical Necessity is demonstrated by the following:     History  Co-morbidities and personal factors that may impact the plan of care [x] LOW:  no personal factors / co-morbidities  [] MODERATE: 1-2 personal factors / co-morbidities  [] HIGH: 3+ personal factors / co-morbidities    Moderate / High Support Documentation:   Co-morbidities affecting plan of care:   Past Medical History:   Diagnosis Date    Asthma     Osteoporosis        Personal Factors:   no deficits     Examination  Body Structures and Functions, activity limitations and participation restrictions that may impact the plan of care [x] LOW: addressing 1-2 elements  [] MODERATE: 3+ elements  [] HIGH: 4+ elements (please support below)    Moderate / High Support Documentation:   [] Head / Neck  [] Spine  [x] Upper Quarter  [] Lower Quarter  [] Range of motion Deficits  [] Gross Symmetry Deficits  [] Strength Deficits  [] Balance Deficits  [] Gait Deficits  [] Unable to participate in daily activities  [] Unable to perform functional tasks  [] Unable to Care for Self or others  [] Community/ Social Life changes due to impairments     Clinical Presentation [x] LOW: stable  [] MODERATE: Evolving  [] HIGH: Unstable     Decision Making/ Complexity Score: low         SHORT TERM GOALS:  4 weeks  Progress Date Met   Recent signs and systems trend is improving in order to progress towards Long term goals.  [] Met  [] Not Met  [] Progressing    Patient will be independent with Home Exercise Program  in order to further progress and return to maximal function. [] Met  [] Not Met  [] Progressing    Pain rating at Worst: 5 /10 in order to progress towards increased independence with activity. [] Met  [] Not Met  [] Progressing    Patient will be able to correct postural deviations in sitting and standing, to decrease pain and promote postural awareness for injury prevention.  [] Met  [] Not Met  [] Progressing    Patient will improve functional outcome (FOTO) score: by 5% to increase self-worth & perceived functional ability towards long term goals [] Met  [] Not Met  [] Progressing      LONG TERM GOALS: 10  weeks  Progress Date Met   Patient will return to normal activites of daily living, recreational, and work related activities with less pain and limitation.  [] Met  [] Not Met  [] Progressing    Patient will improve range of motion  to stated goals in order to return to maximal functional potential.  [] Met  [] Not Met  [] Progressing    Patient will improve Strength to stated goals of appropriate musculature in order to improve functional independence.  [] Met  [] Not Met  [] Progressing    Pain Rating at Best: 1/10 to improve Quality of Life.  [] Met  [] Not Met  [] Progressing    Patient will meet predicted functional outcome (FOTO) score: 68% to increase self-worth & perceived functional ability. [] Met  [] Not Met  [] Progressing    Patient will have met/partially met personal goal of: improve pain and function  [] Met  [] Not Met  [] Progressing          PLAN   Plan of care Certification: 7/10/2024 to 9/8/2024    Outpatient Physical Therapy 2 times weekly for 10 weeks to include any combination of the following interventions: virtual visits, dry needling, modalities, electrical stimulation (IFC, Pre-Mod, Attended with Functional Dry Needling), Manual Therapy, Moist Heat/ Ice, Neuromuscular Re-ed, Patient Education, Self Care, Therapeutic Exercise, Functional Training, and Therapeutic Activites     Thank you for this referral.    Rodrigo Kelsey, PT

## 2024-07-15 ENCOUNTER — CLINICAL SUPPORT (OUTPATIENT)
Dept: REHABILITATION | Facility: HOSPITAL | Age: 59
End: 2024-07-15
Payer: COMMERCIAL

## 2024-07-15 DIAGNOSIS — M25.612 DECREASED ROM OF LEFT SHOULDER: Primary | ICD-10-CM

## 2024-07-15 DIAGNOSIS — R29.898 WEAKNESS OF LEFT SHOULDER: ICD-10-CM

## 2024-07-15 PROCEDURE — 97112 NEUROMUSCULAR REEDUCATION: CPT | Mod: PN

## 2024-07-15 PROCEDURE — 97110 THERAPEUTIC EXERCISES: CPT | Mod: PN

## 2024-07-15 NOTE — PROGRESS NOTES
[facility]   Physical Therapy Treatment Note      Name: Linette Schmidt  [facility] Number: [id number]    Therapy Diagnosis:   Encounter Diagnoses   Name Primary?    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  She was compliant with home exercise program.  Response to previous treatment: good  Functional change: T    Pain: 4/10  Location: Bilateral shoulder      Objective      Objective Measures updated at progress report unless specified.     Treatment     Linette received the treatments listed below:      CPT Intervention  Joint:   Focus Duration / Intensity      NMR UBE 3' FWD/ 3' BWD   TE Shoulder wall slides 3x10   NMR Rows (Matrix) 25# 3x10   NMR Shoulder IR  Red tubing 3x10    NMR Shoulder ER Red tubing 3x10    NMR Standing cable shoulder extension 30# 3x10    TE Shoulder IR stretch with towel behind back 3x20 seconds    TE Supine UE swims 3x10 with hold   NMR  Open books 2x10 (B)   NMR Serratus punch 3# 3x10   NMR Slide lying shoulder ER 3# 3x10   NMR Prone T 2x10     Prone Y 2x10                                                   PLAN         CPT Codes available for Billing:   (00) minutes of Manual therapy (MT) to improve pain and ROM.  (15) minutes of Therapeutic Exercise (TE) to develop strength, endurance, range of motion, and flexibility.  (38) minutes of Neuromuscular Re-Education (NMR)  to improve: Balance,  Coordination, Kinesthetic, Sense, Proprioception, and Posture.  (00) minutes of Therapeutic Activities (TA) to improve functional performance.  Unattended Electrical Stimulation (ES) for muscle performance or pain modulation.  Vasopneumatic Device Therapy () for management of swelling/edema. (86131)  BFR: Blood flow restriction applied during exercise  NP or (-): Not Performed     Patient Education and Home Exercises       Education provided:   - Home program instruction    Written Home Exercises Provided: Patient instructed to cont prior HEP. Exercises were reviewed and Linette was able to demonstrate them prior to the end of the session.  Linette demonstrated good  understanding of the education provided. See Electronic Medical Record under Patient Instructions for exercises provided during therapy sessions    Assessment     The patient has pain at end ranges of left shoulder flexion and abduction. She was instructed in stretching and range of motion, activities, as well as strengthening and shoulder stabilization exercises to improve shoulder function. Patient required max cues and will benefit from continue physical therapy.    Linette Is progressing well towards her goals.   Patient prognosis is Good.     Patient will continue to benefit from skilled outpatient physical therapy to address the deficits listed in the problem list box on initial evaluation, provide pt/family education and to maximize pt's level of independence in the home and community environment.     Patient's spiritual, cultural and educational needs considered and pt agreeable to plan of care and goals.     Anticipated barriers to physical therapy: None    SHORT TERM GOALS:  4 weeks  Progress Date Met   Recent signs and systems trend is improving in order to progress towards Long term goals.  [] Met  [] Not Met  [x] Progressing     Patient will be independent with Home Exercise Program  in order to further progress and return to maximal  function. [] Met  [] Not Met  [x] Progressing     Pain rating at Worst: 5 /10 in order to progress towards increased independence with activity. [] Met  [] Not Met  [x] Progressing     Patient will be able to correct postural deviations in sitting and standing, to decrease pain and promote postural awareness for injury prevention.  [] Met  [] Not Met  [x] Progressing     Patient will improve functional outcome (FOTO) score: by 5% to increase self-worth & perceived functional ability towards long term goals [] Met  [] Not Met  [x] Progressing        LONG TERM GOALS: 10 weeks  Progress Date Met   Patient will return to normal activites of daily living, recreational, and work related activities with less pain and limitation.  [] Met  [] Not Met  [x] Progressing     Patient will improve range of motion  to stated goals in order to return to maximal functional potential.  [] Met  [] Not Met  [x] Progressing     Patient will improve Strength to stated goals of appropriate musculature in order to improve functional independence.  [] Met  [] Not Met  [x] Progressing     Pain Rating at Best: 1/10 to improve Quality of Life.  [] Met  [] Not Met  [x] Progressing     Patient will meet predicted functional outcome (FOTO) score: 68% to increase self-worth & perceived functional ability. [] Met  [] Not Met  [x] Progressing     Patient will have met/partially met personal goal of: improve pain and function  [] Met  [] Not Met  [x] Progressing              PLAN   Plan of care Certification: 7/10/2024 to 9/8/2024     Outpatient Physical Therapy 2 times weekly for 10 weeks to include any combination of the following interventions: virtual visits, dry needling, modalities, electrical stimulation (IFC, Pre-Mod, Attended with Functional Dry Needling), Manual Therapy, Moist Heat/ Ice, Neuromuscular Re-ed, Patient Education, Self Care, Therapeutic Exercise, Functional Training, and Therapeutic Activites     Rodrigo Kelsey, PT

## 2024-07-17 ENCOUNTER — CLINICAL SUPPORT (OUTPATIENT)
Dept: REHABILITATION | Facility: HOSPITAL | Age: 59
End: 2024-07-17
Payer: COMMERCIAL

## 2024-07-17 DIAGNOSIS — R29.898 WEAKNESS OF LEFT SHOULDER: ICD-10-CM

## 2024-07-17 DIAGNOSIS — M25.612 DECREASED ROM OF LEFT SHOULDER: Primary | ICD-10-CM

## 2024-07-17 PROCEDURE — 97112 NEUROMUSCULAR REEDUCATION: CPT | Mod: PN

## 2024-07-17 PROCEDURE — 97110 THERAPEUTIC EXERCISES: CPT | Mod: PN

## 2024-07-17 PROCEDURE — 97140 MANUAL THERAPY 1/> REGIONS: CPT | Mod: PN

## 2024-07-17 NOTE — PROGRESS NOTES
[facility]   Physical Therapy Treatment Note      Name: Linette Schmidt  [facility] Number: [id number]    Therapy Diagnosis:   Encounter Diagnoses   Name Primary?    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  Evaluation Date: 7/10/2024  Authorization Period Expiration: 7/11/2024  Plan of Care Expiration: 9/8/2024                  Progress Update: 8/9/2024     Visit # / Visits authorized: 1 / 1 :  1/20        FOTO: 7/10/2024 - Scored: 1 / 3          PTA Visit #: 0/5     Time in: 0820  Time out: 0920  Total Time: 60 minutes    Subjective     Patient reports:.The patient reports soreness since last therapy session in her shoulders    Response to previous treatment: good  Functional change:     Pain: 4/10  Location: Bilateral shoulder      Objective      Objective Measures updated at progress report unless specified.     Treatment     Linette received the treatments listed below:      CPT Intervention  Joint:   Focus Duration / Intensity      NMR UBE 3' FWD/ 3' BWD   TE Shoulder wall slides 3x10   NMR Rows (Matrix) 25# 3x10   NMR Shoulder IR  Red tubing 3x10    NMR Shoulder ER Red tubing 3x10    NMR Standing cable shoulder extension 30# 3x10    TE Shoulder IR stretch with towel behind back 3x20 seconds    TE Supine UE swims 3x10 with hold   NMR  Open books 2x10 (B)   NMR Serratus punch 3# 3x10   NMR Slide lying shoulder ER 3# 3x10   NMR Prone T 2x10     Prone Y 2x10                                                   PLAN         CPT Codes available for Billing:   (00) minutes of Manual therapy (MT) to improve pain and ROM.  (15) minutes of Therapeutic Exercise (TE) to develop strength, endurance, range of motion, and flexibility.  (40) minutes of Neuromuscular  Re-Education (NMR)  to improve: Balance, Coordination, Kinesthetic, Sense, Proprioception, and Posture.  (00) minutes of Therapeutic Activities (TA) to improve functional performance.  Unattended Electrical Stimulation (ES) for muscle performance or pain modulation.  Vasopneumatic Device Therapy () for management of swelling/edema. (59545)  BFR: Blood flow restriction applied during exercise  NP or (-): Not Performed     Patient Education and Home Exercises       Education provided:   - Home program instruction    Written Home Exercises Provided: Patient instructed to cont prior HEP. Exercises were reviewed and Linette was able to demonstrate them prior to the end of the session.  Linette demonstrated good  understanding of the education provided. See Electronic Medical Record under Patient Instructions for exercises provided during therapy sessions    Assessment     The patient has soreness from last session and has end range discomfort with exercises. The patient has been progressing with exercises for rotator cuff and scapular stabilization as well as improving range motion. Patient requires minimal to moderate cues for performance of exercise program and will benefit from further physical therapy.    Linette Is progressing well towards her goals.   Patient prognosis is Good.     Patient will continue to benefit from skilled outpatient physical therapy to address the deficits listed in the problem list box on initial evaluation, provide pt/family education and to maximize pt's level of independence in the home and community environment.     Patient's spiritual, cultural and educational needs considered and pt agreeable to plan of care and goals.     Anticipated barriers to physical therapy: None    SHORT TERM GOALS:  4 weeks  Progress Date Met   Recent signs and systems trend is improving in order to progress towards Long term goals.  [] Met  [] Not Met  [x] Progressing     Patient will be independent with  Home Exercise Program  in order to further progress and return to maximal function. [] Met  [] Not Met  [x] Progressing     Pain rating at Worst: 5 /10 in order to progress towards increased independence with activity. [] Met  [] Not Met  [x] Progressing     Patient will be able to correct postural deviations in sitting and standing, to decrease pain and promote postural awareness for injury prevention.  [] Met  [] Not Met  [x] Progressing     Patient will improve functional outcome (FOTO) score: by 5% to increase self-worth & perceived functional ability towards long term goals [] Met  [] Not Met  [x] Progressing        LONG TERM GOALS: 10 weeks  Progress Date Met   Patient will return to normal activites of daily living, recreational, and work related activities with less pain and limitation.  [] Met  [] Not Met  [x] Progressing     Patient will improve range of motion  to stated goals in order to return to maximal functional potential.  [] Met  [] Not Met  [x] Progressing     Patient will improve Strength to stated goals of appropriate musculature in order to improve functional independence.  [] Met  [] Not Met  [x] Progressing     Pain Rating at Best: 1/10 to improve Quality of Life.  [] Met  [] Not Met  [x] Progressing     Patient will meet predicted functional outcome (FOTO) score: 68% to increase self-worth & perceived functional ability. [] Met  [] Not Met  [x] Progressing     Patient will have met/partially met personal goal of: improve pain and function  [] Met  [] Not Met  [x] Progressing              PLAN   Plan of care Certification: 7/10/2024 to 9/8/2024     Outpatient Physical Therapy 2 times weekly for 10 weeks to include any combination of the following interventions: virtual visits, dry needling, modalities, electrical stimulation (IFC, Pre-Mod, Attended with Functional Dry Needling), Manual Therapy, Moist Heat/ Ice, Neuromuscular Re-ed, Patient Education, Self Care, Therapeutic Exercise,  Functional Training, and Therapeutic Activites     Rodrigo Kelsey, PT

## 2024-07-19 ENCOUNTER — PATIENT MESSAGE (OUTPATIENT)
Dept: ADMINISTRATIVE | Facility: OTHER | Age: 59
End: 2024-07-19
Payer: COMMERCIAL

## 2024-07-21 RX ORDER — HYDROCHLOROTHIAZIDE 12.5 MG/1
12.5 TABLET ORAL
Qty: 90 TABLET | Refills: 0 | OUTPATIENT
Start: 2024-07-21

## 2024-07-21 NOTE — TELEPHONE ENCOUNTER
Care Due:                  Date            Visit Type   Department     Provider  --------------------------------------------------------------------------------                                MYCHART                              FOLLOWUP/OF  [facility] INTER[facility]  Last Visit: 11-      FICE VISIT   MEDICINE       Abrahan Epps  Next Visit: None Scheduled  None         None Found                                                            Last  Test          Frequency    Reason                     Performed    Due Date  --------------------------------------------------------------------------------    HBA1C.......  6 months...  tirzepatide,.............  07- 01-    [facility] Embedded Care Due Messages. Reference number: [id number].   7/21/2024 8:08:10 AM CDT

## 2024-07-21 NOTE — TELEPHONE ENCOUNTER
Provider Staff:  Action required for this patient    Requires labs      Please see care gap opportunities below in Care Due Message.    Thanks!  [facility]     Appointments      Date Provider   Last Visit   11/28/2023 COY Epps MD   Next Visit   Visit date not found COY Epps MD     Refill Decision Note   Linette Schmidt  is requesting a refill authorization.  Brief Assessment and Rationale for Refill:  Quick Discontinue     Medication Therapy Plan: The original prescription was discontinued on 7/12/2024 by Audra Tirado, PharmD for the following reason: Therapy completed.      Comments:     Note composed:12:08 PM 07/21/2024

## 2024-07-22 ENCOUNTER — CLINICAL SUPPORT (OUTPATIENT)
Dept: REHABILITATION | Facility: HOSPITAL | Age: 59
End: 2024-07-22
Payer: COMMERCIAL

## 2024-07-22 DIAGNOSIS — M25.612 DECREASED ROM OF LEFT SHOULDER: Primary | ICD-10-CM

## 2024-07-22 DIAGNOSIS — R29.898 WEAKNESS OF LEFT SHOULDER: ICD-10-CM

## 2024-07-22 PROCEDURE — 97112 NEUROMUSCULAR REEDUCATION: CPT | Mod: PN

## 2024-07-22 PROCEDURE — 97110 THERAPEUTIC EXERCISES: CPT | Mod: PN

## 2024-07-25 DIAGNOSIS — Z00.00 ROUTINE GENERAL MEDICAL EXAMINATION AT A HEALTH CARE FACILITY: Primary | ICD-10-CM

## 2024-07-28 NOTE — PROGRESS NOTES
[facility]   Physical Therapy Treatment Note      Name: Linette Schmidt  [facility] Number: [id number]    Therapy Diagnosis:   Encounter Diagnoses   Name Primary?    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     Treatment     Linette received the treatments listed below:      CPT Intervention  Joint:   Focus Duration / Intensity      NMR UBE 3' FWD/ 3' BWD   TE Shoulder wall slides 3x10   NMR Rows (Matrix) 25# 3x10   NMR Shoulder IR  Red tubing 3x10    NMR Shoulder ER Red tubing 3x10    NMR Standing cable shoulder extension 30# 3x10    TE Shoulder IR stretch with towel behind back 3x20 seconds    TE Supine UE swims 3x10 with hold   NMR  Open books 2x10 (B)   NMR Serratus punch 3# 3x10   NMR Slide lying shoulder ER 3# 3x10   NMR Prone T 2x10     Prone Y 2x10                                                   PLAN         CPT Codes available for Billing:   (00) minutes of Manual therapy (MT) to improve pain and ROM.  (15) minutes of Therapeutic Exercise (TE) to develop strength, endurance, range of motion, and flexibility.  (40) minutes of Neuromuscular Re-Education (NMR)  to improve: Balance, Coordination,  Kinesthetic, Sense, Proprioception, and Posture.  (00) minutes of Therapeutic Activities (TA) to improve functional performance.  Unattended Electrical Stimulation (ES) for muscle performance or pain modulation.  Vasopneumatic Device Therapy () for management of swelling/edema. (22428)  BFR: Blood flow restriction applied during exercise  NP or (-): Not Performed     Patient Education and Home Exercises       Education provided:   - Home program instruction    Written Home Exercises Provided: Patient instructed to cont prior HEP. Exercises were reviewed and Linette was able to demonstrate them prior to the end of the session.  Linette demonstrated good  understanding of the education provided. See Electronic Medical Record under Patient Instructions for exercises provided during therapy sessions    Assessment     The patient has soreness from last session and has end range discomfort with exercises. The patient has been progressing with exercises for rotator cuff and scapular stabilization as well as improving range motion. Patient requires minimal to moderate cues for performance of exercise program and will benefit from further physical therapy.    Linette Is progressing well towards her goals.   Patient prognosis is Good.     Patient will continue to benefit from skilled outpatient physical therapy to address the deficits listed in the problem list box on initial evaluation, provide pt/family education and to maximize pt's level of independence in the home and community environment.     Patient's spiritual, cultural and educational needs considered and pt agreeable to plan of care and goals.     Anticipated barriers to physical therapy: None    SHORT TERM GOALS:  4 weeks  Progress Date Met   Recent signs and systems trend is improving in order to progress towards Long term goals.  [] Met  [] Not Met  [x] Progressing     Patient will be independent with Home Exercise Program  in order to further progress and  return to maximal function. [] Met  [] Not Met  [x] Progressing     Pain rating at Worst: 5 /10 in order to progress towards increased independence with activity. [] Met  [] Not Met  [x] Progressing     Patient will be able to correct postural deviations in sitting and standing, to decrease pain and promote postural awareness for injury prevention.  [] Met  [] Not Met  [x] Progressing     Patient will improve functional outcome (FOTO) score: by 5% to increase self-worth & perceived functional ability towards long term goals [] Met  [] Not Met  [x] Progressing        LONG TERM GOALS: 10 weeks  Progress Date Met   Patient will return to normal activites of daily living, recreational, and work related activities with less pain and limitation.  [] Met  [] Not Met  [x] Progressing     Patient will improve range of motion  to stated goals in order to return to maximal functional potential.  [] Met  [] Not Met  [x] Progressing     Patient will improve Strength to stated goals of appropriate musculature in order to improve functional independence.  [] Met  [] Not Met  [x] Progressing     Pain Rating at Best: 1/10 to improve Quality of Life.  [] Met  [] Not Met  [x] Progressing     Patient will meet predicted functional outcome (FOTO) score: 68% to increase self-worth & perceived functional ability. [] Met  [] Not Met  [x] Progressing     Patient will have met/partially met personal goal of: improve pain and function  [] Met  [] Not Met  [x] Progressing              PLAN   Plan of care Certification: 7/10/2024 to 9/8/2024     Outpatient Physical Therapy 2 times weekly for 10 weeks to include any combination of the following interventions: virtual visits, dry needling, modalities, electrical stimulation (IFC, Pre-Mod, Attended with Functional Dry Needling), Manual Therapy, Moist Heat/ Ice, Neuromuscular Re-ed, Patient Education, Self Care, Therapeutic Exercise, Functional Training, and Therapeutic Activites     Rodrigo Kelsey  PT

## 2024-07-29 ENCOUNTER — CLINICAL SUPPORT (OUTPATIENT)
Dept: REHABILITATION | Facility: HOSPITAL | Age: 59
End: 2024-07-29
Payer: COMMERCIAL

## 2024-07-29 DIAGNOSIS — M25.612 DECREASED ROM OF LEFT SHOULDER: Primary | ICD-10-CM

## 2024-07-29 DIAGNOSIS — R29.898 WEAKNESS OF LEFT SHOULDER: ICD-10-CM

## 2024-07-29 PROCEDURE — 97110 THERAPEUTIC EXERCISES: CPT | Mod: PN

## 2024-07-29 PROCEDURE — 97112 NEUROMUSCULAR REEDUCATION: CPT | Mod: PN

## 2024-07-31 ENCOUNTER — CLINICAL SUPPORT (OUTPATIENT)
Dept: REHABILITATION | Facility: HOSPITAL | Age: 59
End: 2024-07-31
Payer: COMMERCIAL

## 2024-07-31 DIAGNOSIS — R29.898 WEAKNESS OF LEFT SHOULDER: ICD-10-CM

## 2024-07-31 DIAGNOSIS — M25.612 DECREASED ROM OF LEFT SHOULDER: Primary | ICD-10-CM

## 2024-07-31 PROCEDURE — 97110 THERAPEUTIC EXERCISES: CPT | Mod: PN

## 2024-07-31 PROCEDURE — 97112 NEUROMUSCULAR REEDUCATION: CPT | Mod: PN

## 2024-07-31 NOTE — PROGRESS NOTES
[facility]   Physical Therapy Treatment Note      Name: Linette Schmidt  [facility] Number: [id number]    Therapy Diagnosis:   Encounter Diagnoses   Name Primary?    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     Treatment     Linette received the treatments listed below:        CPT Intervention  Joint:   Focus Duration / Intensity      X NMR UBE 3' FWD/ 3' BWD   X TE Shoulder wall slides 3x10   X NMR Rows (Matrix) 25# 3x10   X NMR Shoulder IR  Red tubing 3x10   X  NMR Shoulder ER Red tubing 3x10   X  NMR Standing cable shoulder extension 30# 3x10   X   Standing bicep curls 5# 3x10   X  TE Shoulder IR stretch with towel behind back 3x20 seconds   X  TE Supine shoulder flexion with wand  3x10 with hold 3#   X NMR  Open books 2x10 (B)   X NMR Serratus punch 4# 3x10   X TE Cross body stretch 3x20 seconds   X NMR Slide lying shoulder ER 3# 3x10   X NMR Prone T 1x10 1#  1x10   X  NMR Prone Y 2x10   X  TE Chest press 10# 3x10   X  TE Lateral raises  2# 2x10                                                       PLAN         CPT Codes available for Billing:   (00) minutes of Manual therapy (MT) to improve pain and  ROM.  (15) minutes of Therapeutic Exercise (TE) to develop strength, endurance, range of motion, and flexibility.  (40) minutes of Neuromuscular Re-Education (NMR)  to improve: Balance, Coordination, Kinesthetic, Sense, Proprioception, and Posture.  (00) minutes of Therapeutic Activities (TA) to improve functional performance.  Unattended Electrical Stimulation (ES) for muscle performance or pain modulation.  Vasopneumatic Device Therapy () for management of swelling/edema. (32302)  BFR: Blood flow restriction applied during exercise  NP or (-): Not Performed     Patient Education and Home Exercises       Education provided:   - Home program instruction    Written Home Exercises Provided: Patient instructed to cont prior HEP. Exercises were reviewed and Linette was able to demonstrate them prior to the end of the session.  Linette demonstrated good  understanding of the education provided. See Electronic Medical Record under Patient Instructions for exercises provided during therapy sessions    Assessment     The patient has soreness.  She is performing upper extremity and postural strengthening.  She has AROM WFL.  She has increased strength and will benefit from further PT.      Linette Is progressing well towards her goals.   Patient prognosis is Good.     Patient will continue to benefit from skilled outpatient physical therapy to address the deficits listed in the problem list box on initial evaluation, provide pt/family education and to maximize pt's level of independence in the home and community environment.     Patient's spiritual, cultural and educational needs considered and pt agreeable to plan of care and goals.     Anticipated barriers to physical therapy: None    SHORT TERM GOALS:  4 weeks  Progress Date Met   Recent signs and systems trend is improving in order to progress towards Long term goals.  [x] Met  [] Not Met  [] Progressing   7/29/2024   Patient will be independent with Home Exercise  Program  in order to further progress and return to maximal function. [x] Met  [] Not Met  [] Progressing   7/29/2024   Pain rating at Worst: 5 /10 in order to progress towards increased independence with activity. [x] Met  [] Not Met  [] Progressing   7/29/2024   Patient will be able to correct postural deviations in sitting and standing, to decrease pain and promote postural awareness for injury prevention.  [x] Met  [] Not Met  [] Progressing  7/29/2024   Patient will improve functional outcome (FOTO) score: by 5% to increase self-worth & perceived functional ability towards long term goals [] Met  [] Not Met  [x] Progressing        LONG TERM GOALS: 10 weeks  Progress Date Met   Patient will return to normal activites of daily living, recreational, and work related activities with less pain and limitation.  [] Met  [] Not Met  [x] Progressing     Patient will improve range of motion  to stated goals in order to return to maximal functional potential.  [] Met  [] Not Met  [x] Progressing     Patient will improve Strength to stated goals of appropriate musculature in order to improve functional independence.  [] Met  [] Not Met  [x] Progressing     Pain Rating at Best: 1/10 to improve Quality of Life.  [] Met  [] Not Met  [x] Progressing     Patient will meet predicted functional outcome (FOTO) score: 68% to increase self-worth & perceived functional ability. [] Met  [] Not Met  [x] Progressing     Patient will have met/partially met personal goal of: improve pain and function  [] Met  [] Not Met  [x] Progressing              PLAN   Plan of care Certification: 7/10/2024 to 9/8/2024     Outpatient Physical Therapy 2 times weekly for 10 weeks to include any combination of the following interventions: virtual visits, dry needling, modalities, electrical stimulation (IFC, Pre-Mod, Attended with Functional Dry Needling), Manual Therapy, Moist Heat/ Ice, Neuromuscular Re-ed, Patient Education, Self Care, Therapeutic  Exercise, Functional Training, and Therapeutic Activites     Rodrigo Kelsey, PT

## 2024-08-01 NOTE — PROGRESS NOTES
[facility]   Physical Therapy Treatment Note      Name: Linette Schmidt  [facility] Number: [id number]    Therapy Diagnosis:   Encounter Diagnoses   Name Primary?    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     Treatment     Linette received the treatments listed below:        CPT Intervention  Joint:   Focus Duration / Intensity      X NMR UBE 3' FWD/ 3' BWD   X TE Shoulder wall slides 3x10   X NMR Rows (Matrix) 25# 3x10   X NMR Shoulder IR  Red tubing 3x10   X  NMR Shoulder ER Red tubing 3x10   X  NMR Standing cable shoulder extension 30# 3x10   X   Standing bicep curls 5# 3x10   X  TE Shoulder IR stretch with towel behind back 3x20 seconds   X  TE Supine shoulder flexion with wand  3x10 with hold 3#   X NMR  Open books 2x10 (B)   X NMR Serratus punch 4# 3x10   X TE Cross body stretch 3x20 seconds   X NMR Slide lying shoulder ER 3# 3x10   X NMR Prone T 1x10 1#  1x10   X  NMR Prone Y 2x10   X  TE Chest press 10# 3x10   X  TE Lateral raises  2# 2x10                                                       PLAN         CPT Codes available for Billing:   (00) minutes of Manual therapy (MT) to improve pain and  ROM.  (15) minutes of Therapeutic Exercise (TE) to develop strength, endurance, range of motion, and flexibility.  (40) minutes of Neuromuscular Re-Education (NMR)  to improve: Balance, Coordination, Kinesthetic, Sense, Proprioception, and Posture.  (00) minutes of Therapeutic Activities (TA) to improve functional performance.  Unattended Electrical Stimulation (ES) for muscle performance or pain modulation.  Vasopneumatic Device Therapy () for management of swelling/edema. (86905)  BFR: Blood flow restriction applied during exercise  NP or (-): Not Performed     Patient Education and Home Exercises       Education provided:   - Home program instruction    Written Home Exercises Provided: Patient instructed to cont prior HEP. Exercises were reviewed and Linette was able to demonstrate them prior to the end of the session.  Linette demonstrated good  understanding of the education provided. See Electronic Medical Record under Patient Instructions for exercises provided during therapy sessions    Assessment     The patient has soreness.  She is performing upper extremity and postural strengthening.  She has AROM WFL.  She has increased strength and will benefit from further PT.      Linette Is progressing well towards her goals.   Patient prognosis is Good.     Patient will continue to benefit from skilled outpatient physical therapy to address the deficits listed in the problem list box on initial evaluation, provide pt/family education and to maximize pt's level of independence in the home and community environment.     Patient's spiritual, cultural and educational needs considered and pt agreeable to plan of care and goals.     Anticipated barriers to physical therapy: None    SHORT TERM GOALS:  4 weeks  Progress Date Met   Recent signs and systems trend is improving in order to progress towards Long term goals.  [x] Met  [] Not Met  [] Progressing   7/29/2024   Patient will be independent with Home Exercise  Program  in order to further progress and return to maximal function. [x] Met  [] Not Met  [] Progressing   7/29/2024   Pain rating at Worst: 5 /10 in order to progress towards increased independence with activity. [x] Met  [] Not Met  [] Progressing   7/29/2024   Patient will be able to correct postural deviations in sitting and standing, to decrease pain and promote postural awareness for injury prevention.  [x] Met  [] Not Met  [] Progressing  7/29/2024   Patient will improve functional outcome (FOTO) score: by 5% to increase self-worth & perceived functional ability towards long term goals [] Met  [] Not Met  [x] Progressing        LONG TERM GOALS: 10 weeks  Progress Date Met   Patient will return to normal activites of daily living, recreational, and work related activities with less pain and limitation.  [] Met  [] Not Met  [x] Progressing     Patient will improve range of motion  to stated goals in order to return to maximal functional potential.  [] Met  [] Not Met  [x] Progressing     Patient will improve Strength to stated goals of appropriate musculature in order to improve functional independence.  [] Met  [] Not Met  [x] Progressing     Pain Rating at Best: 1/10 to improve Quality of Life.  [] Met  [] Not Met  [x] Progressing     Patient will meet predicted functional outcome (FOTO) score: 68% to increase self-worth & perceived functional ability. [] Met  [] Not Met  [x] Progressing     Patient will have met/partially met personal goal of: improve pain and function  [] Met  [] Not Met  [x] Progressing              PLAN   Plan of care Certification: 7/10/2024 to 9/8/2024     Outpatient Physical Therapy 2 times weekly for 10 weeks to include any combination of the following interventions: virtual visits, dry needling, modalities, electrical stimulation (IFC, Pre-Mod, Attended with Functional Dry Needling), Manual Therapy, Moist Heat/ Ice, Neuromuscular Re-ed, Patient Education, Self Care, Therapeutic  Exercise, Functional Training, and Therapeutic Activites     Rodrigo Kelsey, PT

## 2024-08-05 ENCOUNTER — TELEPHONE (OUTPATIENT)
Dept: REHABILITATION | Facility: HOSPITAL | Age: 59
End: 2024-08-05
Payer: COMMERCIAL

## 2024-08-07 ENCOUNTER — CLINICAL SUPPORT (OUTPATIENT)
Dept: REHABILITATION | Facility: HOSPITAL | Age: 59
End: 2024-08-07
Payer: COMMERCIAL

## 2024-08-07 DIAGNOSIS — M25.612 DECREASED ROM OF LEFT SHOULDER: Primary | ICD-10-CM

## 2024-08-07 DIAGNOSIS — R29.898 WEAKNESS OF LEFT SHOULDER: ICD-10-CM

## 2024-08-07 PROCEDURE — 97112 NEUROMUSCULAR REEDUCATION: CPT | Mod: PN

## 2024-08-07 PROCEDURE — 97110 THERAPEUTIC EXERCISES: CPT | Mod: PN

## 2024-08-11 ENCOUNTER — PATIENT MESSAGE (OUTPATIENT)
Dept: ADMINISTRATIVE | Facility: OTHER | Age: 59
End: 2024-08-11
Payer: COMMERCIAL

## 2024-08-11 NOTE — PROGRESS NOTES
[facility]   Physical Therapy Treatment Note      Name: Linette Schmidt  [facility] Number: [id number]    Therapy Diagnosis:   Encounter Diagnoses   Name Primary?    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     Treatment     Linette received the treatments listed below:        CPT Intervention  Joint:   Focus Duration / Intensity      X NMR UBE 3' FWD/ 3' BWD   X TE Shoulder wall slides 3x10   X NMR Rows (Matrix) 25# 3x10   X NMR Shoulder IR  Red tubing 3x10   X  NMR Shoulder ER Red tubing 3x10   X  NMR Standing cable shoulder extension 30# 3x10   X   Standing bicep curls 5# 3x10   X  TE Shoulder IR stretch with towel behind back 3x20 seconds   X  TE Supine shoulder flexion with wand  3x10 with hold 3#   X NMR  Open books 2x10 (B)   X NMR Serratus punch 4# 3x10   X TE Cross body stretch 3x20 seconds   X NMR Slide lying shoulder ER 3# 3x10   X NMR Prone T 1x10 1#  1x10   X  NMR Prone Y 2x10   X  TE Chest press 10# 3x10   X  TE Lateral raises  2# 2x10                                                       PLAN         CPT Codes available for Billing:   (00) minutes of Manual therapy (MT) to improve  pain and ROM.  (15) minutes of Therapeutic Exercise (TE) to develop strength, endurance, range of motion, and flexibility.  (40) minutes of Neuromuscular Re-Education (NMR)  to improve: Balance, Coordination, Kinesthetic, Sense, Proprioception, and Posture.  (00) minutes of Therapeutic Activities (TA) to improve functional performance.  Unattended Electrical Stimulation (ES) for muscle performance or pain modulation.  Vasopneumatic Device Therapy () for management of swelling/edema. (78315)  BFR: Blood flow restriction applied during exercise  NP or (-): Not Performed     Patient Education and Home Exercises       Education provided:   - Home program instruction    Written Home Exercises Provided: Patient instructed to cont prior HEP. Exercises were reviewed and Linette was able to demonstrate them prior to the end of the session.  Linette demonstrated good  understanding of the education provided. See Electronic Medical Record under Patient Instructions for exercises provided during therapy sessions    Assessment     The patient has soreness.  She is performing upper extremity and postural strengthening.  She has AROM WFL.  She has increased strength and will benefit from further PT.      Linette Is progressing well towards her goals.   Patient prognosis is Good.     Patient will continue to benefit from skilled outpatient physical therapy to address the deficits listed in the problem list box on initial evaluation, provide pt/family education and to maximize pt's level of independence in the home and community environment.     Patient's spiritual, cultural and educational needs considered and pt agreeable to plan of care and goals.     Anticipated barriers to physical therapy: None    SHORT TERM GOALS:  4 weeks  Progress Date Met   Recent signs and systems trend is improving in order to progress towards Long term goals.  [x] Met  [] Not Met  [] Progressing   7/29/2024   Patient will be independent with Home  Exercise Program  in order to further progress and return to maximal function. [x] Met  [] Not Met  [] Progressing   7/29/2024   Pain rating at Worst: 5 /10 in order to progress towards increased independence with activity. [x] Met  [] Not Met  [] Progressing   7/29/2024   Patient will be able to correct postural deviations in sitting and standing, to decrease pain and promote postural awareness for injury prevention.  [x] Met  [] Not Met  [] Progressing  7/29/2024   Patient will improve functional outcome (FOTO) score: by 5% to increase self-worth & perceived functional ability towards long term goals [] Met  [] Not Met  [x] Progressing        LONG TERM GOALS: 10 weeks  Progress Date Met   Patient will return to normal activites of daily living, recreational, and work related activities with less pain and limitation.  [] Met  [] Not Met  [x] Progressing     Patient will improve range of motion  to stated goals in order to return to maximal functional potential.  [] Met  [] Not Met  [x] Progressing     Patient will improve Strength to stated goals of appropriate musculature in order to improve functional independence.  [] Met  [] Not Met  [x] Progressing     Pain Rating at Best: 1/10 to improve Quality of Life.  [] Met  [] Not Met  [x] Progressing     Patient will meet predicted functional outcome (FOTO) score: 68% to increase self-worth & perceived functional ability. [] Met  [] Not Met  [x] Progressing     Patient will have met/partially met personal goal of: improve pain and function  [] Met  [] Not Met  [x] Progressing              PLAN   Plan of care Certification: 7/10/2024 to 9/8/2024     Outpatient Physical Therapy 2 times weekly for 10 weeks to include any combination of the following interventions: virtual visits, dry needling, modalities, electrical stimulation (IFC, Pre-Mod, Attended with Functional Dry Needling), Manual Therapy, Moist Heat/ Ice, Neuromuscular Re-ed, Patient Education, Self Care,  Therapeutic Exercise, Functional Training, and Therapeutic Activites     Rodrigo Kelsey, PT

## 2024-08-12 ENCOUNTER — CLINICAL SUPPORT (OUTPATIENT)
Dept: REHABILITATION | Facility: HOSPITAL | Age: 59
End: 2024-08-12
Payer: COMMERCIAL

## 2024-08-12 DIAGNOSIS — M25.612 DECREASED ROM OF LEFT SHOULDER: Primary | ICD-10-CM

## 2024-08-12 DIAGNOSIS — R29.898 WEAKNESS OF LEFT SHOULDER: ICD-10-CM

## 2024-08-12 PROCEDURE — 97110 THERAPEUTIC EXERCISES: CPT | Mod: PN

## 2024-08-12 PROCEDURE — 97112 NEUROMUSCULAR REEDUCATION: CPT | Mod: PN

## 2024-08-13 DIAGNOSIS — M81.0 AGE-RELATED OSTEOPOROSIS WITHOUT CURRENT PATHOLOGICAL FRACTURE: ICD-10-CM

## 2024-08-13 RX ORDER — ABALOPARATIDE 2000 UG/ML
80 INJECTION, SOLUTION SUBCUTANEOUS DAILY
Qty: 1.56 ML | Refills: 11 | Status: SHIPPED | OUTPATIENT
Start: 2024-08-13 | End: 2025-08-13

## 2024-08-14 ENCOUNTER — CLINICAL SUPPORT (OUTPATIENT)
Dept: REHABILITATION | Facility: HOSPITAL | Age: 59
End: 2024-08-14
Payer: COMMERCIAL

## 2024-08-14 DIAGNOSIS — R29.898 WEAKNESS OF LEFT SHOULDER: ICD-10-CM

## 2024-08-14 DIAGNOSIS — M25.612 DECREASED ROM OF LEFT SHOULDER: Primary | ICD-10-CM

## 2024-08-14 PROCEDURE — 97112 NEUROMUSCULAR REEDUCATION: CPT | Mod: PN

## 2024-08-14 PROCEDURE — 97110 THERAPEUTIC EXERCISES: CPT | Mod: PN

## 2024-08-14 NOTE — PROGRESS NOTES
[facility]   Physical Therapy Treatment Note      Name: Linette Schmidt  [facility] Number: [id number]    Therapy Diagnosis:   Encounter Diagnoses   Name Primary?    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  Evaluation Date: 7/10/2024  Authorization Period Expiration: 7/11/2024  Plan of Care Expiration: 9/8/2024                  Progress Update: 8/9/2024     Visit # / Visits authorized: 1 / 1 : 8/20        FOTO: 7/10/2024 - Scored: 1 / 3          PTA Visit #: 0/5     Time in: 0820  Time out: 0915  Total Time: 55 minutes    Subjective     Patient reports:.She does not have very much pain unless she reaches over there back of the car seat    Response to previous treatment: good  Functional change:     Pain: 3/10  Location: Bilateral shoulder      Objective      Objective Measures updated at progress report unless specified.     Treatment     Linette received the treatments listed below:        CPT Intervention  Joint:   Focus Duration / Intensity      X NMR UBE 3' FWD/ 3' BWD   X TE Shoulder wall slides 3x10   X NMR Rows (Matrix) 25# 3x10   X NMR Shoulder IR  Red tubing 3x10   X  NMR Shoulder ER Red tubing 3x10   X  NMR Standing cable shoulder extension 30# 3x10   X   Standing bicep curls 5# 3x10   X  TE Shoulder IR stretch with towel behind back 3x20 seconds   X  TE Supine shoulder flexion with wand  3x10 with hold 3#   X NMR  Open books 2x10 (B)   X NMR Serratus punch 4# 3x10   X TE Cross body stretch 3x20 seconds   X NMR Slide lying shoulder ER 3# 3x10   X NMR Prone T 1x10 1#  1x10   X  NMR Prone Y 2x10   X  TE Chest press 10# 3x10   X  TE Lateral raises  2# 2x10                                                       PLAN         CPT Codes available for Billing:    (00) minutes of Manual therapy (MT) to improve pain and ROM.  (15) minutes of Therapeutic Exercise (TE) to develop strength, endurance, range of motion, and flexibility.  (40) minutes of Neuromuscular Re-Education (NMR)  to improve: Balance, Coordination, Kinesthetic, Sense, Proprioception, and Posture.  (00) minutes of Therapeutic Activities (TA) to improve functional performance.  Unattended Electrical Stimulation (ES) for muscle performance or pain modulation.  Vasopneumatic Device Therapy () for management of swelling/edema. (87248)  BFR: Blood flow restriction applied during exercise  NP or (-): Not Performed     Patient Education and Home Exercises       Education provided:   - Home program instruction    Written Home Exercises Provided: Patient instructed to cont prior HEP. Exercises were reviewed and Linette was able to demonstrate them prior to the end of the session.  Linette demonstrated good  understanding of the education provided. See Electronic Medical Record under Patient Instructions for exercises provided during therapy sessions    Assessment     The patient has soreness.  She is performing upper extremity and postural strengthening.  She has AROM WFL.  She has increased strength and will benefit from further PT.      Linette Is progressing well towards her goals.   Patient prognosis is Good.     Patient will continue to benefit from skilled outpatient physical therapy to address the deficits listed in the problem list box on initial evaluation, provide pt/family education and to maximize pt's level of independence in the home and community environment.     Patient's spiritual, cultural and educational needs considered and pt agreeable to plan of care and goals.     Anticipated barriers to physical therapy: None    SHORT TERM GOALS:  4 weeks  Progress Date Met   Recent signs and systems trend is improving in order to progress towards Long term goals.  [x] Met  [] Not Met  [] Progressing    7/29/2024   Patient will be independent with Home Exercise Program  in order to further progress and return to maximal function. [x] Met  [] Not Met  [] Progressing   7/29/2024   Pain rating at Worst: 5 /10 in order to progress towards increased independence with activity. [x] Met  [] Not Met  [] Progressing   7/29/2024   Patient will be able to correct postural deviations in sitting and standing, to decrease pain and promote postural awareness for injury prevention.  [x] Met  [] Not Met  [] Progressing  7/29/2024   Patient will improve functional outcome (FOTO) score: by 5% to increase self-worth & perceived functional ability towards long term goals [] Met  [] Not Met  [x] Progressing        LONG TERM GOALS: 10 weeks  Progress Date Met   Patient will return to normal activites of daily living, recreational, and work related activities with less pain and limitation.  [] Met  [] Not Met  [x] Progressing     Patient will improve range of motion  to stated goals in order to return to maximal functional potential.  [] Met  [] Not Met  [x] Progressing     Patient will improve Strength to stated goals of appropriate musculature in order to improve functional independence.  [] Met  [] Not Met  [x] Progressing     Pain Rating at Best: 1/10 to improve Quality of Life.  [] Met  [] Not Met  [x] Progressing     Patient will meet predicted functional outcome (FOTO) score: 68% to increase self-worth & perceived functional ability. [] Met  [] Not Met  [x] Progressing     Patient will have met/partially met personal goal of: improve pain and function  [] Met  [] Not Met  [x] Progressing              PLAN   Plan of care Certification: 7/10/2024 to 9/8/2024     Outpatient Physical Therapy 2 times weekly for 10 weeks to include any combination of the following interventions: virtual visits, dry needling, modalities, electrical stimulation (IFC, Pre-Mod, Attended with Functional Dry Needling), Manual Therapy, Moist Heat/ Ice,  Neuromuscular Re-ed, Patient Education, Self Care, Therapeutic Exercise, Functional Training, and Therapeutic Activites     Rodrigo Kelsey, PT

## 2024-08-15 ENCOUNTER — CLINICAL SUPPORT (OUTPATIENT)
Dept: INTERNAL MEDICINE | Facility: CLINIC | Age: 59
End: 2024-08-15

## 2024-08-15 ENCOUNTER — OFFICE VISIT (OUTPATIENT)
Dept: INTERNAL MEDICINE | Facility: CLINIC | Age: 59
End: 2024-08-15

## 2024-08-15 ENCOUNTER — HOSPITAL ENCOUNTER (OUTPATIENT)
Dept: CARDIOLOGY | Facility: HOSPITAL | Age: 59
Discharge: HOME | End: 2024-08-15
Attending: FAMILY MEDICINE

## 2024-08-15 ENCOUNTER — CLINICAL SUPPORT (OUTPATIENT)
Dept: INTERNAL MEDICINE | Facility: CLINIC | Age: 59
End: 2024-08-15
Payer: COMMERCIAL

## 2024-08-15 VITALS
TEMPERATURE: 98 F | HEART RATE: 86 BPM | OXYGEN SATURATION: 100 % | DIASTOLIC BLOOD PRESSURE: 77 MMHG | BODY MASS INDEX: 25.39 KG/M2 | RESPIRATION RATE: 18 BRPM | SYSTOLIC BLOOD PRESSURE: 124 MMHG | HEIGHT: 66 IN | WEIGHT: 158 LBS

## 2024-08-15 DIAGNOSIS — I10 ESSENTIAL HYPERTENSION: Chronic | ICD-10-CM

## 2024-08-15 DIAGNOSIS — E78.2 MIXED HYPERLIPIDEMIA: Chronic | ICD-10-CM

## 2024-08-15 DIAGNOSIS — Z00.00 ROUTINE GENERAL MEDICAL EXAMINATION AT A HEALTH CARE FACILITY: Primary | ICD-10-CM

## 2024-08-15 DIAGNOSIS — Z00.00 ROUTINE GENERAL MEDICAL EXAMINATION AT A HEALTH CARE FACILITY: ICD-10-CM

## 2024-08-15 DIAGNOSIS — Z86.39 HISTORY OF OBESITY: Chronic | ICD-10-CM

## 2024-08-15 DIAGNOSIS — M81.0 AGE-RELATED OSTEOPOROSIS WITHOUT CURRENT PATHOLOGICAL FRACTURE: Chronic | ICD-10-CM

## 2024-08-15 DIAGNOSIS — J45.20 MILD INTERMITTENT ASTHMA WITHOUT COMPLICATION: Chronic | ICD-10-CM

## 2024-08-15 DIAGNOSIS — Z00.00 PREVENTATIVE HEALTH CARE: Primary | ICD-10-CM

## 2024-08-15 DIAGNOSIS — E66.3 OVERWEIGHT (BMI 25.0-29.9): ICD-10-CM

## 2024-08-15 LAB
ALBUMIN SERPL BCP-MCNC: 4.3 G/DL (ref 3.5–5.2)
ALP SERPL-CCNC: 55 U/L (ref 55–135)
ALT SERPL W/O P-5'-P-CCNC: 14 U/L (ref 10–44)
ANION GAP SERPL CALC-SCNC: 12 MMOL/L (ref 8–16)
AST SERPL-CCNC: 16 U/L (ref 10–40)
BILIRUB SERPL-MCNC: 1.2 MG/DL (ref 0.1–1)
BUN SERPL-MCNC: 23 MG/DL (ref 6–20)
CALCIUM SERPL-MCNC: 9.9 MG/DL (ref 8.7–10.5)
CHLORIDE SERPL-SCNC: 107 MMOL/L (ref 95–110)
CHOLEST SERPL-MCNC: 136 MG/DL (ref 120–199)
CHOLEST/HDLC SERPL: 2.7 {RATIO} (ref 2–5)
CO2 SERPL-SCNC: 24 MMOL/L (ref 23–29)
CREAT SERPL-MCNC: 0.8 MG/DL (ref 0.5–1.4)
ERYTHROCYTE [DISTWIDTH] IN BLOOD BY AUTOMATED COUNT: 12.6 % (ref 11.5–14.5)
EST. GFR  (NO RACE VARIABLE): >60 ML/MIN/1.73 M^2
ESTIMATED AVG GLUCOSE: 85 MG/DL (ref 68–131)
GLUCOSE SERPL-MCNC: 78 MG/DL (ref 70–110)
HBA1C MFR BLD: 4.6 % (ref 4–5.6)
HCT VFR BLD AUTO: 37.4 % (ref 37–48.5)
HDLC SERPL-MCNC: 50 MG/DL (ref 40–75)
HDLC SERPL: 36.8 % (ref 20–50)
HGB BLD-MCNC: 13.1 G/DL (ref 12–16)
LDLC SERPL CALC-MCNC: 74 MG/DL (ref 63–159)
MCH RBC QN AUTO: 29.9 PG (ref 27–31)
MCHC RBC AUTO-ENTMCNC: 35 G/DL (ref 32–36)
MCV RBC AUTO: 85 FL (ref 82–98)
NONHDLC SERPL-MCNC: 86 MG/DL
PLATELET # BLD AUTO: 222 K/UL (ref 150–450)
PMV BLD AUTO: 11.7 FL (ref 9.2–12.9)
POTASSIUM SERPL-SCNC: 4.2 MMOL/L (ref 3.5–5.1)
PROT SERPL-MCNC: 7 G/DL (ref 6–8.4)
RBC # BLD AUTO: 4.38 M/UL (ref 4–5.4)
SODIUM SERPL-SCNC: 143 MMOL/L (ref 136–145)
TRIGL SERPL-MCNC: 60 MG/DL (ref 30–150)
TSH SERPL DL<=0.005 MIU/L-ACNC: 2.29 UIU/ML (ref 0.4–4)
WBC # BLD AUTO: 7.53 K/UL (ref 3.9–12.7)

## 2024-08-15 PROCEDURE — 85027 COMPLETE CBC AUTOMATED: CPT | Performed by: FAMILY MEDICINE

## 2024-08-15 PROCEDURE — 80053 COMPREHEN METABOLIC PANEL: CPT | Performed by: FAMILY MEDICINE

## 2024-08-15 PROCEDURE — 80061 LIPID PANEL: CPT | Performed by: FAMILY MEDICINE

## 2024-08-15 PROCEDURE — 93010 ELECTROCARDIOGRAM REPORT: CPT | Mod: ,,, | Performed by: INTERNAL MEDICINE

## 2024-08-15 PROCEDURE — 84443 ASSAY THYROID STIM HORMONE: CPT | Performed by: FAMILY MEDICINE

## 2024-08-15 PROCEDURE — 83036 HEMOGLOBIN GLYCOSYLATED A1C: CPT | Performed by: FAMILY MEDICINE

## 2024-08-15 PROCEDURE — 93005 ELECTROCARDIOGRAM TRACING: CPT

## 2024-08-15 PROCEDURE — 99396 PREV VISIT EST AGE 40-64: CPT | Mod: ,,, | Performed by: FAMILY MEDICINE

## 2024-08-15 PROCEDURE — 99999 PR PBB SHADOW E&M-EST. PATIENT-LVL IV: CPT | Mod: PBBFAC,,, | Performed by: FAMILY MEDICINE

## 2024-08-15 RX ORDER — TIRZEPATIDE 5 MG/.5ML
5 INJECTION, SOLUTION SUBCUTANEOUS
Qty: 4 PEN | Refills: 2 | Status: SHIPPED | OUTPATIENT
Start: 2024-08-15

## 2024-08-15 RX ORDER — ALBUTEROL SULFATE 90 UG/1
2 INHALANT RESPIRATORY (INHALATION) EVERY 6 HOURS PRN
Qty: 20.1 G | Refills: 3 | Status: SHIPPED | OUTPATIENT
Start: 2024-08-15

## 2024-08-15 NOTE — LETTER
"Dear Linette,    It was a pleasure seeing you for your recent Executive Health Exam. I'm so happy that yo have found success with Zepbound in managing your weight. It's wonderful to see your commitment to maintaining a healthy diet and managing your weight effectively.    I wanted to provide you with a summary of your Executive Health Exam and the test results we reviewed together.    During your exam, we addressed several ongoing health concerns, including your history of obesity, your current status of being overweight, essential hypertension (high blood pressure), hyperlipidemia, and mild intermittent asthma. We also noted improvement in your age-related osteoporosis, which has progressed to osteopenia with the help of bisphosphonate therapy.    Your vital signs at the time of your exam showed a blood pressure of 124/77, which is within a healthy range (typically less than 130/80). Your pulse was 86 beats per minute, and your oxygen saturation was excellent at 100%. Your current weight is 158 pounds, resulting in a BMI of 25.5, which places you at the lower end of the "overweight" category (BMI of 25-29.9). It's encouraging to see the positive trend in your weight and BMI over the past months. Your blood pressure has been stable and within a healthy range in all recent readings. Maintaining this progress is important, and I encourage you to continue with the healthy changes you have been making.    Regarding your tobacco and alcohol use, I'm pleased to note that you have never smoked and only drink alcohol socially, which is a healthy pattern. These are excellent choices that contribute significantly to your overall health.    Looking at other aspects of your health, I see no concerns regarding your social determinants of health, such as housing stability, financial resources, or access to food and transportation. You are managing stress well, and it's good to see that you are aware of the importance of regular " physical activity. Although you are exercising three days a week, adding more days or slightly increasing the duration might help you reach an ideal activity level.    Your current medications include:   - Abaloparatide (TYMLOS) 80 mcg   - Aspirin (ECOTRIN) 81 mg   - Pravastatin (PRAVACHOL) 80 mg   - Tretinoin microspheres (RETIN-A MICRO PUMP) 0.06%   - Albuterol (PROVENTIL/VENTOLIN HFA) 90 mcg/actuation   - Tirzepatide (ZEPBOUND) 5 mg    Your lab results from the exam are mostly within normal ranges:   - The Complete Blood Count (CBC), which evaluates overall health and detects a variety of disorders such as anemia and infection, showed normal values.   - The Comprehensive Metabolic Panel (CMP), which helps assess kidney function, liver function, blood sugar levels, and electrolytes, was largely normal. There were slight elevations in BUN (Blood Urea Nitrogen) and total bilirubin, but these are not clinically significant and do not require further action at this time.   - Your Lipid Panel, which measures cholesterol and triglyceride levels, also shows good results, with all values in the desired ranges.   - Your Hemoglobin A1c, which measures average blood sugar over the past three months, was 4.6%, a normal value.   - Your TSH (Thyroid-Stimulating Hormone) level was 2.291, which is also within the normal range, indicating normal thyroid function.    Reviewing your historical lab data, there have been stable trends in all tested areas, with no significant changes over time.    Your recent electrocardiogram (ECG) showed normal sinus rhythm, with no significant changes compared to your previous ECG. This indicates that there are no current concerns with your heart's electrical activity.    For health maintenance, I'm pleased to report that you are up to date on several important screenings and vaccinations, including your tetanus vaccine, mammogram, and lipid panel. However, the hepatitis C screening and pneumococcal  vaccines are due. If you have completed these tests or vaccinations elsewhere, please provide us with the relevant documentation so we can update your records.    To continue promoting your health, I encourage you to stay active, maintain a balanced diet, and manage your stress effectively. Consider adding more physical activity to your routine, such as an extra day of exercise or a few more minutes per session, to reach an optimal activity level.    As your primary care physician, I look forward to seeing you at your next Executive Health Exam. Please don't hesitate to contact me with any new concerns that develop in the meantime, and feel free to ask any questions you may have about your results.    Thank you for trusting me and [facility] with your healthcare.    Warmest regards,     CORI Epps MD

## 2024-08-15 NOTE — ASSESSMENT & PLAN NOTE
"Estimated body mass index is 25.5 kg/m² as calculated from the following:    Height as of this encounter: 5' 6" (1.676 m).    Weight as of this encounter: 71.7 kg (158 lb).     "

## 2024-08-15 NOTE — PROGRESS NOTES
[facility] ENCOUNTER  8/15/24      REASON FOR VISIT  [facility]    PCP (Primary Care Provider)  I am Linette's PCP.    HISTORY  History of Present Illness    Linette presents today for follow up.    She reports significant weight loss of 25 lbs over the last 6-7 months. Her peak weight was 230 lbs around 2017, corresponding to a BMI of 37.1 kg/m². She is currently taking Zepbound 5 mg for weight management, which has been beneficial in helping her recognize satiety. She acknowledges the need to maintain a proper diet to sustain weight loss and is tolerating the medication well.    She continues pravastatin as prescribed. Ramipril was discontinued due to low blood pressure. She uses albuterol inhaler approximately every other week, primarily triggered by cat exposure and physical exertion. She no longer requires hydrocortisone ointment or metronidazole cream. She discontinued her multivitamin due to concerns about calcium and vitamin D interactions.    Her asthma is triggered by exposure to cats and exacerbated by physical exertion. She uses albuterol inhaler approximately every other week, denying need for it more than once or twice weekly.    She has a history of low blood pressure, with previous readings in the 90s. She notes occasional spikes to the 130s after poor dietary choices. Ramipril was discontinued due to her blood pressure becoming too low.    She is nearing completion of prescribed physical therapy sessions, with only two days remaining. She expresses a desire for home exercise instructions and has been proactive in performing exercises at home to the best of her ability. She emphasizes the need for a specific list of exercises to continue her therapy independently.       Assessment & Plan     Reviewed patient's significant weight loss of 25 lbs since January, noting peak weight of 230 lbs (BMI 37.1 kg/m2) in 2017   Assessed effectiveness of Zepbound (semaglutide) for weight management    Evaluated blood pressure control, determining Ramipril is no longer necessary due to low readings   Considered albuterol usage frequency, noting it is within acceptable range (less than 2 times weekly)  J45.909 UNSPECIFIED ASTHMA, UNCOMPLICATED:   Continued Albuterol inhaler as needed, up to 2 times weekly.   The patient uses albuterol inhaler every other week or so, mainly triggered by feline exposure or heavy exertion.   Performed a lung exam and found the lungs to be clear.   Assessed that current albuterol usage is appropriate, but more frequent use would require discussion about controller medicine.   Prescribed albuterol inhaler, 6.7 g, with 3 inhalers provided at a time from [facility] pharmacy.  E66.01 MORBID (SEVERE) OBESITY DUE TO EXCESS CALORIES:   Continued Zepbound 5mg, providing 1 month supply with 2 refills.   The patient has lost 25 lbs in the last 6-7 months.   The patient's peak weight was 230 lbs a few years ago, equating to a BMI of 37.1 kg/m2.   Acknowledged the patient's significant weight loss and encouraged continued progress.   Scheduled follow-up for virtual video visit before Zepbound refills run out.   Planned follow-up at least every 3 months to monitor progress on Zepbound.  Z79.899 OTHER LONG TERM (CURRENT) DRUG THERAPY:   Explained that Ramipril is a medication that should be taken consistently if prescribed, not intermittently.   Instructed the patient to dispose of unused Ramipril at [facility] disposal location.   Continued Pravastatin at current dose.   Discontinued Ramipril due to low blood pressure readings.   Reviewed current medications including Zepbound for weight management, pravastatin for cholesterol, and albuterol as needed for asthma.  Z71.3 DIETARY COUNSELING AND SURVEILLANCE:   The patient acknowledges the importance of proper eating habits in conjunction with medication for weight management.  Z68.37 BODY MASS INDEX [BMI] 37.0-37.9, ADULT:   Noted that the patient's  "peak weight was 230 lbs, which equated to a BMI of 37.1 kg/m2.          Review of Systems   Constitutional: Negative.    HENT: Negative.     Eyes: Negative.    Respiratory: Negative.     Cardiovascular: Negative.    Gastrointestinal: Negative.    Endocrine: Negative.    Musculoskeletal: Negative.    Integumentary:  Negative.   Neurological: Negative.    Psychiatric/Behavioral: Negative.       ASSESSMENT/PLAN  1. Preventative health care    2. History of obesity  Overview:  Peak weight = 230 lbs (BMI 37.1 kg/m²) in approximately 2017.  No history or family history of thyroid cancer or multiple endocrine neoplasia syndrome.     Orders:  -     tirzepatide, weight loss, (ZEPBOUND) 5 mg/0.5 mL PnIj; Inject 5 mg into the skin every 7 days.  Dispense: 4 Pen; Refill: 2    3. Overweight (BMI 25.0-29.9)  Assessment & Plan:  Estimated body mass index is 25.5 kg/m² as calculated from the following:    Height as of this encounter: 5' 6" (1.676 m).    Weight as of this encounter: 71.7 kg (158 lb).       Orders:  -     tirzepatide, weight loss, (ZEPBOUND) 5 mg/0.5 mL PnIj; Inject 5 mg into the skin every 7 days.  Dispense: 4 Pen; Refill: 2    4. Essential hypertension  Overview:  Cardiologist: Dr. Marky Armendariz    Orders:  -     tirzepatide, weight loss, (ZEPBOUND) 5 mg/0.5 mL PnIj; Inject 5 mg into the skin every 7 days.  Dispense: 4 Pen; Refill: 2    5. Mixed hyperlipidemia  Overview:  CARDIOLOGIST: Dr. Marky Armendariz    Orders:  -     tirzepatide, weight loss, (ZEPBOUND) 5 mg/0.5 mL PnIj; Inject 5 mg into the skin every 7 days.  Dispense: 4 Pen; Refill: 2    6. Age-related osteoporosis, improved to osteopenia with bisphosphinate therapy    7. Mild intermittent asthma without complication  -     albuterol (PROVENTIL/VENTOLIN HFA) 90 mcg/actuation inhaler; Inhale 2 puffs into the lungs every 6 (six) hours as needed for Wheezing. Rescue  Dispense: 20.1 g; Refill: 3      PHYSICAL EXAM  Vitals:    08/15/24 0918   BP: 124/77   Pulse: 86 " "  Resp: 18   Temp: 98.3 °F (36.8 °C)   SpO2: 100%   Weight: 71.7 kg (158 lb)   Height: 5' 6" (1.676 m)   Physical Exam  Vitals reviewed.   Constitutional:       General: She is not in acute distress.     Appearance: Normal appearance. She is not ill-appearing, toxic-appearing or diaphoretic.   HENT:      Head: Normocephalic and atraumatic.      Right Ear: Tympanic membrane, ear canal and external ear normal.      Left Ear: Tympanic membrane, ear canal and external ear normal.   Eyes:      General: No scleral icterus.     Conjunctiva/sclera: Conjunctivae normal.   Neck:      Thyroid: No thyroid mass, thyromegaly or thyroid tenderness.      Vascular: No carotid bruit.   Cardiovascular:      Rate and Rhythm: Normal rate and regular rhythm.      Heart sounds: Normal heart sounds.   Pulmonary:      Effort: Pulmonary effort is normal.      Breath sounds: Normal breath sounds.   Abdominal:      General: Bowel sounds are normal. There is no distension.      Palpations: Abdomen is soft. There is no mass.      Tenderness: There is no abdominal tenderness.   Musculoskeletal:         General: No tenderness.      Cervical back: No muscular tenderness.   Lymphadenopathy:      Cervical: No cervical adenopathy.   Skin:     General: Skin is warm and dry.      Coloration: Skin is not jaundiced.   Neurological:      General: No focal deficit present.      Mental Status: She is alert and oriented to person, place, and time. Mental status is at baseline.      Cranial Nerves: No cranial nerve deficit.      Gait: Gait normal.   Psychiatric:         Mood and Affect: Mood normal.         Behavior: Behavior normal.         Judgment: Judgment normal.         MEDICATIONS  Linette has a current medication list which includes the following prescription(s): tymlos, aspirin, inhalation spacing device, pravastatin, retin-a micro pump, albuterol, and zepbound.    HEALTH MAINTENANCE AND SCREENINGS - UP TO DATE  Health Maintenance Topics with due " status: Not Due       Topic Last Completion Date    TETANUS VACCINE 08/24/2018    Colorectal Cancer Screening 03/22/2021    Influenza Vaccine 07/07/2023    Mammogram 07/09/2024    Hemoglobin A1c (Diabetic Prevention Screening) 08/15/2024    Lipid Panel 08/15/2024     HEALTH MAINTENANCE AND SCREENINGS - DUE OR DUE SOON  Health Maintenance Due   Topic Date Due    Hepatitis C Screening  Never done    Pneumococcal Vaccines (Age 0-64) (2 of 2 - PCV) 06/22/2021     FOLLOW-UP  Linette is to follow up with me for any health problems or concerns, any abnormal test results, and any age-appropriate health maintenance interventions and screenings that may be due.    PROBLEM LIST  Linette has Mild intermittent asthma without complication; Mixed hyperlipidemia; Essential hypertension; Lichen sclerosus; Age-related osteoporosis, improved to osteopenia with bisphosphinate therapy; Overweight (BMI 25.0-29.9); Prominent abdominal aortic pulsation; History of obesity; Decreased ROM of left shoulder; and Weakness of left shoulder on their problem list.    PAST MEDICAL HISTORY  Linette has a past medical history of Asthma and Osteoporosis.    SURGICAL HISTORY  Linette has a past surgical history that includes Partial hysterectomy; Finger fracture surgery (Left); and Hysterectomy.    FAMILY HISTORY  Linette family history includes Hypertension in her father, mother, and sister.     ALLERGIES  Linette reports she has No Known Allergies.    SOCIAL HISTORY  Linette  reports that she has never smoked. She has never used smokeless tobacco. She reports current alcohol use. She reports that she does not use drugs.     This note was generated with the assistance of ambient listening technology. Verbal consent was obtained by the patient and accompanying visitor(s) for the recording of patient appointment to facilitate this note. I attest to having reviewed and edited the generated note for accuracy, though some syntax or spelling errors  "may persist. Please contact the author of this note for any clarification.    Documentation entered by me for this encounter may have been done in part using speech-recognition technology. Although I have made an effort to ensure accuracy, "sound like" errors may exist and should be interpreted in context.  "

## 2024-08-16 LAB
OHS QRS DURATION: 74 MS
OHS QTC CALCULATION: 428 MS

## 2024-08-18 NOTE — PROGRESS NOTES
[facility]   Physical Therapy Treatment Note      Name: Linette Schmidt  [facility] Number: [id number]    Therapy Diagnosis:   Encounter Diagnoses   Name Primary?    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     Treatment     Linette received the treatments listed below:      CPT Intervention  Joint:   Focus Duration /Intensity      NMR UBE 3' FWD/ 3' BWD    TE Shoulder wall slides 3x10   NMR Rows (Matrix) 25# 3x10   NMR Shoulder IR Red Tubing 3x10    NMR Shoulder ER Red Tubing 3x10    NMR Standing cable shoulder extension 30# 3x10     Standing bicep curls 5# 3x10    TE Shoulder IR stretch with towel behind back 3x20 seconds     TE Supine shoulder flexion with wand  3x10 with hold 4#   NMR  Open books 2x10 (B)   NMR Serratus punch 5# 3x10   TE Cross body stretch 3x20 seconds    NMR Slide lying shoulder ER 3# 3x10    NMR Prone T 2x10 1#     Prone Y 2x10     Chest press       Lateral raises  2# 2x10                                           PLAN         CPT Codes available for Billing:   (00) minutes of Manual therapy (MT) to improve pain and ROM.  (15) minutes of Therapeutic Exercise (TE) to  develop strength, endurance, range of motion, and flexibility.  (40) minutes of Neuromuscular Re-Education (NMR)  to improve: Balance, Coordination, Kinesthetic, Sense, Proprioception, and Posture.  (00) minutes of Therapeutic Activities (TA) to improve functional performance.  Unattended Electrical Stimulation (ES) for muscle performance or pain modulation.  Vasopneumatic Device Therapy () for management of swelling/edema. (38004)  BFR: Blood flow restriction applied during exercise  NP or (-): Not Performed     Patient Education and Home Exercises       Education provided:   - Home program instruction    Written Home Exercises Provided: Patient instructed to cont prior HEP. Exercises were reviewed and Linette was able to demonstrate them prior to the end of the session.  Linette demonstrated good  understanding of the education provided. See Electronic Medical Record under Patient Instructions for exercises provided during therapy sessions    Assessment     The patient has soreness.  She is performing upper extremity and postural strengthening.  She has AROM WFL.  She has increased strength and will benefit from further PT.      Linette Is progressing well towards her goals.   Patient prognosis is Good.     Patient will continue to benefit from skilled outpatient physical therapy to address the deficits listed in the problem list box on initial evaluation, provide pt/family education and to maximize pt's level of independence in the home and community environment.     Patient's spiritual, cultural and educational needs considered and pt agreeable to plan of care and goals.     Anticipated barriers to physical therapy: None    SHORT TERM GOALS:  4 weeks  Progress Date Met   Recent signs and systems trend is improving in order to progress towards Long term goals.  [x] Met  [] Not Met  [] Progressing   7/29/2024   Patient will be independent with Home Exercise Program  in order to further progress and return to  maximal function. [x] Met  [] Not Met  [] Progressing   7/29/2024   Pain rating at Worst: 5 /10 in order to progress towards increased independence with activity. [x] Met  [] Not Met  [] Progressing   7/29/2024   Patient will be able to correct postural deviations in sitting and standing, to decrease pain and promote postural awareness for injury prevention.  [x] Met  [] Not Met  [] Progressing  7/29/2024   Patient will improve functional outcome (FOTO) score: by 5% to increase self-worth & perceived functional ability towards long term goals [] Met  [] Not Met  [x] Progressing        LONG TERM GOALS: 10 weeks  Progress Date Met   Patient will return to normal activites of daily living, recreational, and work related activities with less pain and limitation.  [] Met  [] Not Met  [x] Progressing     Patient will improve range of motion  to stated goals in order to return to maximal functional potential.  [] Met  [] Not Met  [x] Progressing     Patient will improve Strength to stated goals of appropriate musculature in order to improve functional independence.  [] Met  [] Not Met  [x] Progressing     Pain Rating at Best: 1/10 to improve Quality of Life.  [] Met  [] Not Met  [x] Progressing     Patient will meet predicted functional outcome (FOTO) score: 68% to increase self-worth & perceived functional ability. [] Met  [] Not Met  [x] Progressing     Patient will have met/partially met personal goal of: improve pain and function  [] Met  [] Not Met  [x] Progressing              PLAN   Plan of care Certification: 7/10/2024 to 9/8/2024     Outpatient Physical Therapy 2 times weekly for 10 weeks to include any combination of the following interventions: virtual visits, dry needling, modalities, electrical stimulation (IFC, Pre-Mod, Attended with Functional Dry Needling), Manual Therapy, Moist Heat/ Ice, Neuromuscular Re-ed, Patient Education, Self Care, Therapeutic Exercise, Functional Training, and Therapeutic Activites      Rodrigo Kelsey, PT

## 2024-08-21 ENCOUNTER — CLINICAL SUPPORT (OUTPATIENT)
Dept: REHABILITATION | Facility: HOSPITAL | Age: 59
End: 2024-08-21
Payer: COMMERCIAL

## 2024-08-21 DIAGNOSIS — M25.612 DECREASED ROM OF LEFT SHOULDER: Primary | ICD-10-CM

## 2024-08-21 DIAGNOSIS — R29.898 WEAKNESS OF LEFT SHOULDER: ICD-10-CM

## 2024-08-21 PROCEDURE — 97112 NEUROMUSCULAR REEDUCATION: CPT | Mod: PN

## 2024-08-21 PROCEDURE — 97110 THERAPEUTIC EXERCISES: CPT | Mod: PN

## 2024-08-21 NOTE — PROGRESS NOTES
[facility]   Physical Therapy Treatment Note      Name: Linette Schmidt  [facility] Number: [id number]    Therapy Diagnosis:   Encounter Diagnoses   Name Primary?    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  She was happy that she had no pain with shoulder raises    Response to previous treatment: good  Functional change:     Pain: 0/10  Location: Bilateral shoulder      Objective      SHOULDER   Range of Motion Right  Active     Passive Left  Active     Passive Goal   Forward Flexion (180º) 170   170   170º   Abduction (180º) 160   160   160º   Functional External Rotation (C7) WFL   WFL   C7   Functional Internal Rotation (T10)  WFL    WFL   T10     UE STRENGTH -   Upper Extremity  Strength Right    Goal   Shoulder Flexion []1  []2  []3  []4  [x]5                []+ []- 5/5 B   Shoulder Abduction []1  []2  []3  []4  [x]5                []+ []- 5/5 B   Shoulder IR []1  []2  []3  []4  [x]5                []+ []- 5/5 B   Shoulder ER []1  []2  []3  []4  [x]5                []+ []- 5/5 B   Elbow Flexion  []1  []2  []3  []4  [x]5                []+ []- 5/5 B   Elbow Extension []1  []2  []3  []4  [x]5                []+ []- 5/5 B      Upper Extremity  Strength Left    Goal   Shoulder Flexion []1  []2  []3  []4  [x]5                []+ []- 5/5 B   Shoulder Abduction  []1  []2  []3  []4  [x]5                []+ []- 5/5 B   Shoulder IR []1  []2  []3  []4  [x]5                []+ []- 5/5 B   Shoulder ER []1  []2  []3  []4  [x]5                []+ []- 5/5 B   Elbow Flexion  []1  []2  []3  []4  [x]5                []+ []- 5/5 B   Elbow Extension []1  []2  []3  []4  [x]5                []+ []- 5/5 B       Treatment     Linette received the treatments listed below:        CPT Intervention  Joint:   Focus Duration /Intensity      X NMR UBE  3' FWD/ 3' BWD    X TE Shoulder wall slides 3x10   X NMR Rows (Matrix) 25# 3x10   X NMR Shoulder IR Red Tubing 3x10   X  NMR Shoulder ER Red Tubing 3x10   X  NMR Standing cable shoulder extension 30# 3x10   X   Standing bicep curls 5# 3x10   X  TE Shoulder IR stretch with towel behind back 3x20 seconds    X  TE Supine shoulder flexion with wand  3x10 with hold 4#   X NMR  Open books 2x10 (B)   X NMR Serratus punch 5# 3x10   X TE Cross body stretch 3x20 seconds    X NMR Slide lying shoulder ER 3# 3x10    X NMR Prone T 2x10 1#   X  NMR Prone Y 2x10   X  TE Chest press 10# 3x10   X  TE Lateral raises  2# 2x10                                                       PLAN         CPT Codes available for Billing:   (00) minutes of Manual therapy (MT) to improve pain and ROM.  (15) minutes of Therapeutic Exercise (TE) to develop strength, endurance, range of motion, and flexibility.  (40) minutes of Neuromuscular Re-Education (NMR)  to improve: Balance, Coordination, Kinesthetic, Sense, Proprioception, and Posture.  (00) minutes of Therapeutic Activities (TA) to improve functional performance.  Unattended Electrical Stimulation (ES) for muscle performance or pain modulation.  Vasopneumatic Device Therapy () for management of swelling/edema. (42084)  BFR: Blood flow restriction applied during exercise  NP or (-): Not Performed     Patient Education and Home Exercises       Education provided:   - Home program instruction    Written Home Exercises Provided:  Patient instructed to cont prior HEP. Exercises were reviewed and Linette was able to demonstrate them prior to the end of the session.  iLnette demonstrated good  understanding of the education provided. See Electronic Medical Record under Patient Instructions for exercises provided during therapy sessions    Assessment     The patient has soreness.  She is performing upper extremity and postural strengthening.  She has AROM WFL.  She has increased strength and will benefit from further PT.      Linette Is progressing well towards her goals.   Patient prognosis is Good.     Patient will continue to benefit from skilled outpatient physical therapy to address the deficits listed in the problem list box on initial evaluation, provide pt/family education and to maximize pt's level of independence in the home and community environment.     Patient's spiritual, cultural and educational needs considered and pt agreeable to plan of care and goals.     Anticipated barriers to physical therapy: None    SHORT TERM GOALS:  4 weeks  Progress Date Met   Recent signs and systems trend is improving in order to progress towards Long term goals.  [x] Met  [] Not Met  [] Progressing   7/29/2024   Patient will be independent with Home Exercise Program  in order to further progress and return to maximal function. [x] Met  [] Not Met  [] Progressing   7/29/2024   Pain rating at Worst: 5 /10 in order to progress towards increased independence with activity. [x] Met  [] Not Met  [] Progressing   7/29/2024   Patient will be able to correct postural deviations in sitting and standing, to decrease pain and promote postural awareness for injury prevention.  [x] Met  [] Not Met  [] Progressing  7/29/2024   Patient will improve functional outcome (FOTO) score: by 5% to increase self-worth & perceived functional ability towards long term goals [x] Met  [] Not Met  [] Progressing  8/21/2024      LONG TERM GOALS: 10 weeks  Progress Date Met    Patient will return to normal activites of daily living, recreational, and work related activities with less pain and limitation.  [x] Met  [] Not Met  [] Progressing  8/21/2024   Patient will improve range of motion  to stated goals in order to return to maximal functional potential.  [x] Met  [] Not Met  [] Progressing  8/21/2024   Patient will improve Strength to stated goals of appropriate musculature in order to improve functional independence.  [x] Met  [] Not Met  [] Progressing  8/21/2024   Pain Rating at Best: 1/10 to improve Quality of Life.  [x] Met  [] Not Met  [] Progressing  8/21/2024   Patient will meet predicted functional outcome (FOTO) score: 68% to increase self-worth & perceived functional ability.  (56, 60, 79) [x] Met  [] Not Met  [] Progressing  8/21/2024   Patient will have met/partially met personal goal of: improve pain and function  8/21/2024 Met  [] Not Met  [] Progressing  8/21/2024            PLAN       Discharge Physical Therapy goals met    Rodrigo Kelsey, PT

## 2024-08-24 NOTE — PLAN OF CARE
[facility]   Physical Therapy Discharge Summary     Name: Linette Schmidt  [facility] Number: [id number]    Therapy Diagnosis:   Encounter Diagnoses   Name Primary?    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  She was happy that she had no pain with shoulder raises    Response to previous treatment: good  Functional change:     Pain: 0/10  Location: Bilateral shoulder      Objective      SHOULDER   Range of Motion Right  Active     Passive Left  Active     Passive Goal   Forward Flexion (180º) 170   170   170º   Abduction (180º) 160   160   160º   Functional External Rotation (C7) WFL   WFL   C7   Functional Internal Rotation (T10)  WFL    WFL   T10     UE STRENGTH -   Upper Extremity  Strength Right    Goal   Shoulder Flexion []1  []2  []3  []4  [x]5                []+ []- 5/5 B   Shoulder Abduction []1  []2  []3  []4  [x]5                []+ []- 5/5 B   Shoulder IR []1  []2  []3  []4  [x]5                []+ []- 5/5 B   Shoulder ER []1  []2  []3  []4  [x]5                []+ []- 5/5 B   Elbow Flexion  []1  []2  []3  []4  [x]5                []+ []- 5/5 B   Elbow Extension []1  []2  []3  []4  [x]5                []+ []- 5/5 B      Upper Extremity  Strength Left    Goal   Shoulder Flexion []1  []2  []3  []4  [x]5                []+ []- 5/5 B   Shoulder Abduction []1  []2  []3  []4  [x]5                []+ []- 5/5 B    Shoulder IR []1  []2  []3  []4  [x]5                []+ []- 5/5 B   Shoulder ER []1  []2  []3  []4  [x]5                []+ []- 5/5 B   Elbow Flexion  []1  []2  []3  []4  [x]5                []+ []- 5/5 B   Elbow Extension []1  []2  []3  []4  [x]5                []+ []- 5/5 B      Patient Education and Home Exercises       Education provided:   - Home program instruction    Assessment     The patient has soreness.  She is performing upper extremity and postural strengthening.  She has AROM WFL.  She demonstrates improved range of motion and strength.  She has increased strength and will benefit from further PT.      SHORT TERM GOALS:  4 weeks  Progress Date Met   Recent signs and systems trend is improving in order to progress towards Long term goals.  [x] Met  [] Not Met  [] Progressing   7/29/2024   Patient will be independent with Home Exercise Program  in order to further progress and return to maximal function. [x] Met  [] Not Met  [] Progressing   7/29/2024   Pain rating at Worst: 5 /10 in order to progress towards increased independence with activity. [x] Met  [] Not Met  [] Progressing   7/29/2024   Patient will be able to correct postural deviations in sitting and standing, to decrease pain and promote postural awareness for injury prevention.  [x] Met  [] Not Met  [] Progressing  7/29/2024   Patient will improve functional outcome (FOTO) score: by 5% to increase self-worth & perceived functional ability towards long term goals [x] Met  [] Not Met  [] Progressing  8/21/2024      LONG TERM GOALS: 10 weeks  Progress Date Met   Patient will return to normal activites of daily living, recreational, and work related activities with less pain and limitation.  [x] Met  [] Not Met  [] Progressing  8/21/2024   Patient will improve range of motion  to stated goals in order to return to maximal functional potential.  [x] Met  [] Not Met  [] Progressing  8/21/2024   Patient will improve Strength to stated goals of  appropriate musculature in order to improve functional independence.  [x] Met  [] Not Met  [] Progressing  8/21/2024   Pain Rating at Best: 1/10 to improve Quality of Life.  [x] Met  [] Not Met  [] Progressing  8/21/2024   Patient will meet predicted functional outcome (FOTO) score: 68% to increase self-worth & perceived functional ability.  (56, 60, 79) [x] Met  [] Not Met  [] Progressing  8/21/2024   Patient will have met/partially met personal goal of: improve pain and function  8/21/2024 Met  [] Not Met  [] Progressing  8/21/2024            PLAN       Discharge Physical Therapy goals met    Rodrigo Kelsey, PT

## 2024-12-05 ENCOUNTER — OFFICE VISIT (OUTPATIENT)
Dept: INTERNAL MEDICINE | Facility: CLINIC | Age: 59
End: 2024-12-05
Payer: COMMERCIAL

## 2024-12-05 ENCOUNTER — PATIENT MESSAGE (OUTPATIENT)
Dept: INTERNAL MEDICINE | Facility: CLINIC | Age: 59
End: 2024-12-05

## 2024-12-05 VITALS — HEIGHT: 66 IN | BODY MASS INDEX: 25.23 KG/M2 | WEIGHT: 157 LBS

## 2024-12-05 DIAGNOSIS — Z86.39 HISTORY OF OBESITY: Chronic | ICD-10-CM

## 2024-12-05 DIAGNOSIS — E66.3 OVERWEIGHT (BMI 25.0-29.9): Primary | Chronic | ICD-10-CM

## 2024-12-05 RX ORDER — TIRZEPATIDE 7.5 MG/.5ML
7.5 INJECTION, SOLUTION SUBCUTANEOUS
Qty: 2 ML | Refills: 1 | Status: SHIPPED | OUTPATIENT
Start: 2024-12-05

## 2024-12-05 NOTE — ASSESSMENT & PLAN NOTE
"Estimated body mass index is 25.34 kg/m² as calculated from the following:    Height as of this encounter: 5' 6" (1.676 m).    Weight as of this encounter: 71.2 kg (157 lb).   Wt Readings from Last 15 Encounters:   12/05/24 71.2 kg (157 lb)   08/15/24 71.7 kg (158 lb)   07/10/24 75.4 kg (166 lb 3.6 oz)   07/09/24 76 kg (167 lb 8.8 oz)   06/20/24 76.7 kg (169 lb 1.5 oz)   05/23/24 78.8 kg (173 lb 11.6 oz)   03/20/24 82.6 kg (182 lb 1.6 oz)      "

## 2024-12-05 NOTE — PROGRESS NOTES
Have you had a sleep study done before? n    Are you using PAP? n    Are you using an Oral appliance? n  you must bring it along and the tools  If so, who is your dentist.  n    Do you have Inspire implanted? (If yes, do not schedule until coordination with leadership) n    Do you live in an assisted living facility, group home, or nursing home? If yes, which one? n    Are you independent with daily living activities? y    Do you need assistance using the restroom? n    Do you have a history of bowel or urinary incontinence? If yes, do you need a bedside commode or bed pan? n    Do you walk with assisted devices, such as walker, cane, wheelchair or scooter? If yes, which one? n    Will you need any assistance walking into the Sleep Lab? n    Have you had any recent falls?If yes, when and details? n    Are you able to move independently in and out of bed? y    Are you able to sleep in a traditional bed? y    Are you on supplemental oxygen? n    Do you have or have a history of dementia, psychosis, Alzheimer's or schizophrenia?  If yes, which one? n    Do you need an ?  If yes, do you need in person or is video  is adequate? n    Do you have any medical conditions that we should be aware of? n    Would you like to be added to our wait list in case we have a cancellation?y      Is there any other information we should know that would help us provide you with the best care? n     Bed Time : 10  Wake Time:   5      Any medical changes that would require you to need assistance with walking or daily actives that happen between now and your schedule appointment, please call and let us know.    Advise pt to avoid napping the day of sleep study appointment, also mention to avoid caffeine after the noon hour on the day of the sleep study.  Pt should bring something comfortable to sleep in.       TELEMEDICINE VIRTUAL VIDEO VISIT  12/5/24  4:00 PM EST    Visit Type: Audiovisual    Patient's Location: Linette represents that they are located within the state [facility].    History of Present Illness    CHIEF COMPLAINT:  Linette presents today for follow-up on Zepbound medication.    WEIGHT MANAGEMENT:  She has been on Zepbound 5mg for approximately 9 months for weight management. Her peak weight was 232 lbs, which increased to about 180 lbs before starting Zepbound. Her lowest weight was around 140-145 lbs. In the last three weeks, she has been stretching the medication to 10 days instead of the prescribed 7 days due to running low. She is requesting to increase the dosage to complete her weight loss goal before discontinuing the medication.    CARDIAC:  She reports receiving results from a cardiac CT performed by her cardiologist. She has the paperwork available to provide to the physician.       Review of Systems   Constitutional:  Negative for activity change and unexpected weight change.   HENT:  Negative for hearing loss, rhinorrhea and trouble swallowing.    Eyes:  Negative for discharge and visual disturbance.   Respiratory:  Negative for chest tightness and wheezing.    Cardiovascular:  Negative for chest pain and palpitations.   Gastrointestinal:  Negative for blood in stool, constipation, diarrhea and vomiting.   Endocrine: Negative for polydipsia and polyuria.   Genitourinary:  Negative for difficulty urinating, dysuria, hematuria and menstrual problem.   Musculoskeletal:  Negative for arthralgias, joint swelling and neck pain.   Neurological:  Negative for weakness and headaches.   Psychiatric/Behavioral:  Negative for confusion and dysphoric mood.        Assessment & Plan    Z68.25 Body mass index [BMI] 25.0-25.9, adult  Z71.3 Dietary counseling and surveillance  E78.5 Hyperlipidemia, unspecified     Reviewed patient's current weight and BMI, noting patient is just barely in the overweight  "category (BMI 25.3)   Considered patient's weight loss progress on current Zepbound dose and desire to lose final 10 lbs   Will increase Zepbound dose from 5mg to 7.5mg to aid in achieving weight loss goal    ACTION ITEMS/LIFESTYLE:   Linette to continue daily weight monitoring and tracking average weight trends   Linette to maintain current weight management efforts, allowing occasional indulgences without extended periods of unrestricted eating    MEDICATIONS:   Increased Zepbound from 5mg to 7.5mg daily for weight management    FOLLOW UP:   Follow up in 7-9 weeks for reevaluation of Zepbound effectiveness   Option for virtual video visit available for next appointment   Linette to check for earlier appointment openings as schedule may change       1. Overweight (BMI 25.0-29.9)  Assessment & Plan:  Estimated body mass index is 25.34 kg/m² as calculated from the following:    Height as of this encounter: 5' 6" (1.676 m).    Weight as of this encounter: 71.2 kg (157 lb).   Wt Readings from Last 15 Encounters:   12/05/24 71.2 kg (157 lb)   08/15/24 71.7 kg (158 lb)   07/10/24 75.4 kg (166 lb 3.6 oz)   07/09/24 76 kg (167 lb 8.8 oz)   06/20/24 76.7 kg (169 lb 1.5 oz)   05/23/24 78.8 kg (173 lb 11.6 oz)   03/20/24 82.6 kg (182 lb 1.6 oz)        Orders:  -     tirzepatide, weight loss, (ZEPBOUND) 7.5 mg/0.5 mL PnIj; Inject 7.5 mg into the skin every 7 days.  Dispense: 2 mL; Refill: 1    2. History of obesity  Overview:  Peak weight = 230 lbs (BMI 37.1 kg/m²) in approximately 2017.  No history or family history of thyroid cancer or multiple endocrine neoplasia syndrome.     Orders:  -     tirzepatide, weight loss, (ZEPBOUND) 7.5 mg/0.5 mL PnIj; Inject 7.5 mg into the skin every 7 days.  Dispense: 2 mL; Refill: 1    No other significant complaints or concerns were reported.  Today's visit involved the intricate management of episodic problem(s) and the ongoing care for the patient's serious or complex condition(s) " "listed above, reflecting the inherent complexity of providing longitudinal, comprehensive evaluation and management as the central hub for the patient's primary care services.  Vitals:    12/05/24 1536   Weight: 71.2 kg (157 lb)   Height: 5' 6" (1.676 m)     PHYSICAL EXAM:  GENERAL APPEARANCE:  - Alert and grossly oriented.  - No apparent distress, breathing comfortably.     EYES:  - Sclera without icterus.     EARS, NOSE, AND THROAT:  - No visible abnormalities.     RESPIRATORY:  - No respiratory distress.  - No audible wheezing or cough.     PSYCHIATRIC:  - Mood and affect appropriate; behavior cooperative.  This note was generated with the assistance of ambient listening technology. Verbal consent was obtained by the patient and accompanying visitor(s) for the recording of patient appointment to facilitate this note. I attest to having reviewed and edited the generated note for accuracy, though some syntax or spelling errors may persist. Please contact the author of this note for any clarification.    I spent a total of 15 minutes today evaluating and managing this patient for this encounter.  This includes face to face time and non-face to face time preparing to see the patient (eg, review of tests), obtaining and/or reviewing separately obtained history, documenting clinical information in the electronic or other health record, independently interpreting results and communicating results to the patient/family/caregiver, or care coordinator.  This time was exclusive of any separately billable procedures for this patient and exclusive of time spent treating any other patient.    Documentation entered by me for this encounter may have been done in part using speech-recognition technology. Although I have made an effort to ensure accuracy, "sound like" errors may exist and should be interpreted in context.    Each patient to whom medical services are provided by telemedicine is: (1) informed of the relationship " between the physician and patient and the respective role of any other health care provider with respect to management of the patient; and (2) notified that he or she may decline to receive medical services by telemedicine and may withdraw from such care at any time.

## 2024-12-30 ENCOUNTER — OFFICE VISIT (OUTPATIENT)
Dept: OTOLARYNGOLOGY | Facility: CLINIC | Age: 59
End: 2024-12-30
Payer: COMMERCIAL

## 2024-12-30 VITALS — BODY MASS INDEX: 25.34 KG/M2 | HEIGHT: 66 IN

## 2024-12-30 DIAGNOSIS — H93.11 TINNITUS AURIUM, RIGHT: ICD-10-CM

## 2024-12-30 DIAGNOSIS — H61.21 IMPACTED CERUMEN OF RIGHT EAR: Primary | ICD-10-CM

## 2024-12-30 PROCEDURE — 69210 REMOVE IMPACTED EAR WAX UNI: CPT | Mod: 50,S$GLB,, | Performed by: PHYSICIAN ASSISTANT

## 2024-12-30 PROCEDURE — 99999 PR PBB SHADOW E&M-EST. PATIENT-LVL III: CPT | Mod: PBBFAC,,, | Performed by: PHYSICIAN ASSISTANT

## 2024-12-30 PROCEDURE — 99213 OFFICE O/P EST LOW 20 MIN: CPT | Mod: 25,S$GLB,, | Performed by: PHYSICIAN ASSISTANT

## 2024-12-30 NOTE — PROGRESS NOTES
"Subjective:   Patient ID: Linette Schmidt is a 59 y.o. female.    Chief Complaint: Ear Fullness (Bilateral ear fullness and tinnitus)    Patient is here to see me today for evaluation of a possible wax impaction in bilateral ears.  She has complaints of hearing loss in the affected ears, but denies pain or drainage.  This has been an issue in the past.  The patient has not been using any sort of ear drop to soften the wax. Also c/o ringing in right ear.       Review of patient's allergies indicates:  No Known Allergies        Review of Systems   Constitutional: Negative.  Negative for chills, fatigue, fever and unexpected weight change.   HENT:  Positive for ear discharge and tinnitus. Negative for congestion, dental problem, ear pain, facial swelling, hearing loss, nosebleeds, postnasal drip, rhinorrhea, sinus pressure, sneezing, sore throat, trouble swallowing and voice change.    Eyes: Negative.  Negative for redness, itching and visual disturbance.   Respiratory: Negative.  Negative for cough, choking, shortness of breath and wheezing.    Cardiovascular: Negative.  Negative for chest pain and palpitations.   Gastrointestinal: Negative.  Negative for abdominal pain.        No reflux.   Endocrine: Negative.    Genitourinary: Negative.    Musculoskeletal: Negative.  Negative for gait problem.   Skin: Negative.  Negative for rash.   Allergic/Immunologic: Negative.    Neurological: Negative.  Negative for dizziness, light-headedness and headaches.   Hematological: Negative.    Psychiatric/Behavioral: Negative.           Objective:   Ht 5' 6" (1.676 m)   BMI 25.34 kg/m²     Physical Exam  Constitutional:       General: She is not in acute distress.     Appearance: She is well-developed.   HENT:      Head: Normocephalic and atraumatic.      Right Ear: Tympanic membrane, ear canal and external ear normal. There is impacted cerumen.      Left Ear: Tympanic membrane, ear canal and external ear normal.      Nose: " Nose normal. No nasal deformity, septal deviation, mucosal edema or rhinorrhea.      Right Sinus: No maxillary sinus tenderness or frontal sinus tenderness.      Left Sinus: No maxillary sinus tenderness or frontal sinus tenderness.      Mouth/Throat:      Mouth: Mucous membranes are not pale and not dry.      Dentition: No dental caries.      Pharynx: Uvula midline. No oropharyngeal exudate or posterior oropharyngeal erythema.   Eyes:      General: Lids are normal. No scleral icterus.     Extraocular Movements:      Right eye: Normal extraocular motion and no nystagmus.      Left eye: Normal extraocular motion and no nystagmus.      Conjunctiva/sclera: Conjunctivae normal.      Right eye: Right conjunctiva is not injected. No chemosis.     Left eye: Left conjunctiva is not injected. No chemosis.     Pupils: Pupils are equal, round, and reactive to light.   Neck:      Thyroid: No thyroid mass or thyromegaly.      Trachea: Trachea and phonation normal. No tracheal tenderness or tracheal deviation.   Pulmonary:      Effort: Pulmonary effort is normal. No respiratory distress.      Breath sounds: No stridor.   Abdominal:      General: There is no distension.   Lymphadenopathy:      Head:      Right side of head: No submental, submandibular, preauricular, posterior auricular or occipital adenopathy.      Left side of head: No submental, submandibular, preauricular, posterior auricular or occipital adenopathy.      Cervical: No cervical adenopathy.   Skin:     General: Skin is warm and dry.      Findings: No erythema or rash.   Neurological:      Mental Status: She is alert and oriented to person, place, and time.      Cranial Nerves: No cranial nerve deficit.   Psychiatric:         Behavior: Behavior normal.              Assessment:     1. Impacted cerumen of right ear    2. Tinnitus aurium, right        Plan:     Impacted cerumen of right ear    Tinnitus aurium, right     Cerumen impaction:  Removed today without  difficulty.  I would recommend the use of a wax softening drop, either over the counter Debrox or mineral oil, on a weekly basis.  I also instructed the patient to avoid Qtips.       We also discussed that tinnitus is most often caused by a hearing loss, and that as the hair cells are damaged, either genetic or as a result of loud noise exposure, they then cause tinnitus.  Some patients find that restricting the salt or caffeine in their diet helps, and there is also an OTC supplement, lipflavinoids, that some people find to be effective though their benefit is not fully proven.  Tinnitus tends to be louder in times of stress and fatigue, and may decrease with time.  Sound machines may also be an effective masking technique if needed at night.

## 2024-12-31 ENCOUNTER — OFFICE VISIT (OUTPATIENT)
Dept: OPHTHALMOLOGY | Facility: CLINIC | Age: 59
End: 2024-12-31
Payer: COMMERCIAL

## 2024-12-31 DIAGNOSIS — H53.8 BLURRED VISION, BILATERAL: Primary | ICD-10-CM

## 2024-12-31 DIAGNOSIS — H52.4 MYOPIA WITH ASTIGMATISM AND PRESBYOPIA, BILATERAL: ICD-10-CM

## 2024-12-31 DIAGNOSIS — H52.13 MYOPIA WITH ASTIGMATISM AND PRESBYOPIA, BILATERAL: ICD-10-CM

## 2024-12-31 DIAGNOSIS — H52.203 MYOPIA WITH ASTIGMATISM AND PRESBYOPIA, BILATERAL: ICD-10-CM

## 2024-12-31 PROCEDURE — 99999 PR PBB SHADOW E&M-EST. PATIENT-LVL III: CPT | Mod: PBBFAC,,, | Performed by: OPTOMETRIST

## 2024-12-31 PROCEDURE — 92015 DETERMINE REFRACTIVE STATE: CPT | Mod: S$GLB,,, | Performed by: OPTOMETRIST

## 2024-12-31 PROCEDURE — 92014 COMPRE OPH EXAM EST PT 1/>: CPT | Mod: S$GLB,,, | Performed by: OPTOMETRIST

## 2024-12-31 NOTE — PROGRESS NOTES
HPI     Annual Exam            Comments: RTC 1 yr for undilated eye exam   Vision changes since last eye exam?: yes    Any eye pain today: no    Other ocular symptoms: no    Interested in contact lens fitting today? no                       Last edited by Nina Valdes on 12/31/2024  7:44 AM.            Assessment /Plan     For exam results, see Encounter Report.    Blurred vision, bilateral    Myopia with astigmatism and presbyopia, bilateral      Eyeglass Final Rx       Eyeglass Final Rx         Sphere Cylinder Axis Add    Right -1.50 +2.75 085 +2.50    Left -1.75 +2.25 100 +2.50      Expiration Date: 12/31/2025              Eyeglass Final Rx #2         Sphere Cylinder Axis Add    Right +0.50 +2.75 085     Left +0.25 +2.25 100       Type: SVL reading    Expiration Date: 12/31/2025                    RTC 1 yr for dilated eye exam and gOCT or PRN if any problems.   Discussed above and answered questions.

## 2025-01-08 ENCOUNTER — LAB VISIT (OUTPATIENT)
Dept: LAB | Facility: HOSPITAL | Age: 60
End: 2025-01-08
Attending: INTERNAL MEDICINE
Payer: COMMERCIAL

## 2025-01-08 DIAGNOSIS — E55.9 VITAMIN D INSUFFICIENCY: ICD-10-CM

## 2025-01-08 DIAGNOSIS — Z51.81 MEDICATION MONITORING ENCOUNTER: ICD-10-CM

## 2025-01-08 LAB
25(OH)D3+25(OH)D2 SERPL-MCNC: 43 NG/ML (ref 30–96)
ALBUMIN SERPL BCP-MCNC: 4.2 G/DL (ref 3.5–5.2)
ALP SERPL-CCNC: 58 U/L (ref 40–150)
ALT SERPL W/O P-5'-P-CCNC: 17 U/L (ref 10–44)
ANION GAP SERPL CALC-SCNC: 8 MMOL/L (ref 8–16)
AST SERPL-CCNC: 21 U/L (ref 10–40)
BILIRUB SERPL-MCNC: 0.6 MG/DL (ref 0.1–1)
BUN SERPL-MCNC: 15 MG/DL (ref 6–20)
CALCIUM SERPL-MCNC: 9.7 MG/DL (ref 8.7–10.5)
CHLORIDE SERPL-SCNC: 108 MMOL/L (ref 95–110)
CO2 SERPL-SCNC: 26 MMOL/L (ref 23–29)
CREAT SERPL-MCNC: 0.7 MG/DL (ref 0.5–1.4)
EST. GFR  (NO RACE VARIABLE): >60 ML/MIN/1.73 M^2
GLUCOSE SERPL-MCNC: 90 MG/DL (ref 70–110)
POTASSIUM SERPL-SCNC: 4.7 MMOL/L (ref 3.5–5.1)
PROT SERPL-MCNC: 6.6 G/DL (ref 6–8.4)
SODIUM SERPL-SCNC: 142 MMOL/L (ref 136–145)

## 2025-01-08 PROCEDURE — 82306 VITAMIN D 25 HYDROXY: CPT | Performed by: INTERNAL MEDICINE

## 2025-01-08 PROCEDURE — 36415 COLL VENOUS BLD VENIPUNCTURE: CPT | Performed by: INTERNAL MEDICINE

## 2025-01-08 PROCEDURE — 80053 COMPREHEN METABOLIC PANEL: CPT | Performed by: INTERNAL MEDICINE

## 2025-01-14 ENCOUNTER — PATIENT MESSAGE (OUTPATIENT)
Dept: ADMINISTRATIVE | Facility: OTHER | Age: 60
End: 2025-01-14
Payer: COMMERCIAL

## 2025-01-15 ENCOUNTER — OFFICE VISIT (OUTPATIENT)
Dept: RHEUMATOLOGY | Facility: CLINIC | Age: 60
End: 2025-01-15
Payer: COMMERCIAL

## 2025-01-15 VITALS
BODY MASS INDEX: 25.79 KG/M2 | HEART RATE: 81 BPM | HEIGHT: 66 IN | SYSTOLIC BLOOD PRESSURE: 121 MMHG | DIASTOLIC BLOOD PRESSURE: 83 MMHG | WEIGHT: 160.5 LBS

## 2025-01-15 DIAGNOSIS — Z87.442 HISTORY OF NEPHROLITHIASIS: ICD-10-CM

## 2025-01-15 DIAGNOSIS — Z51.81 MEDICATION MONITORING ENCOUNTER: ICD-10-CM

## 2025-01-15 DIAGNOSIS — M81.0 AGE-RELATED OSTEOPOROSIS WITHOUT CURRENT PATHOLOGICAL FRACTURE: Primary | ICD-10-CM

## 2025-01-15 DIAGNOSIS — E55.9 VITAMIN D INSUFFICIENCY: ICD-10-CM

## 2025-01-15 DIAGNOSIS — R09.89 PROMINENT ABDOMINAL AORTIC PULSATION: ICD-10-CM

## 2025-01-15 PROCEDURE — 3079F DIAST BP 80-89 MM HG: CPT | Mod: CPTII,S$GLB,, | Performed by: INTERNAL MEDICINE

## 2025-01-15 PROCEDURE — 4010F ACE/ARB THERAPY RXD/TAKEN: CPT | Mod: CPTII,S$GLB,, | Performed by: INTERNAL MEDICINE

## 2025-01-15 PROCEDURE — 3074F SYST BP LT 130 MM HG: CPT | Mod: CPTII,S$GLB,, | Performed by: INTERNAL MEDICINE

## 2025-01-15 PROCEDURE — G2211 COMPLEX E/M VISIT ADD ON: HCPCS | Mod: S$GLB,,, | Performed by: INTERNAL MEDICINE

## 2025-01-15 PROCEDURE — 99999 PR PBB SHADOW E&M-EST. PATIENT-LVL III: CPT | Mod: PBBFAC,,, | Performed by: INTERNAL MEDICINE

## 2025-01-15 PROCEDURE — 1159F MED LIST DOCD IN RCRD: CPT | Mod: CPTII,S$GLB,, | Performed by: INTERNAL MEDICINE

## 2025-01-15 PROCEDURE — 99214 OFFICE O/P EST MOD 30 MIN: CPT | Mod: S$GLB,,, | Performed by: INTERNAL MEDICINE

## 2025-01-15 PROCEDURE — 3008F BODY MASS INDEX DOCD: CPT | Mod: CPTII,S$GLB,, | Performed by: INTERNAL MEDICINE

## 2025-01-15 NOTE — PROGRESS NOTES
RHEUMATOLOGY OUTPATIENT CLINIC NOTE    1/15/2025    Attending Rheumatologist: Breezy Crump  Primary Care Provider/Physician Requesting Consultation: COY Epps MD   Chief Complaint/Reason For Consultation:  Osteoporosis      Subjective:     Linette Schmidt is a 59 y.o. White female with OP    Tolerating Tymlos (7/2024-) w/o side effects.  No acute complaints at present.  Denies fractures since last visit.  Not currently scheduled for invasive dental w/u.  Off Ca/vit D supp at present.    Review of Systems   Constitutional:  Negative for fever.   Eyes:  Negative for pain.   Respiratory:  Negative for shortness of breath.    Cardiovascular:  Negative for chest pain.   Genitourinary:  Negative for dysuria, hematuria and urgency.   Musculoskeletal:  Negative for back pain and joint pain.   Skin:  Negative for rash.   Neurological:  Negative for focal weakness.       Chronic comorbid conditions affecting medical decision making today:  Past Medical History:   Diagnosis Date    Asthma     Osteoporosis      Past Surgical History:   Procedure Laterality Date    FINGER FRACTURE SURGERY Left     5th finger    HYSTERECTOMY      PARTIAL HYSTERECTOMY       Family History   Problem Relation Name Age of Onset    Hypertension Mother      Hypertension Father      Hypertension Sister      Breast cancer Neg Hx      Colon cancer Neg Hx      Ovarian cancer Neg Hx       Social History     Tobacco Use   Smoking Status Never   Smokeless Tobacco Never       Current Outpatient Medications:     abaloparatide (TYMLOS) 80 mcg (3,120 mcg/1.56 mL) PnIj, Inject 0.04 mLs (80 mcg total) into the skin once daily., Disp: 1.56 mL, Rfl: 11    albuterol (PROVENTIL/VENTOLIN HFA) 90 mcg/actuation inhaler, Inhale 2 puffs into the lungs every 6 (six) hours as needed for Wheezing. Rescue, Disp: 20.1 g, Rfl: 3    aspirin (ECOTRIN) 81 MG EC tablet, Take 81 mg by mouth once daily., Disp: , Rfl:     inhalation spacing device, Use as directed  when taking inhaled medicine, Disp: 1 each, Rfl: 0    pravastatin (PRAVACHOL) 80 MG tablet, , Disp: , Rfl:     ramipriL (ALTACE) 5 MG capsule, Take 5 mg by mouth once daily., Disp: , Rfl:     tirzepatide, weight loss, (ZEPBOUND) 7.5 mg/0.5 mL PnIj, Inject 7.5 mg into the skin every 7 days., Disp: 2 mL, Rfl: 1    tretinoin microspheres (RETIN-A MICRO PUMP) 0.06 % GlwP, Apply 1 application  topically every evening., Disp: 50 g, Rfl: 5     Objective:     Vitals:    01/15/25 0747   BP: 121/83   Pulse: 81     Physical Exam   Eyes: Conjunctivae are normal.   Pulmonary/Chest: Effort normal. No respiratory distress.   Musculoskeletal:         General: No swelling or tenderness.   Skin: No rash noted.       Reviewed available old and all outside pertinent medical records available.    All lab results personally reviewed and interpreted by me.       ASSESSMENT / PLAN     1. Age-related osteoporosis without current pathological fracture  Failure to bisphosphonates.  Tolerating Tymlos (7/2024-) w/o side effects.  Plan to update DXA after 2 total years of bone anabolic rx, and follow with antiresorptive agent.  C/w adequate Ca/vit D dietary intake.  Values WNR off supplementation at present (while on Tymlos)          2. Medication monitoring encounter  Comprehensive Metabolic Panel      3. Vitamin D insufficiency  Vitamin D      4. Prominent abdominal aortic pulsation        5. History of nephrolithiasis  Avoid hypervitaminosis D              Visit today included increased complexity associated with the care of the episodic problem Age-related osteoporosis without current pathological fracture addressed and managing the longitudinal care of the patient due to the serious and/or complex managed problem(s) Medication monitoring encounter, History of nephrolithiasis.    Breezy Crump M.D.

## 2025-02-03 ENCOUNTER — OFFICE VISIT (OUTPATIENT)
Dept: INTERNAL MEDICINE | Facility: CLINIC | Age: 60
End: 2025-02-03
Payer: COMMERCIAL

## 2025-02-03 ENCOUNTER — TELEPHONE (OUTPATIENT)
Dept: INTERNAL MEDICINE | Facility: CLINIC | Age: 60
End: 2025-02-03
Payer: COMMERCIAL

## 2025-02-03 VITALS
DIASTOLIC BLOOD PRESSURE: 79 MMHG | WEIGHT: 153 LBS | BODY MASS INDEX: 24.69 KG/M2 | HEART RATE: 80 BPM | SYSTOLIC BLOOD PRESSURE: 118 MMHG

## 2025-02-03 DIAGNOSIS — E66.3 OVERWEIGHT (BMI 25.0-29.9): Primary | Chronic | ICD-10-CM

## 2025-02-03 DIAGNOSIS — Z86.39 HISTORY OF OBESITY: Chronic | ICD-10-CM

## 2025-02-03 PROCEDURE — 3078F DIAST BP <80 MM HG: CPT | Mod: CPTII,95,, | Performed by: NURSE PRACTITIONER

## 2025-02-03 PROCEDURE — 1160F RVW MEDS BY RX/DR IN RCRD: CPT | Mod: CPTII,95,, | Performed by: NURSE PRACTITIONER

## 2025-02-03 PROCEDURE — 98005 SYNCH AUDIO-VIDEO EST LOW 20: CPT | Mod: 95,,, | Performed by: NURSE PRACTITIONER

## 2025-02-03 PROCEDURE — 3074F SYST BP LT 130 MM HG: CPT | Mod: CPTII,95,, | Performed by: NURSE PRACTITIONER

## 2025-02-03 PROCEDURE — 1159F MED LIST DOCD IN RCRD: CPT | Mod: CPTII,95,, | Performed by: NURSE PRACTITIONER

## 2025-02-03 PROCEDURE — 4010F ACE/ARB THERAPY RXD/TAKEN: CPT | Mod: CPTII,95,, | Performed by: NURSE PRACTITIONER

## 2025-02-03 PROCEDURE — G2211 COMPLEX E/M VISIT ADD ON: HCPCS | Mod: 95,,, | Performed by: NURSE PRACTITIONER

## 2025-02-03 RX ORDER — TIRZEPATIDE 7.5 MG/.5ML
7.5 INJECTION, SOLUTION SUBCUTANEOUS
Qty: 2 ML | Refills: 1 | Status: SHIPPED | OUTPATIENT
Start: 2025-02-03

## 2025-02-03 NOTE — PROGRESS NOTES
Primary Care Telemedicine Note  The patient location is:  Patient Home   The chief complaint leading to consultation is: Obesity weight loss managament  Total time spent with patient: 13 minutes    Visit type: Virtual visit with synchronous audio and video  Each patient to whom he or she provides medical services by telemedicine is:  (1) informed of the relationship between the physician and patient and the respective role of any other health care provider with respect to management of the patient; and (2) notified that he or she may decline to receive medical services by telemedicine and may withdraw from such care at any time.    CC:  Obesity weight loss management  HPI:  History of Present Illness    CHIEF COMPLAINT:  Linette presents today for follow up of weight management with Tirzepatide (Zepbound)    WEIGHT MANAGEMENT:  She reports recent weight loss trend from 157 lbs in December to 154 lbs in January. She previously lost over 80 lbs through the [employer] program but experienced cyclical weight regain, initially triggered by holiday indulgences.    MEDICATIONS:  She has been on Tirzepatide 750mg for 4 months, following progressive increases from 250mg to 500mg. She denies side effects including nausea, vomiting, or tachycardia. She reports good appetite control and satisfaction with current dosage, not requiring further increase.    DIET AND EXERCISE:  She maintains a daily caloric intake of 2928-2345 calories, consisting of cheese yogurt, rice cakes with cream cheese and berries, salads, and primarily chicken with occasional fish or shrimp. She walks at least 3 times weekly for 30 minutes and performs sporadic heavy physical activities including pressure washing, bush trimming, and pool cleaning. Exercise is limited due to shoulder condition, for which she performs specific exercises. She maintains adequate hydration.    MUSCULOSKELETAL:  She has shoulder condition limiting exercise  capacity.      ROS:  General: -fever, -chills, -fatigue, -weight gain, +weight loss  Eyes: -vision changes, -redness, -discharge  ENT: -ear pain, -nasal congestion, -sore throat  Cardiovascular: -chest pain, -palpitations, -lower extremity edema  Respiratory: -cough, -shortness of breath  Gastrointestinal: -abdominal pain, -nausea, -vomiting, -diarrhea, -constipation, -blood in stool  Genitourinary: -dysuria, -hematuria, -frequency  Musculoskeletal: -joint pain, -muscle pain  Skin: -rash, -lesion  Neurological: -headache, -dizziness, -numbness, -tingling  Psychiatric: -anxiety, -depression, -sleep difficulty        Problem List Items Addressed This Visit       Overweight (BMI 25.0-29.9) - Primary (Chronic)    Relevant Medications    tirzepatide, weight loss, (ZEPBOUND) 7.5 mg/0.5 mL PnIj    History of obesity (Chronic)    Overview     Peak weight = 230 lbs (BMI 37.1 kg/m²) in approximately 2017.  No history or family history of thyroid cancer or multiple endocrine neoplasia syndrome.          Relevant Medications    tirzepatide, weight loss, (ZEPBOUND) 7.5 mg/0.5 mL PnIj     Other Visit Diagnoses       BMI 24.0-24.9, adult                The patient's Health Maintenance was reviewed and the following appears to be due at this time:  Health Maintenance Due   Topic Date Due    Hepatitis C Screening  Never done    Pneumococcal Vaccines (Age 50+) (2 of 2 - PCV) 06/22/2021       [unfilled]  Outpatient Medications Prior to Visit   Medication Sig Dispense Refill    abaloparatide (TYMLOS) 80 mcg (3,120 mcg/1.56 mL) PnIj Inject 0.04 mLs (80 mcg total) into the skin once daily. 1.56 mL 11    albuterol (PROVENTIL/VENTOLIN HFA) 90 mcg/actuation inhaler Inhale 2 puffs into the lungs every 6 (six) hours as needed for Wheezing. Rescue 20.1 g 3    aspirin (ECOTRIN) 81 MG EC tablet Take 81 mg by mouth once daily.      inhalation spacing device Use as directed when taking inhaled medicine 1 each 0    pravastatin (PRAVACHOL) 80 MG  tablet       ramipriL (ALTACE) 5 MG capsule Take 5 mg by mouth once daily.      tretinoin microspheres (RETIN-A MICRO PUMP) 0.06 % GlwP Apply 1 application  topically every evening. 50 g 5    tirzepatide, weight loss, (ZEPBOUND) 7.5 mg/0.5 mL PnIj Inject 7.5 mg into the skin every 7 days. 2 mL 1     No facility-administered medications prior to visit.      ROS   Physical Exam   @[email or web address]@  /79   Pulse 80   Wt 69.4 kg (153 lb)   BMI 24.69 kg/m²     Constitutional: The patient appears well-developed and well-nourished. No distress.   Psychiatric: The mood appears not anxious and the affect is not angry, not blunt, not labile and not inappropriate. Speech is not rapid and/or pressured, not delayed, not tangential and not slurred. The patient is not agitated, not aggressive, is not hyperactive, not slowed, not withdrawn, not actively hallucinating and not combative. Thought content is not paranoid and not delusional. Cognition and memory are not impaired. The patient does not express impulsivity or inappropriate judgment and does not exhibit a depressed mood. The patient expresses no homicidal and no suicidal ideation and has no suicidal plans and no homicidal plans. The patient is communicative and exhibits a normal recent memory and normal remote memory. The patient is attentive.     Encounter Diagnoses   Name Primary?    Overweight (BMI 25.0-29.9) Yes    History of obesity     BMI 24.0-24.9, adult        PLAN:    Assessment & Plan    Patient has been on Zepbound (tirzepatide) 750 mcg for 3-4 months, showing continued weight loss  Current weight 152.6 lbs, down from initial 187 lbs  Achieved healthy BMI 2 weeks ago; current goal weight is 140 lbs  Weight loss progress slower than expected but steady  Considering future dose reduction strategy (750 mcg to 500 mcg to 250 mcg) as patient approaches goal weight    SHOULDER CONDITION:  - Linette reports having a bad shoulder, which limits their exercise  routine.  - Instructed the patient to perform specific exercises for their shoulder condition.  - Advised the patient to continue lifting weights for arm strengthening.    WEIGHT MANAGEMENT:  - Continued Zepbound (tirzepatide) 750 mcg.  - Refilled Zepbound (tirzepatide), providing 1 refill.  - Linette is currently on Tirzepatide (Zepbound) for weight management, with a current dose of 750 mcg for the past 3-4 months.  - Noted that the patient's weight has decreased from 187 lbs to 152.6 lbs, reaching a healthy BMI 2 weeks ago.  - Linette reports controlled hunger and no side effects such as nausea, vomiting, or tachycardia.  - Observed that the patient's weight loss is progressing slowly but steadily.  - Acknowledged the patient's progress and discussed future plan to taper medication dosage.  - Recommend increasing physical activity, specifically walking.  - Maintained current dosage of Tirzepatide (Zepbound) at 750 mcg.  - Advised the patient to maintain a low calorie, low sodium, low carb diet and increase water intake.  - Discussed importance of daily walking for at least 30 minutes, 3 times per week.  - Linette to walk daily for at least 30 minutes, 3 times per week.  - Explained benefits of low calorie, low sodium, low carb diet.  - Linette to maintain low calorie, low sodium, low carb diet.  - Reviewed importance of adequate water intake.  - Recommend increasing water intake.    HYPERTENSION:  - Recorded the patient's blood pressure at 118/79 today.       I have refilled Linette Schmidt's ZEPBOUND. I am also having her maintain her aspirin, pravastatin, inhalation spacing device, RETIN-A MICRO PUMP, TYMLOS, albuterol, and ramipriL.  No problem-specific Assessment & Plan notes found for this encounter.      No follow-ups on file.  Follow up with Dr Epps in 2 months  Linette was seen today for medication problem.    Diagnoses and all orders for this visit:    Overweight (BMI 25.0-29.9)  -      tirzepatide, weight loss, (ZEPBOUND) 7.5 mg/0.5 mL PnIj; Inject 7.5 mg (one pen) into the skin every 7 days.    History of obesity  -     tirzepatide, weight loss, (ZEPBOUND) 7.5 mg/0.5 mL PnIj; Inject 7.5 mg (one pen) into the skin every 7 days.    BMI 24.0-24.9, adult      Medications Ordered This Encounter   Medications    tirzepatide, weight loss, (ZEPBOUND) 7.5 mg/0.5 mL PnIj     Sig: Inject 7.5 mg (one pen) into the skin every 7 days.     Dispense:  2 mL     Refill:  1     [unfilled]  No orders of the defined types were placed in this encounter.    Wt Readings from Last 20 Encounters:   02/03/25 69.4 kg (153 lb)   01/15/25 72.8 kg (160 lb 7.9 oz)   12/05/24 71.2 kg (157 lb)   08/15/24 71.7 kg (158 lb)   07/10/24 75.4 kg (166 lb 3.6 oz)   07/09/24 76 kg (167 lb 8.8 oz)   06/20/24 76.7 kg (169 lb 1.5 oz)   05/23/24 78.8 kg (173 lb 11.6 oz)   03/20/24 82.6 kg (182 lb 1.6 oz)   01/29/24 82.9 kg (182 lb 12.2 oz)   01/10/24 82.9 kg (182 lb 12.2 oz)   11/28/23 81.8 kg (180 lb 5.4 oz)   11/07/23 80.5 kg (177 lb 7.5 oz)   10/16/23 81.7 kg (180 lb 1.9 oz)   10/12/23 82.3 kg (181 lb 7 oz)   08/09/23 80 kg (176 lb 5.9 oz)   07/06/23 74.8 kg (165 lb)   07/03/23 76.1 kg (167 lb 12.3 oz)   05/29/23 78.2 kg (172 lb 6.4 oz)   03/21/23 74.8 kg (165 lb)         Medication List with Changes/Refills   Current Medications    ABALOPARATIDE (TYMLOS) 80 MCG (3,120 MCG/1.56 ML) PNIJ    Inject 0.04 mLs (80 mcg total) into the skin once daily.    ALBUTEROL (PROVENTIL/VENTOLIN HFA) 90 MCG/ACTUATION INHALER    Inhale 2 puffs into the lungs every 6 (six) hours as needed for Wheezing. Rescue    ASPIRIN (ECOTRIN) 81 MG EC TABLET    Take 81 mg by mouth once daily.    INHALATION SPACING DEVICE    Use as directed when taking inhaled medicine    PRAVASTATIN (PRAVACHOL) 80 MG TABLET        RAMIPRIL (ALTACE) 5 MG CAPSULE    Take 5 mg by mouth once daily.    TRETINOIN MICROSPHERES (RETIN-A MICRO PUMP) 0.06 % GLWP    Apply 1 application  topically  every evening.   Changed and/or Refilled Medications    Modified Medication Previous Medication    TIRZEPATIDE, WEIGHT LOSS, (ZEPBOUND) 7.5 MG/0.5 ML PNIJ tirzepatide, weight loss, (ZEPBOUND) 7.5 mg/0.5 mL PnIj       Inject 7.5 mg (one pen) into the skin every 7 days.    Inject 7.5 mg into the skin every 7 days.      Medication List with Changes/Refills   Current Medications    ABALOPARATIDE (TYMLOS) 80 MCG (3,120 MCG/1.56 ML) PNIJ    Inject 0.04 mLs (80 mcg total) into the skin once daily.       Start Date: 8/13/2024 End Date: 8/13/2025    ALBUTEROL (PROVENTIL/VENTOLIN HFA) 90 MCG/ACTUATION INHALER    Inhale 2 puffs into the lungs every 6 (six) hours as needed for Wheezing. Rescue       Start Date: 8/15/2024 End Date: --    ASPIRIN (ECOTRIN) 81 MG EC TABLET    Take 81 mg by mouth once daily.       Start Date: --        End Date: --    INHALATION SPACING DEVICE    Use as directed when taking inhaled medicine       Start Date: 10/16/2023End Date: --    PRAVASTATIN (PRAVACHOL) 80 MG TABLET           Start Date: 6/20/2020 End Date: --    RAMIPRIL (ALTACE) 5 MG CAPSULE    Take 5 mg by mouth once daily.       Start Date: --        End Date: --    TRETINOIN MICROSPHERES (RETIN-A MICRO PUMP) 0.06 % GLWP    Apply 1 application  topically every evening.       Start Date: 1/12/2024 End Date: --   Changed and/or Refilled Medications    Modified Medication Previous Medication    TIRZEPATIDE, WEIGHT LOSS, (ZEPBOUND) 7.5 MG/0.5 ML PNIJ tirzepatide, weight loss, (ZEPBOUND) 7.5 mg/0.5 mL PnIj       Inject 7.5 mg (one pen) into the skin every 7 days.    Inject 7.5 mg into the skin every 7 days.       Start Date: 2/3/2025  End Date: --    Start Date: 12/5/2024 End Date: 2/3/2025               Answers submitted by the patient for this visit:  Review of Systems Questionnaire (Submitted on 2/3/2025)  activity change: No  unexpected weight change: No  rhinorrhea: No  trouble swallowing: No  chest tightness: No  blood in stool:  No  difficulty urinating: No  menstrual problem: No  arthralgias: No  confusion: No  dysphoric mood: No

## 2025-02-07 ENCOUNTER — TELEPHONE (OUTPATIENT)
Dept: RHEUMATOLOGY | Facility: CLINIC | Age: 60
End: 2025-02-07
Payer: COMMERCIAL

## 2025-03-03 DIAGNOSIS — L98.8 RHYTIDES: ICD-10-CM

## 2025-03-04 RX ORDER — TRETINOIN 0.6 MG/G
1 GEL TOPICAL NIGHTLY
Qty: 50 G | Refills: 5 | Status: SHIPPED | OUTPATIENT
Start: 2025-03-04

## 2025-04-02 ENCOUNTER — OFFICE VISIT (OUTPATIENT)
Dept: INTERNAL MEDICINE | Facility: CLINIC | Age: 60
End: 2025-04-02
Payer: COMMERCIAL

## 2025-04-02 ENCOUNTER — TELEPHONE (OUTPATIENT)
Dept: INTERNAL MEDICINE | Facility: CLINIC | Age: 60
End: 2025-04-02
Payer: COMMERCIAL

## 2025-04-02 VITALS — HEIGHT: 66 IN | BODY MASS INDEX: 24.19 KG/M2 | WEIGHT: 150.5 LBS

## 2025-04-02 DIAGNOSIS — Z00.00 ROUTINE GENERAL MEDICAL EXAMINATION AT A HEALTH CARE FACILITY: Primary | ICD-10-CM

## 2025-04-02 DIAGNOSIS — I10 ESSENTIAL HYPERTENSION: Chronic | ICD-10-CM

## 2025-04-02 DIAGNOSIS — E78.2 MIXED HYPERLIPIDEMIA: Chronic | ICD-10-CM

## 2025-04-02 DIAGNOSIS — Z86.39 HISTORY OF OBESITY: Primary | Chronic | ICD-10-CM

## 2025-04-02 DIAGNOSIS — E66.3 OVERWEIGHT (BMI 25.0-29.9): Chronic | ICD-10-CM

## 2025-04-02 DIAGNOSIS — R04.0 EPISTAXIS: ICD-10-CM

## 2025-04-02 RX ORDER — TIRZEPATIDE 7.5 MG/.5ML
7.5 INJECTION, SOLUTION SUBCUTANEOUS
Qty: 2 ML | Refills: 2 | Status: SHIPPED | OUTPATIENT
Start: 2025-04-02

## 2025-04-02 NOTE — PROGRESS NOTES
TELEMEDICINE VIRTUAL VIDEO VISIT  4/2/25 11:00 AM CDT    Visit Type: Audiovisual    Patient's Location: Linette represents that they are located within the state [facility].    CHIEF COMPLAINT: Follow-up    History of obesity: Her previous peak weight was 230 lbs, but she has steadily lost weight, now weighing 150.5 lbs, with a BMI down to 24.29 kg/m². Her weight loss progress is slow but steady and is being monitored with the use of tirzepatide (Zepbound), which she tolerates well. She continues to administer the medication as prescribed, with plans to increasingly extend the dosing interval. Her goal weight is 140 lbs to maintain a normal range.    Essential hypertension: Her blood pressure readings remain stable, with recent home monitoring at an average of 132/83. She previously experienced low BP episodes, but these were resolved after reducing the dosage of ramipril to 5 mg. She continues on ramipril for management, indicating her hypertension is currently well-controlled.    Mixed hyperlipidemia: Her cholesterol and triglyceride levels are lower compared to previous years, suggesting improvement in lipid control, supported by pravastatin (Pravachol) which she continues at a stable dose. Regular follow-ups with her cardiologist, Dr. Marky Armendariz, help manage and assess her condition.    Epistaxis: She experienced a month-long episode of nosebleeds that has now resolved after discontinuing aspirin, which she had been using daily. Her history of nosebleeds is potentially linked to dry winter air. She was referred for an ENT evaluation to investigate this prolonged issue further.  1. History of obesity  Overview:  Peak weight = 230 lbs (BMI 37.1 kg/m²) in approximately 2017.  No history or family history of thyroid cancer or multiple endocrine neoplasia syndrome.     Orders:  -     tirzepatide, weight loss, (ZEPBOUND) 7.5 mg/0.5 mL PnIj; Inject 7.5 mg (one pen) into the skin every 7 days.  Dispense: 2 mL; Refill:  "2    2. Overweight (BMI 25.0-29.9)  -     tirzepatide, weight loss, (ZEPBOUND) 7.5 mg/0.5 mL PnIj; Inject 7.5 mg (one pen) into the skin every 7 days.  Dispense: 2 mL; Refill: 2    3. Essential hypertension  Overview:  Cardiologist: Dr. Marky Armendariz      4. Mixed hyperlipidemia  Overview:  CARDIOLOGIST: Dr. Marky Armendariz      5. Epistaxis  -     Ambulatory referral/consult to ENT; Future; Expected date: 04/09/2025       Unless specified otherwise, chronic conditions are represented as and appear to be compensated/controlled and stable.  Today's visit involved the intricate management of episodic problem(s) and the ongoing care for the patient's serious or complex condition(s) listed above, reflecting the inherent complexity of providing longitudinal, comprehensive evaluation and management as the central hub for the patient's primary care services.  Any education and instructions provided to the patient via Patient Portal Message today are incorporated herein by reference.  Vitals:    04/02/25 1100   Weight: 68.3 kg (150 lb 8 oz)   Height: 5' 6" (1.676 m)     PHYSICAL EXAM:  GENERAL APPEARANCE:  - Alert and grossly oriented.  - No apparent distress, breathing comfortably.     EYES:  - Sclera without icterus.     EARS, NOSE, AND THROAT:  - No visible abnormalities.     RESPIRATORY:  - No respiratory distress.  - No audible wheezing or cough.     PSYCHIATRIC:  - Mood and affect appropriate; behavior cooperative.  Review of Systems   Constitutional:  Negative for activity change and unexpected weight change.   HENT:  Negative for hearing loss, rhinorrhea and trouble swallowing.    Eyes:  Negative for discharge and visual disturbance.   Respiratory:  Negative for chest tightness and wheezing.    Cardiovascular:  Negative for chest pain and palpitations.   Gastrointestinal:  Negative for blood in stool, constipation, diarrhea and vomiting.   Endocrine: Negative for polydipsia and polyuria.   Genitourinary:  Negative for " difficulty urinating, dysuria, hematuria and menstrual problem.   Musculoskeletal:  Negative for arthralgias, joint swelling and neck pain.   Neurological:  Negative for weakness and headaches.   Psychiatric/Behavioral:  Negative for confusion and dysphoric mood.        I spent a total of 12 minutes today evaluating and managing this patient for this encounter.  This includes face to face time and non-face to face time preparing to see the patient (eg, review of tests), obtaining and/or reviewing separately obtained history, documenting clinical information in the electronic or other health record, independently interpreting results and communicating results to the patient/family/caregiver, or care coordinator.  This time was exclusive of any separately billable procedures for this patient and exclusive of time spent treating any other patient.    Some sections of this note may have been produced using ambient-listening and speech-recognition technologies. I have reviewed the content for accuracy, though minor errors in syntax, spelling, or similar-sounding words may be present and should be understood in context. Please contact the author for any clarification.  Each patient to whom medical services are provided by telemedicine is: (1) informed of the relationship between the physician and patient and the respective role of any other health care provider with respect to management of the patient; and (2) notified that he or she may decline to receive medical services by telemedicine and may withdraw from such care at any time.    Follow up in about 3 months (around 7/2/2025) for for Executive Health Exam.

## 2025-04-10 ENCOUNTER — OFFICE VISIT (OUTPATIENT)
Dept: OTOLARYNGOLOGY | Facility: CLINIC | Age: 60
End: 2025-04-10
Payer: COMMERCIAL

## 2025-04-10 VITALS — HEIGHT: 66 IN | BODY MASS INDEX: 24.77 KG/M2 | WEIGHT: 154.13 LBS

## 2025-04-10 DIAGNOSIS — J34.2 NASAL SEPTAL DEVIATION: Primary | ICD-10-CM

## 2025-04-10 DIAGNOSIS — R04.0 EPISTAXIS: ICD-10-CM

## 2025-04-10 PROCEDURE — 99999 PR PBB SHADOW E&M-EST. PATIENT-LVL III: CPT | Mod: PBBFAC,,, | Performed by: OTOLARYNGOLOGY

## 2025-04-10 PROCEDURE — 3008F BODY MASS INDEX DOCD: CPT | Mod: CPTII,S$GLB,, | Performed by: OTOLARYNGOLOGY

## 2025-04-10 PROCEDURE — 99213 OFFICE O/P EST LOW 20 MIN: CPT | Mod: S$GLB,,, | Performed by: OTOLARYNGOLOGY

## 2025-04-10 PROCEDURE — 1159F MED LIST DOCD IN RCRD: CPT | Mod: CPTII,S$GLB,, | Performed by: OTOLARYNGOLOGY

## 2025-04-10 PROCEDURE — 1160F RVW MEDS BY RX/DR IN RCRD: CPT | Mod: CPTII,S$GLB,, | Performed by: OTOLARYNGOLOGY

## 2025-04-10 PROCEDURE — 4010F ACE/ARB THERAPY RXD/TAKEN: CPT | Mod: CPTII,S$GLB,, | Performed by: OTOLARYNGOLOGY

## 2025-04-10 NOTE — PROGRESS NOTES
"Referring Provider:    COY Epps Md  [address] [facility]  [place]  [place] [address]  Subjective:   Patient: Linette Schmidt [id number], :1965   Visit date:4/10/2025 8:30 AM    Chief Complaint:  Epistaxis (Patient was seen at her pcp office a few weeks ago. Patient stated she has been having nosebleeds in L nostril for about 1 month. Pcp advised patient to follow up with ENT. Patient states she was taking a idalia Asprin everyday. She has stopped taking aspirin. The nosebleeds have subsided.)    HPI:    Prior notes reviewed by myself.  Clinical documentation obtained by nursing staff reviewed.      59-year-old female presents for evaluation of epistaxis.  She was having recurrent episodes of anterior epistaxis primarily from the left nostril that lasted for over a month.  She was taking an 81 mg aspirin which she discontinued and since that time her epistaxis has resolved.      Objective:     Physical Exam:  Vitals:  Ht 5' 6" (1.676 m)   Wt 69.9 kg (154 lb 1.6 oz)   BMI 24.87 kg/m²   General appearance:  Well developed, well nourished    Ears:  Otoscopy of external auditory canals and tympanic membranes was normal, clinical speech reception thresholds grossly intact, no mass/lesion of auricle.    Nose:  No masses/lesions of external nose, nasal mucosa, septum deviated 2+ left with healing scab inferiorly along anterior/mid septum, and turbinates were within normal limits.    Mouth:  No mass/lesion of lips, teeth, gums, hard/soft palate, tongue, tonsils, or oropharynx.    Neck & Lymphatics:  No cervical lymphadenopathy, no neck mass/crepitus/ asymmetry, trachea is midline, no thyroid enlargement/tenderness/mass.        [x]  Data Reviewed:    Lab Results   Component Value Date    WBC 7.53 08/15/2024    HGB 13.1 08/15/2024    HCT 37.4 08/15/2024    MCV 85 08/15/2024    EOSINOPHIL 2.8 2011             Assessment & Plan:   Nasal septal deviation    Epistaxis  -     Ambulatory referral/consult to " ENT        She has not had any bleeding for the last few weeks.  We reviewed options including cauterization and continued conservative management using nasal moisture.  She would like to pursue conservative management and will follow-up with us as needed    Nose Bleed Instructions:  We had a long discussion regarding the importance of nasal moisture, and the use of a nasal saline spray or gel into nose four times daily to keep moist.    Bactroban ointment in nostril twice daily if ordered.  Do not sleep or sit for long periods of time under a ceiling fan or other source of aggressive airflow.  Use a humidifier in bedroom or any room in your home you spend long periods of time.  Engage in only light activity. No strenuous activity. No heavy lifting or straining.   No bending over at the hips. Keep nose above your heart at all times.  Sneeze with an open mouth to reduce pressure from nose.   Avoid foods or drinks hot in temperature for at least 48 hours then progress slowly.  Avoid hot steamy showers or baths for one week.    Home Treatment of Epistaxis    The biggest mistake people make when treating a nosebleed is to lean their head back. Epistaxis, the medical term for nosebleeds, is a mild ailment that can often be treated at home. The first step in treating nosebleeds is to recognize the etiology. Nosebleeds can occur due to:    Age  High blood pressure  Daily aspirin or blood thinner  Bleeding disorder  Common cold  Allergies  Low humidity and dry weather  Dependence on oxygen treatment  Deviated septum  Smoking  Alcohol abuse  Kidney or liver disease  Drug use (sniffing)  Frequent nose picking  Trauma to nose  Prevention of nosebleeds would be controlling or managing the risks above (i.e., humidifier, quit smoking, saline nasal spray, etc.). If a nosebleed does occur, we recommend the following steps for at-home treatment.    Stay calm- an increase in heart rate can cause more bleeding  2.   Keep your head  above your heart- remain standing or sit-up  3.  Lean forward slightly - so the bleeding does not go down the back of your throat  4.  Pinch your nose at the lower portion (where your nostrils flare out) to provide pressure at the bleeding point - keep this pressure for at least 10 minutes  5.  If bleeding continues after step #4, gently blow the nose to remove clots and then spray 2-3 sprays of Afrin (pump spray mist, red and white box)  6.  Repeat step #4 - ok to repeat steps 5 and 6 up to 3 times    If your nosebleed continues after home treatment, the frequency of nosebleeds increases, nosebleed is caused by trauma to the nose, or occurs in a child under 2 years, then see an ENT doctor for further evaluation and treatment of the nosebleed.         Rolf Sapp M.D.  Department of Otolaryngology - Head & Neck Surgery  [address] [place] [address]  P: [phone]  F: [phone]        DISCLAIMER: This note was prepared with [employer] voice recognition transcription software. Garbled syntax, mangled pronouns, and other bizarre constructions may be attributed to that software system. While efforts were made to correct any mistakes made by this voice recognition program, some errors and/or omissions may remain in the note that were missed when the note was originally created.

## 2025-07-01 ENCOUNTER — CLINICAL SUPPORT (OUTPATIENT)
Dept: INTERNAL MEDICINE | Facility: CLINIC | Age: 60
End: 2025-07-01
Payer: COMMERCIAL

## 2025-07-01 ENCOUNTER — HOSPITAL ENCOUNTER (OUTPATIENT)
Dept: RADIOLOGY | Facility: HOSPITAL | Age: 60
Discharge: HOME | End: 2025-07-01
Attending: FAMILY MEDICINE
Payer: COMMERCIAL

## 2025-07-01 ENCOUNTER — OFFICE VISIT (OUTPATIENT)
Dept: INTERNAL MEDICINE | Facility: CLINIC | Age: 60
End: 2025-07-01
Payer: COMMERCIAL

## 2025-07-01 VITALS
SYSTOLIC BLOOD PRESSURE: 129 MMHG | BODY MASS INDEX: 24.11 KG/M2 | HEART RATE: 87 BPM | WEIGHT: 150 LBS | DIASTOLIC BLOOD PRESSURE: 84 MMHG | OXYGEN SATURATION: 100 % | TEMPERATURE: 97 F | HEIGHT: 66 IN

## 2025-07-01 DIAGNOSIS — M81.0 AGE-RELATED OSTEOPOROSIS WITHOUT CURRENT PATHOLOGICAL FRACTURE: ICD-10-CM

## 2025-07-01 DIAGNOSIS — Z11.59 ENCOUNTER FOR HEPATITIS C SCREENING TEST FOR LOW RISK PATIENT: Primary | ICD-10-CM

## 2025-07-01 DIAGNOSIS — Z23 NEED FOR VACCINATION: ICD-10-CM

## 2025-07-01 DIAGNOSIS — Z11.59 ENCOUNTER FOR HEPATITIS C SCREENING TEST FOR LOW RISK PATIENT: ICD-10-CM

## 2025-07-01 DIAGNOSIS — I65.23 CAROTID STENOSIS, ASYMPTOMATIC, BILATERAL: Chronic | ICD-10-CM

## 2025-07-01 DIAGNOSIS — Z00.00 ROUTINE GENERAL MEDICAL EXAMINATION AT A HEALTH CARE FACILITY: ICD-10-CM

## 2025-07-01 DIAGNOSIS — Z00.00 WELLNESS EXAMINATION: Primary | ICD-10-CM

## 2025-07-01 DIAGNOSIS — Z86.39 HISTORY OF OBESITY: Chronic | ICD-10-CM

## 2025-07-01 DIAGNOSIS — J45.20 MILD INTERMITTENT ASTHMA WITHOUT COMPLICATION: Chronic | ICD-10-CM

## 2025-07-01 LAB
ALBUMIN SERPL BCP-MCNC: 4.4 G/DL (ref 3.5–5.2)
ALP SERPL-CCNC: 67 UNIT/L (ref 40–150)
ALT SERPL W/O P-5'-P-CCNC: 21 UNIT/L (ref 10–44)
ANION GAP (OHS): 10 MMOL/L (ref 8–16)
AST SERPL-CCNC: 23 UNIT/L (ref 11–45)
BILIRUB SERPL-MCNC: 1.4 MG/DL (ref 0.1–1)
BUN SERPL-MCNC: 15 MG/DL (ref 6–20)
CALCIUM SERPL-MCNC: 9.5 MG/DL (ref 8.7–10.5)
CHLORIDE SERPL-SCNC: 107 MMOL/L (ref 95–110)
CHOLEST SERPL-MCNC: 128 MG/DL (ref 120–199)
CHOLEST/HDLC SERPL: 1.9 {RATIO} (ref 2–5)
CO2 SERPL-SCNC: 25 MMOL/L (ref 23–29)
CREAT SERPL-MCNC: 0.8 MG/DL (ref 0.5–1.4)
EAG (OHS): 97 MG/DL (ref 68–131)
ERYTHROCYTE [DISTWIDTH] IN BLOOD BY AUTOMATED COUNT: 12.6 % (ref 11.5–14.5)
GFR SERPLBLD CREATININE-BSD FMLA CKD-EPI: >60 ML/MIN/1.73/M2
GLUCOSE SERPL-MCNC: 82 MG/DL (ref 70–110)
HBA1C MFR BLD: 5 % (ref 4–5.6)
HCT VFR BLD AUTO: 38.7 % (ref 37–48.5)
HCV AB SERPL QL IA: NORMAL
HDLC SERPL-MCNC: 67 MG/DL (ref 40–75)
HDLC SERPL: 52.3 % (ref 20–50)
HGB BLD-MCNC: 13.4 GM/DL (ref 12–16)
HOLD SPECIMEN: NORMAL
LDLC SERPL CALC-MCNC: 50.6 MG/DL (ref 63–159)
MCH RBC QN AUTO: 29.5 PG (ref 27–31)
MCHC RBC AUTO-ENTMCNC: 34.6 G/DL (ref 32–36)
MCV RBC AUTO: 85 FL (ref 82–98)
NONHDLC SERPL-MCNC: 61 MG/DL
PLATELET # BLD AUTO: 188 K/UL (ref 150–450)
PMV BLD AUTO: 11.3 FL (ref 9.2–12.9)
POTASSIUM SERPL-SCNC: 4.8 MMOL/L (ref 3.5–5.1)
PROT SERPL-MCNC: 7.3 GM/DL (ref 6–8.4)
RBC # BLD AUTO: 4.54 M/UL (ref 4–5.4)
SODIUM SERPL-SCNC: 142 MMOL/L (ref 136–145)
TRIGL SERPL-MCNC: 52 MG/DL (ref 30–150)
TSH SERPL-ACNC: 1.78 UIU/ML (ref 0.4–4)
WBC # BLD AUTO: 4.41 K/UL (ref 3.9–12.7)

## 2025-07-01 PROCEDURE — 99999 PR PBB SHADOW E&M-EST. PATIENT-LVL IV: CPT | Mod: PBBFAC,,, | Performed by: FAMILY MEDICINE

## 2025-07-01 PROCEDURE — 3079F DIAST BP 80-89 MM HG: CPT | Mod: CPTII,S$GLB,, | Performed by: FAMILY MEDICINE

## 2025-07-01 PROCEDURE — 1159F MED LIST DOCD IN RCRD: CPT | Mod: CPTII,S$GLB,, | Performed by: FAMILY MEDICINE

## 2025-07-01 PROCEDURE — 3008F BODY MASS INDEX DOCD: CPT | Mod: CPTII,S$GLB,, | Performed by: FAMILY MEDICINE

## 2025-07-01 PROCEDURE — 85027 COMPLETE CBC AUTOMATED: CPT

## 2025-07-01 PROCEDURE — 75571 CT HRT W/O DYE W/CA TEST: CPT | Mod: TC

## 2025-07-01 PROCEDURE — 75571 CT HRT W/O DYE W/CA TEST: CPT | Mod: 26,,, | Performed by: RADIOLOGY

## 2025-07-01 PROCEDURE — 84443 ASSAY THYROID STIM HORMONE: CPT

## 2025-07-01 PROCEDURE — 1160F RVW MEDS BY RX/DR IN RCRD: CPT | Mod: CPTII,S$GLB,, | Performed by: FAMILY MEDICINE

## 2025-07-01 PROCEDURE — 3044F HG A1C LEVEL LT 7.0%: CPT | Mod: CPTII,S$GLB,, | Performed by: FAMILY MEDICINE

## 2025-07-01 PROCEDURE — 86803 HEPATITIS C AB TEST: CPT

## 2025-07-01 PROCEDURE — 3074F SYST BP LT 130 MM HG: CPT | Mod: CPTII,S$GLB,, | Performed by: FAMILY MEDICINE

## 2025-07-01 PROCEDURE — 80061 LIPID PANEL: CPT

## 2025-07-01 PROCEDURE — 4010F ACE/ARB THERAPY RXD/TAKEN: CPT | Mod: CPTII,S$GLB,, | Performed by: FAMILY MEDICINE

## 2025-07-01 PROCEDURE — 99396 PREV VISIT EST AGE 40-64: CPT | Mod: S$GLB,,, | Performed by: FAMILY MEDICINE

## 2025-07-01 PROCEDURE — 83036 HEMOGLOBIN GLYCOSYLATED A1C: CPT

## 2025-07-01 PROCEDURE — 80053 COMPREHEN METABOLIC PANEL: CPT

## 2025-07-01 PROCEDURE — 82306 VITAMIN D 25 HYDROXY: CPT

## 2025-07-01 RX ORDER — EZETIMIBE 10 MG/1
TABLET ORAL
COMMUNITY
Start: 2025-05-07

## 2025-07-01 RX ORDER — ALBUTEROL SULFATE 90 UG/1
2 INHALANT RESPIRATORY (INHALATION) EVERY 6 HOURS PRN
Qty: 20.1 G | Refills: 3 | Status: SHIPPED | OUTPATIENT
Start: 2025-07-01

## 2025-07-01 NOTE — ASSESSMENT & PLAN NOTE
"Wt Readings from Last 6 Encounters:   07/01/25 68 kg (150 lb)   04/10/25 69.9 kg (154 lb 1.6 oz)   04/02/25 68.3 kg (150 lb 8 oz)   02/03/25 69.4 kg (153 lb)   01/15/25 72.8 kg (160 lb 7.9 oz)   12/05/24 71.2 kg (157 lb)     Estimated body mass index is 24.21 kg/m² as calculated from the following:    Height as of this encounter: 5' 6" (1.676 m).    Weight as of this encounter: 68 kg (150 lb).    "

## 2025-07-01 NOTE — PROGRESS NOTES
"Critical access hospital ENCOUNTER  7/1/25      REASON FOR VISIT  [facility]    PCP (Primary Care Provider)  I am Linette's PCP.    HISTORY  Linette is here for her Executive Health Exam. She reports she is doing well.     She reports a 30-pound weight loss while on tirzepatide and is currently at approximately 150 lbs, which she describes as her "happy spot." She is self-weaning off tirzepatide due to loss of insurance coverage and expresses concerns about potential weight regain. She remains committed to weight maintenance through lifestyle management. Her weight has remained stable over the past several months, with readings of 68-72.8 kg across recent encounters. Her body mass index is currently 24.21 kg/m2. I discussed her progress in weight management and the nature of obesity as a metabolic problem, emphasizing ongoing effort. I discontinued tirzepatide as therapy was completed. I advised her to continue lifestyle measures and to schedule a virtual visit if weight gain occurs despite her efforts.    Carotid ultrasound from 2023 compared to 2025 demonstrates progression of stenosis, with the right carotid artery showing 40-59% stenosis and the left carotid artery demonstrating 60-79% stenosis by Doppler and 40-59% by area measurement, with tortuosity. She remains asymptomatic. Cardiovascular risk factors and management strategies were reviewed, including continued use of aspirin, pravastatin, ezetimibe, and ramipril. Recent cardiac PET stress test was normal, with LVEF of 74%. I advised her to continue minimizing cardiovascular risk factors.    She reports her asthma is well controlled with minimal albuterol use and denies frequent rescue inhaler requirements. I continued her albuterol inhaler as needed for wheezing and confirmed that she uses an inhalation spacing device. I reviewed her respiratory status and assessed her asthma as well controlled.    Except as noted herein, ROS is otherwise " "negative.    ASSESSMENT/PLAN  1. Wellness examination    2. History of obesity  Overview:  Peak weight = 230 lbs (BMI 37.1 kg/m²) in approximately 2017.  No history or family history of thyroid cancer or multiple endocrine neoplasia syndrome.     Assessment & Plan:  Wt Readings from Last 6 Encounters:   07/01/25 68 kg (150 lb)   04/10/25 69.9 kg (154 lb 1.6 oz)   04/02/25 68.3 kg (150 lb 8 oz)   02/03/25 69.4 kg (153 lb)   01/15/25 72.8 kg (160 lb 7.9 oz)   12/05/24 71.2 kg (157 lb)     Estimated body mass index is 24.21 kg/m² as calculated from the following:    Height as of this encounter: 5' 6" (1.676 m).    Weight as of this encounter: 68 kg (150 lb).        3. Carotid stenosis, asymptomatic, bilateral  Overview:  Cardiologist: Dr. Marky Armendariz  5/7/25 Cardotid US:  LEFT: 60-79% stenosis by Doppler, 40-59% stenosis by area, with tortuosity.  RIGHT: 40-59% stenosis by Doppler, ----% stenosis by area  7/9/24 Cardiac PET Stress Test: Normal, no ischemia, LVEF = 74%      4. Mild intermittent asthma without complication  -     albuterol (PROVENTIL/VENTOLIN HFA) 90 mcg/actuation inhaler; Inhale 2 puffs into the lungs every 6 (six) hours as needed for Wheezing. Rescue  Dispense: 20.1 g; Refill: 3    5. Need for vaccination  -     pneumoc 20-amanda conj-dip cr,PF, (PREVNAR-20, PF,) 0.5 mL Syrg injection; Inject 0.5 mLs into the muscle once. for 1 dose  Dispense: 0.5 mL; Refill: 0    6. Encounter for hepatitis C screening test for low risk patient  -     Hepatitis C Antibody; Future; Expected date: 07/01/2025        PHYSICAL EXAM  Vitals:    07/01/25 0841   BP: 129/84   Pulse: 87   Temp: 97.1 °F (36.2 °C)   SpO2: 100%   Weight: 68 kg (150 lb)   Height: 5' 6" (1.676 m)   Physical Exam  Vitals reviewed.   Constitutional:       General: She is not in acute distress.     Appearance: Normal appearance. She is not ill-appearing, toxic-appearing or diaphoretic.   HENT:      Head: Normocephalic and atraumatic.      Right Ear: Tympanic " membrane, ear canal and external ear normal.      Left Ear: Tympanic membrane, ear canal and external ear normal.   Eyes:      General: No scleral icterus.     Conjunctiva/sclera: Conjunctivae normal.   Neck:      Thyroid: No thyroid mass, thyromegaly or thyroid tenderness.      Vascular: No carotid bruit.   Cardiovascular:      Rate and Rhythm: Normal rate and regular rhythm.      Heart sounds: Normal heart sounds.   Pulmonary:      Effort: Pulmonary effort is normal.      Breath sounds: Normal breath sounds.   Abdominal:      General: Bowel sounds are normal. There is no distension.      Palpations: Abdomen is soft. There is no mass.      Tenderness: There is no abdominal tenderness.   Musculoskeletal:         General: No tenderness.      Cervical back: No muscular tenderness.   Lymphadenopathy:      Cervical: No cervical adenopathy.   Skin:     General: Skin is warm and dry.      Coloration: Skin is not jaundiced.   Neurological:      General: No focal deficit present.      Mental Status: She is alert and oriented to person, place, and time. Mental status is at baseline.      Cranial Nerves: No cranial nerve deficit.      Gait: Gait normal.   Psychiatric:         Mood and Affect: Mood normal.         Behavior: Behavior normal.         Judgment: Judgment normal.         MEDICATIONS  Linette has a current medication list which includes the following prescription(s): tymlos, aspirin, ezetimibe, inhalation spacing device, pravastatin, ramipril, retin-a micro pump, and albuterol.    HEALTH MAINTENANCE AND SCREENINGS - UP TO DATE  Health Maintenance Topics with due status: Not Due       Topic Last Completion Date    TETANUS VACCINE 08/24/2018    Colorectal Cancer Screening 03/22/2021    DEXA Scan 07/09/2024    Influenza Vaccine 08/29/2024    High Dose Statin 07/01/2025    Lipid Panel 07/01/2025    RSV Vaccine (Age 60+ and Pregnant patients) Not Due     HEALTH MAINTENANCE AND SCREENINGS - DUE OR DUE SOON  Health  Maintenance Due   Topic Date Due    Pneumococcal Vaccines (Age 50+) (2 of 2 - PCV) 06/22/2021    Mammogram  07/09/2025     FOLLOW-UP  Linette is to follow up with me for any health problems or concerns, any abnormal test results, and any age-appropriate health maintenance interventions and screenings that may be due.    PROBLEM LIST  Linette has Mild intermittent asthma without complication; Mixed hyperlipidemia; Essential hypertension; Lichen sclerosus; Age-related osteoporosis, improved to osteopenia with bisphosphinate therapy; Overweight (BMI 25.0-29.9); Prominent abdominal aortic pulsation; History of obesity; Decreased ROM of left shoulder; Weakness of left shoulder; and Carotid stenosis, asymptomatic, bilateral on their problem list.    PAST MEDICAL HISTORY  Lniette has a past medical history of Asthma and Osteoporosis.    SURGICAL HISTORY  Linette has a past surgical history that includes Finger fracture surgery (Left) and Hysterectomy.    FAMILY HISTORY  Linette family history includes Hypertension in her father, mother, and sister.     ALLERGIES  Linette reports she has no known allergies.    SOCIAL HISTORY  Linette  reports that she has never smoked. She has never used smokeless tobacco. She reports current alcohol use. She reports that she does not use drugs.     Documentation entered by me for this encounter may have been done in part using ambient-listening and speech-recognition technologies. I have reviewed the content for accuracy, though errors in syntax, spelling, or similar-sounding words may be present and should be interpreted in context. Please contact the author for any clarification.

## 2025-07-01 NOTE — LETTER
"Dear Linette,    It was a pleasure seeing you recently for your annual Executive Health Exam. I always appreciate your commitment to your health and wellbeing.    I am writing to provide you with a summary of your exam and test results, as well as to offer some guidance on next steps and ways to maintain your good health.    During your visit, we addressed several ongoing health topics, including your history of obesity, mild intermittent asthma, and asymptomatic carotid artery narrowing. We also reviewed your need for vaccinations and performed a screening for hepatitis C.    In addition to your annual exam, I ordered a hepatitis C antibody test to screen for this infection, as recommended for your age group.    Your vital signs at the visit were as follows: your blood pressure was 129/84, pulse 87, temperature 97.1°F, height 5'6", weight 68 kg (150 lbs), oxygen saturation 100%, and BMI 24.21. Healthy blood pressure is generally considered below 130/80, and your reading is in a healthy range. A normal BMI is between 18.5 and 24.9, and your BMI of 24.21 places you in the healthy weight category. Over the past year, your weight and BMI have steadily improved, moving from a higher range down to your current healthy level. This positive trend shows your hard work and commitment to your health--keep it up! Your blood pressure has also remained stable and healthy over your last several visits. If you notice any increases in weight or blood pressure in the future, I encourage you to continue making healthy lifestyle choices to maintain these gains.    Your tobacco use history is excellent--you have never smoked or used smokeless tobacco, which greatly reduces your risk for many chronic diseases. Your alcohol use is also in a healthy range, with moderate, social consumption and no episodes of binge drinking. These are both outstanding habits to maintain.    Reviewing other aspects of your social and lifestyle health, you " "report no financial strain, food insecurity, or housing instability, and you have reliable transportation. Your health literacy is excellent, and you are not at risk for depression. One area to continue working on is physical activity--you currently exercise two days per week for about 20 minutes per session. Increasing your activity, even by adding a short walk on more days of the week, can have significant benefits for your physical and mental health. You also reported experiencing a fair amount of stress. If you ever need support or resources to help manage stress, please let me know.    Your current medications include: Abaloparatide (Tymlos), Aspirin 81 mg, Ezetimibe (Zetia) 10 mg, Pravastatin (Pravachol) 80 mg, Ramipril (Altace) 5 mg, Tretinoin microspheres (Retin-A Micro Pump) 0.06%, and Albuterol (Proventil/Ventolin HFA) inhaler. Please continue taking these as prescribed and let me know if you have any questions or experience any side effects.    Regarding your lab results:    - Your Comprehensive Metabolic Panel (CMP) measures kidney and liver function, blood sugar, and electrolytes. All your results are within the normal range, except for a slightly elevated total bilirubin (1.4, with the upper limit being 1.0). This mild elevation is not clinically significant and is often seen in healthy individuals without any underlying disease. All other values are normal, which is very reassuring.    - Your Complete Blood Count (CBC) checks for anemia, infection, and overall blood health. All of your results are within the healthy range.    - Your Lipid Panel, which measures cholesterol and triglycerides, shows excellent results. Your total cholesterol, triglycerides, HDL (the "good" cholesterol), and non-HDL cholesterol are all in the healthy range. Your LDL cholesterol is a bit below the reference range, but this is not concerning, especially as you are on cholesterol-lowering medications. Your cholesterol/HDL " ratio is also low, which is a good sign.    - Your thyroid function (TSH) is normal, indicating healthy thyroid activity.    - Your hemoglobin A1c, which measures average blood sugar over the past three months, is 5.0%. This is well within the normal range and suggests excellent blood sugar control.    - Your vitamin D level is 46, which is in the healthy range.    - Your hepatitis C antibody test was negative, which means there is no evidence of hepatitis C infection.    Looking at your historical lab data, your cholesterol and blood sugar numbers have remained stable or improved over time, which is a great sign. Your kidney and liver function tests are also stable.    You also had a CT coronary calcium score, which is a specialized scan to assess for early signs of coronary artery disease. Your score was 0, which is the best possible result and indicates a very low risk of coronary heart disease at this time. This is excellent news.    Your health maintenance items are mostly up to date, including your colorectal cancer screening, DEXA scan for bone health, and influenza vaccine. Please remember that you are due for a pneumococcal vaccine and a mammogram. If you have already completed these elsewhere, please send me the documentation so I can update your record. If not, I encourage you to schedule these soon to stay on top of your preventive care.    To continue supporting your physical and mental health, I encourage you to stay active, eat a balanced diet rich in fruits and vegetables, maintain your healthy weight, and find time for relaxation and stress management. Even small changes, like adding an extra walk each week or practicing deep breathing, can make a big difference.    As your family physician, I am committed to ensuring you receive the best care and look forward to continuing our healthcare journey together. Thank you for allowing me and my team to care for you, and for placing your trust in [facility]  for your healthcare needs. Please feel free to reach out if any new concerns arise or if you have any questions about your exam results. I am here to support you in every way possible.    Warmest regards,     CORI Epps MD

## 2025-07-02 LAB — 25(OH)D3+25(OH)D2 SERPL-MCNC: 46 NG/ML (ref 30–96)

## 2025-07-08 ENCOUNTER — OFFICE VISIT (OUTPATIENT)
Dept: OTOLARYNGOLOGY | Facility: CLINIC | Age: 60
End: 2025-07-08
Payer: COMMERCIAL

## 2025-07-08 VITALS — HEIGHT: 66 IN | BODY MASS INDEX: 24.1 KG/M2 | WEIGHT: 149.94 LBS

## 2025-07-08 DIAGNOSIS — J34.2 NASAL SEPTAL DEVIATION: ICD-10-CM

## 2025-07-08 DIAGNOSIS — R04.0 EPISTAXIS: ICD-10-CM

## 2025-07-08 DIAGNOSIS — J34.89 NASAL VESTIBULITIS: Primary | ICD-10-CM

## 2025-07-08 PROCEDURE — 4010F ACE/ARB THERAPY RXD/TAKEN: CPT | Mod: CPTII,S$GLB,, | Performed by: OTOLARYNGOLOGY

## 2025-07-08 PROCEDURE — 3008F BODY MASS INDEX DOCD: CPT | Mod: CPTII,S$GLB,, | Performed by: OTOLARYNGOLOGY

## 2025-07-08 PROCEDURE — 99214 OFFICE O/P EST MOD 30 MIN: CPT | Mod: S$GLB,,, | Performed by: OTOLARYNGOLOGY

## 2025-07-08 PROCEDURE — 1159F MED LIST DOCD IN RCRD: CPT | Mod: CPTII,S$GLB,, | Performed by: OTOLARYNGOLOGY

## 2025-07-08 PROCEDURE — 1160F RVW MEDS BY RX/DR IN RCRD: CPT | Mod: CPTII,S$GLB,, | Performed by: OTOLARYNGOLOGY

## 2025-07-08 PROCEDURE — 99999 PR PBB SHADOW E&M-EST. PATIENT-LVL III: CPT | Mod: PBBFAC,,, | Performed by: OTOLARYNGOLOGY

## 2025-07-08 PROCEDURE — 3044F HG A1C LEVEL LT 7.0%: CPT | Mod: CPTII,S$GLB,, | Performed by: OTOLARYNGOLOGY

## 2025-07-08 RX ORDER — MUPIROCIN 20 MG/G
OINTMENT TOPICAL 2 TIMES DAILY
Qty: 15 G | Refills: 3 | Status: SHIPPED | OUTPATIENT
Start: 2025-07-08 | End: 2025-07-18

## 2025-07-08 NOTE — PROGRESS NOTES
"Referring Provider:    No referring provider defined for this encounter.  Subjective:   Patient: Linette Schmidt [id number], :1965   Visit date:2025 8:30 AM    Chief Complaint:  Epistaxis (Pt reports that her nose is still bleeding but not as bad as it was. From her pervious visit.)    HPI:    Prior notes reviewed by myself.  Clinical documentation obtained by nursing staff reviewed.      59-year-old female presents for evaluation of epistaxis.  She was having recurrent episodes of anterior epistaxis primarily from the left nostril that lasted for over a month.  She was taking an 81 mg aspirin which she discontinued and since that time her epistaxis has resolved.    25 update:   she continues to intermittently have bleeding from the left nostril.  She has been using saline and she was using Neosporin for a short period of time.      Objective:     Physical Exam:  Vitals:  Ht 5' 6" (1.676 m)   Wt 68 kg (149 lb 14.6 oz)   BMI 24.20 kg/m²   General appearance:  Well developed, well nourished    Ears:  Otoscopy of external auditory canals and tympanic membranes was normal, clinical speech reception thresholds grossly intact, no mass/lesion of auricle.    Nose:  No masses/lesions of external nose, nasal mucosa, septum deviated 2+ left with healing scab inferiorly along inferior anterior septum and nasal vestibule, and turbinates were within normal limits.    Mouth:  No mass/lesion of lips, teeth, gums, hard/soft palate, tongue, tonsils, or oropharynx.    Neck & Lymphatics:  No cervical lymphadenopathy, no neck mass/crepitus/ asymmetry, trachea is midline, no thyroid enlargement/tenderness/mass.        [x]  Data Reviewed:    Lab Results   Component Value Date    WBC 4.41 2025    HGB 13.4 2025    HCT 38.7 2025    MCV 85 2025    EOSINOPHIL 2.8 2011             Assessment & Plan:   Nasal vestibulitis  -     mupirocin (BACTROBAN) 2 % ointment; by Nasal route 2 (two) times " daily. Apply to nose twice daily for 10 days  Dispense: 15 g; Refill: 3    Epistaxis    Nasal septal deviation        She has not had any bleeding for the last few weeks.  We reviewed options including cauterization and continued conservative management using nasal moisture.  She would like to pursue conservative management and will follow-up with us as needed    7/8/25 update:    She has a scabbed/ ulcerated area in her nasal vestibule on the left side.  She does not have any prominent vessels on her nasal septum that would benefit from cauterization.  I recommended mupirocin b.I.d. for 10 days.  We discussed the option for oral antibiotics but agreed to hold off for now.      Nose Bleed Instructions:  We had a long discussion regarding the importance of nasal moisture, and the use of a nasal saline spray or gel into nose four times daily to keep moist.    Bactroban ointment in nostril twice daily if ordered.  Do not sleep or sit for long periods of time under a ceiling fan or other source of aggressive airflow.  Use a humidifier in bedroom or any room in your home you spend long periods of time.  Engage in only light activity. No strenuous activity. No heavy lifting or straining.   No bending over at the hips. Keep nose above your heart at all times.  Sneeze with an open mouth to reduce pressure from nose.   Avoid foods or drinks hot in temperature for at least 48 hours then progress slowly.  Avoid hot steamy showers or baths for one week.    Home Treatment of Epistaxis    The biggest mistake people make when treating a nosebleed is to lean their head back. Epistaxis, the medical term for nosebleeds, is a mild ailment that can often be treated at home. The first step in treating nosebleeds is to recognize the etiology. Nosebleeds can occur due to:    Age  High blood pressure  Daily aspirin or blood thinner  Bleeding disorder  Common cold  Allergies  Low humidity and dry weather  Dependence on oxygen  treatment  Deviated septum  Smoking  Alcohol abuse  Kidney or liver disease  Drug use (sniffing)  Frequent nose picking  Trauma to nose  Prevention of nosebleeds would be controlling or managing the risks above (i.e., humidifier, quit smoking, saline nasal spray, etc.). If a nosebleed does occur, we recommend the following steps for at-home treatment.    Stay calm- an increase in heart rate can cause more bleeding  2.   Keep your head above your heart- remain standing or sit-up  3.  Lean forward slightly - so the bleeding does not go down the back of your throat  4.  Pinch your nose at the lower portion (where your nostrils flare out) to provide pressure at the bleeding point - keep this pressure for at least 10 minutes  5.  If bleeding continues after step #4, gently blow the nose to remove clots and then spray 2-3 sprays of Afrin (pump spray mist, red and white box)  6.  Repeat step #4 - ok to repeat steps 5 and 6 up to 3 times    If your nosebleed continues after home treatment, the frequency of nosebleeds increases, nosebleed is caused by trauma to the nose, or occurs in a child under 2 years, then see an ENT doctor for further evaluation and treatment of the nosebleed.         Rolf Sapp M.D.  Department of Otolaryngology - Head & Neck Surgery  [address] [facility].  [place] [address]  P: [phone]  F: [phone]        DISCLAIMER: This note was prepared with [employer] voice recognition transcription software. Garbled syntax, mangled pronouns, and other bizarre constructions may be attributed to that software system. While efforts were made to correct any mistakes made by this voice recognition program, some errors and/or omissions may remain in the note that were missed when the note was originally created.

## 2025-07-11 ENCOUNTER — PATIENT MESSAGE (OUTPATIENT)
Dept: INTERNAL MEDICINE | Facility: CLINIC | Age: 60
End: 2025-07-11
Payer: COMMERCIAL

## 2025-07-15 DIAGNOSIS — M81.0 AGE-RELATED OSTEOPOROSIS WITHOUT CURRENT PATHOLOGICAL FRACTURE: ICD-10-CM

## 2025-07-16 RX ORDER — ABALOPARATIDE 2000 UG/ML
80 INJECTION, SOLUTION SUBCUTANEOUS DAILY
Qty: 1.56 ML | Refills: 11 | Status: SHIPPED | OUTPATIENT
Start: 2025-07-16 | End: 2026-07-16

## 2025-07-23 ENCOUNTER — OFFICE VISIT (OUTPATIENT)
Dept: INTERNAL MEDICINE | Facility: CLINIC | Age: 60
End: 2025-07-23
Payer: COMMERCIAL

## 2025-07-23 VITALS — WEIGHT: 155.81 LBS | BODY MASS INDEX: 25.04 KG/M2 | HEIGHT: 66 IN

## 2025-07-23 DIAGNOSIS — E66.3 OVERWEIGHT (BMI 25.0-29.9): Primary | Chronic | ICD-10-CM

## 2025-07-23 DIAGNOSIS — Z86.39 HISTORY OF OBESITY: Chronic | ICD-10-CM

## 2025-07-23 PROCEDURE — G2211 COMPLEX E/M VISIT ADD ON: HCPCS | Mod: 95,,, | Performed by: FAMILY MEDICINE

## 2025-07-23 PROCEDURE — 3044F HG A1C LEVEL LT 7.0%: CPT | Mod: CPTII,95,, | Performed by: FAMILY MEDICINE

## 2025-07-23 PROCEDURE — 98005 SYNCH AUDIO-VIDEO EST LOW 20: CPT | Mod: 95,,, | Performed by: FAMILY MEDICINE

## 2025-07-23 PROCEDURE — 1160F RVW MEDS BY RX/DR IN RCRD: CPT | Mod: CPTII,95,, | Performed by: FAMILY MEDICINE

## 2025-07-23 PROCEDURE — 4010F ACE/ARB THERAPY RXD/TAKEN: CPT | Mod: CPTII,95,, | Performed by: FAMILY MEDICINE

## 2025-07-23 PROCEDURE — 1159F MED LIST DOCD IN RCRD: CPT | Mod: CPTII,95,, | Performed by: FAMILY MEDICINE

## 2025-07-23 RX ORDER — SEMAGLUTIDE 1 MG/.5ML
1 INJECTION, SOLUTION SUBCUTANEOUS
Qty: 2 ML | Refills: 2 | Status: SHIPPED | OUTPATIENT
Start: 2025-07-23

## 2025-07-23 NOTE — PROGRESS NOTES
"TELEMEDICINE VIRTUAL VIDEO VISIT  7/23/25 11:40 AM EDT    Visit Type: Audiovisual    Patient's Location: Linette represents that they are located within the state of [place].    CHIEF COMPLAINT: Follow-up    She presented for follow-up of weight management, reporting a 5.8 lb weight gain since the beginning of July, now weighing 155.8 lbs at 5'6" (BMI 25.15 kg/m2). She described maintaining similar eating habits but experiencing weight gain after discontinuing tirzepatide on July 2nd, which she had previously taken at 7.5 mg every week and a half with good weight maintenance. She has prior experience with Ozempic without adverse effects. She expressed discouragement with her recent weight gain and remains motivated to continue weight loss efforts. I reviewed her weight trend, which showed a jackson of 149-150 lbs in early July and a rise to her current weight. Her labs from 7/1/2025 demonstrated normal metabolic, hematologic, and lipid parameters, with HbA1c 5.0%, glucose 82 mg/dL, TSH 1.778, and LDL 50.6. She has a history of obesity, with a peak weight of 230 lbs (BMI 37.1) in 2017, and no history or family history of thyroid cancer or multiple endocrine neoplasia syndrome. She reported no ongoing concerns from her resolved spider bite or nosebleed. I discussed that weight regain is common after stopping GLP-1 agonists due to underlying metabolic conditions, and that insurance no longer covers tirzepatide but does cover semaglutide. I explained the switch to semaglutide 1 mg weekly as a reasonable alternative, reviewed potential risks of slower initial weight loss and GI side effects, and provided detailed instructions for starting the new medication, including dietary measures, hydration, and bowel regimen. She received education on the persistence of the underlying metabolic condition and the role of GLP-1 agonists, and I outlined plans for ongoing weight management and close follow-up.      1. Overweight (BMI " "25.0-29.9)  -     semaglutide, weight loss, (WEGOVY) 1 mg/0.5 mL PnIj; Inject 1 mg into the skin every 7 days.  Dispense: 2 mL; Refill: 2    2. History of obesity  Overview:  Peak weight = 230 lbs (BMI 37.1 kg/m²) in approximately 2017.  No history or family history of thyroid cancer or multiple endocrine neoplasia syndrome.     Orders:  -     semaglutide, weight loss, (WEGOVY) 1 mg/0.5 mL PnIj; Inject 1 mg into the skin every 7 days.  Dispense: 2 mL; Refill: 2       Unless specified otherwise, chronic conditions are represented as and appear to be compensated/controlled and stable.  Today's visit involved the intricate management of episodic problem(s) and the ongoing care for the patient's serious or complex condition(s) listed above, reflecting the inherent complexity of providing longitudinal, comprehensive evaluation and management as the central hub for the patient's primary care services.  Vitals:    07/23/25 1126   Weight: 70.7 kg (155 lb 12.8 oz)   Height: 5' 6" (1.676 m)     PHYSICAL EXAM:  GENERAL APPEARANCE:  - Alert and grossly oriented.  - No apparent distress, breathing comfortably.     EYES:  - Sclera without icterus.     EARS, NOSE, AND THROAT:  - No visible abnormalities.     RESPIRATORY:  - No respiratory distress.  - No audible wheezing or cough.     PSYCHIATRIC:  - Mood and affect appropriate; behavior cooperative.  Review of Systems   Constitutional:  Positive for unexpected weight change. Negative for activity change.   HENT:  Negative for hearing loss, rhinorrhea and trouble swallowing.    Eyes:  Negative for discharge and visual disturbance.   Respiratory:  Negative for chest tightness and wheezing.    Cardiovascular:  Negative for chest pain and palpitations.   Gastrointestinal:  Negative for blood in stool, constipation, diarrhea and vomiting.   Endocrine: Negative for polydipsia and polyuria.   Genitourinary:  Negative for difficulty urinating, dysuria, hematuria and menstrual problem. "   Musculoskeletal:  Negative for arthralgias, joint swelling and neck pain.   Neurological:  Negative for weakness and headaches.   Psychiatric/Behavioral:  Negative for confusion and dysphoric mood.      I spent a total of 12 minutes today evaluating and managing this patient for this encounter.  This includes face to face time and non-face to face time preparing to see the patient (eg, review of tests), obtaining and/or reviewing separately obtained history, documenting clinical information in the electronic or other health record, independently interpreting results and communicating results to the patient/family/caregiver, or care coordinator.  This time was exclusive of any separately billable procedures for this patient and exclusive of time spent treating any other patient.    Documentation entered by me for this encounter may have been done in part using ambient-listening and speech-recognition technologies. I have reviewed the content for accuracy, though errors in syntax, spelling, or similar-sounding words may be present and should be interpreted in context. Please contact the author for any clarification.     Each patient to whom medical services are provided by telemedicine is: (1) informed of the relationship between the physician and patient and the respective role of any other health care provider with respect to management of the patient; and (2) notified that he or she may decline to receive medical services by telemedicine and may withdraw from such care at any time.    Follow up in about 12 weeks (around 10/15/2025) for virtual visit.  Future Appointments   Date Time Provider Department Center   8/1/2025  7:20 AM ON MAMMO2 ON MAMMO [facility]   8/6/2025  8:15 AM Jania Bar MD ON OBGYN  [facility]   8/7/2025  1:45 PM Rolf Sapp MD [facility]LC ENT  [facility]   10/9/2025 10:45 AM Gardenia Betancourt MD [facility]ER[facility]   1/8/2026 11:00 AM LABORATORY, [facility] ON LAB [facility]   1/15/2026  8:30 AM  Breezy Crump MD [facility]

## 2025-08-01 ENCOUNTER — HOSPITAL ENCOUNTER (OUTPATIENT)
Dept: RADIOLOGY | Facility: HOSPITAL | Age: 60
Discharge: HOME | End: 2025-08-01
Attending: FAMILY MEDICINE
Payer: COMMERCIAL

## 2025-08-01 DIAGNOSIS — Z12.31 ENCOUNTER FOR SCREENING MAMMOGRAM FOR BREAST CANCER: ICD-10-CM

## 2025-08-01 PROCEDURE — 77067 SCR MAMMO BI INCL CAD: CPT | Mod: 26,,, | Performed by: RADIOLOGY

## 2025-08-01 PROCEDURE — 77067 SCR MAMMO BI INCL CAD: CPT | Mod: TC

## 2025-08-01 PROCEDURE — 77063 BREAST TOMOSYNTHESIS BI: CPT | Mod: 26,,, | Performed by: RADIOLOGY

## 2025-08-06 ENCOUNTER — OFFICE VISIT (OUTPATIENT)
Dept: OBSTETRICS AND GYNECOLOGY | Facility: CLINIC | Age: 60
End: 2025-08-06
Payer: COMMERCIAL

## 2025-08-06 VITALS
BODY MASS INDEX: 25.44 KG/M2 | DIASTOLIC BLOOD PRESSURE: 64 MMHG | HEIGHT: 66 IN | WEIGHT: 158.31 LBS | SYSTOLIC BLOOD PRESSURE: 108 MMHG

## 2025-08-06 DIAGNOSIS — Z01.419 WELL WOMAN EXAM WITH ROUTINE GYNECOLOGICAL EXAM: Primary | ICD-10-CM

## 2025-08-06 DIAGNOSIS — L90.0 LICHEN SCLEROSUS: ICD-10-CM

## 2025-08-06 PROCEDURE — 3074F SYST BP LT 130 MM HG: CPT | Mod: CPTII,S$GLB,, | Performed by: OBSTETRICS & GYNECOLOGY

## 2025-08-06 PROCEDURE — 4010F ACE/ARB THERAPY RXD/TAKEN: CPT | Mod: CPTII,S$GLB,, | Performed by: OBSTETRICS & GYNECOLOGY

## 2025-08-06 PROCEDURE — 99999 PR PBB SHADOW E&M-EST. PATIENT-LVL III: CPT | Mod: PBBFAC,,, | Performed by: OBSTETRICS & GYNECOLOGY

## 2025-08-06 PROCEDURE — 3044F HG A1C LEVEL LT 7.0%: CPT | Mod: CPTII,S$GLB,, | Performed by: OBSTETRICS & GYNECOLOGY

## 2025-08-06 PROCEDURE — 3008F BODY MASS INDEX DOCD: CPT | Mod: CPTII,S$GLB,, | Performed by: OBSTETRICS & GYNECOLOGY

## 2025-08-06 PROCEDURE — 1159F MED LIST DOCD IN RCRD: CPT | Mod: CPTII,S$GLB,, | Performed by: OBSTETRICS & GYNECOLOGY

## 2025-08-06 PROCEDURE — 99396 PREV VISIT EST AGE 40-64: CPT | Mod: S$GLB,,, | Performed by: OBSTETRICS & GYNECOLOGY

## 2025-08-06 PROCEDURE — 3078F DIAST BP <80 MM HG: CPT | Mod: CPTII,S$GLB,, | Performed by: OBSTETRICS & GYNECOLOGY

## 2025-08-06 NOTE — PROGRESS NOTES
Subjective     Patient ID: Linette Schmidt is a 60 y.o. female.    Chief Complaint:  Well Woman      History of Present Illness  HPI  Presents for well-woman exam.  Has hx of hysterectomy for benign indications.  Hx of lichen sclerosis that is essentially non-bothersome.  Has some dryness during intercourse that they manage with OTC lubricant.  Also has a vulvar sebaceous cyst that seems stable in size and non-bothersome.  Pt is retired.  [place].  No hx of cervical dysplasia  MM2025: normal  DXA: 24: osteopenia  Colonoscopy: : neg    GYN & OB History  No LMP recorded. Patient has had a hysterectomy.   Date of Last Pap: No result found    OB History    Para Term  AB Living   2 2 2   2   SAB IAB Ectopic Multiple Live Births       2      # Outcome Date GA Lbr Dylan/2nd Weight Sex Type Anes PTL Lv   2 Term 94    M Vag-Spont   JING   1 Term 89    M Vag-Spont   JNIG       Review of Systems  Review of Systems       Objective   Physical Exam:   Constitutional: She is oriented to person, place, and time. She appears well-developed and well-nourished. No distress.      Neck: No thyromegaly present.     Pulmonary/Chest: Right breast exhibits no inverted nipple, no mass, no nipple discharge, no skin change, no tenderness, no bleeding and no swelling. Left breast exhibits no inverted nipple, no mass, no nipple discharge, no skin change, no tenderness, no bleeding and no swelling. Breasts are symmetrical.        Abdominal: Soft. She exhibits no distension and no mass. There is no abdominal tenderness. There is no rebound and no guarding. Hernia confirmed negative in the right inguinal area and confirmed negative in the left inguinal area.     Genitourinary:    Vagina normal.            Pelvic exam was performed with patient supine.   There is no rash, tenderness, lesion or injury on the right labia. There is no rash, tenderness, lesion or injury on the left  labia. Right adnexum displays no mass, no tenderness and no fullness. Left adnexum displays no mass, no tenderness and no fullness. No erythema, vaginal discharge, tenderness, bleeding, rectocele, cystocele or prolapse of vaginal walls in the vagina.    No foreign body in the vagina.      No signs of injury in the vagina.   Cervix is absent.Uterus is absent.               Neurological: She is alert and oriented to person, place, and time.     Psychiatric: She has a normal mood and affect.            Assessment and Plan     1. Well woman exam with routine gynecological exam    2. Lichen sclerosus             Plan:  Linette was seen today for well woman.    Diagnoses and all orders for this visit:    Well woman exam with routine gynecological exam    Lichen sclerosus     Reviewed updated recommendations for pap smears (no pap smear needed) in patients with a hysterectomy for benign indications.   Patient needs a pelvic exam every year.  Reviewed recommendations for annual CBE and annual MMG.  RTC 1 year

## 2025-08-07 ENCOUNTER — OFFICE VISIT (OUTPATIENT)
Dept: OTOLARYNGOLOGY | Facility: CLINIC | Age: 60
End: 2025-08-07
Payer: COMMERCIAL

## 2025-08-07 DIAGNOSIS — J34.89 NASAL VESTIBULITIS: ICD-10-CM

## 2025-08-07 DIAGNOSIS — J34.2 NASAL SEPTAL DEVIATION: ICD-10-CM

## 2025-08-07 DIAGNOSIS — R04.0 EPISTAXIS: Primary | ICD-10-CM

## 2025-08-07 PROCEDURE — 99213 OFFICE O/P EST LOW 20 MIN: CPT | Mod: S$PBB,S$GLB,, | Performed by: OTOLARYNGOLOGY

## 2025-08-07 PROCEDURE — 4010F ACE/ARB THERAPY RXD/TAKEN: CPT | Mod: CPTII,S$GLB,, | Performed by: OTOLARYNGOLOGY

## 2025-08-07 PROCEDURE — 1160F RVW MEDS BY RX/DR IN RCRD: CPT | Mod: CPTII,S$GLB,, | Performed by: OTOLARYNGOLOGY

## 2025-08-07 PROCEDURE — 99999 PR PBB SHADOW E&M-EST. PATIENT-LVL III: CPT | Mod: PBBFAC,,, | Performed by: OTOLARYNGOLOGY

## 2025-08-07 PROCEDURE — 1159F MED LIST DOCD IN RCRD: CPT | Mod: CPTII,S$GLB,, | Performed by: OTOLARYNGOLOGY

## 2025-08-07 PROCEDURE — 3044F HG A1C LEVEL LT 7.0%: CPT | Mod: CPTII,S$GLB,, | Performed by: OTOLARYNGOLOGY
